# Patient Record
Sex: FEMALE | Race: BLACK OR AFRICAN AMERICAN | Employment: OTHER | URBAN - METROPOLITAN AREA
[De-identification: names, ages, dates, MRNs, and addresses within clinical notes are randomized per-mention and may not be internally consistent; named-entity substitution may affect disease eponyms.]

---

## 2017-02-26 PROBLEM — J18.9 CAP (COMMUNITY ACQUIRED PNEUMONIA): Status: ACTIVE | Noted: 2017-02-26

## 2017-02-26 PROBLEM — E87.6 HYPOKALEMIA: Status: ACTIVE | Noted: 2017-02-26

## 2017-03-24 ENCOUNTER — HOSPITAL ENCOUNTER (OUTPATIENT)
Dept: OTHER | Age: 67
Discharge: HOME | End: 2017-03-24
Attending: FAMILY MEDICINE | Admitting: FAMILY MEDICINE

## 2017-03-24 DIAGNOSIS — J18.9 PNEUMONIA DUE TO INFECTIOUS ORGANISM, UNSPECIFIED LATERALITY, UNSPECIFIED PART OF LUNG: ICD-10-CM

## 2017-03-29 ENCOUNTER — OFFICE VISIT (OUTPATIENT)
Dept: PULMONOLOGY | Age: 67
End: 2017-03-29

## 2017-03-29 VITALS
WEIGHT: 266 LBS | DIASTOLIC BLOOD PRESSURE: 82 MMHG | RESPIRATION RATE: 18 BRPM | SYSTOLIC BLOOD PRESSURE: 135 MMHG | HEIGHT: 64 IN | HEART RATE: 69 BPM | OXYGEN SATURATION: 96 % | BODY MASS INDEX: 45.41 KG/M2 | TEMPERATURE: 97 F

## 2017-03-29 DIAGNOSIS — R91.8 PULMONARY NODULES: ICD-10-CM

## 2017-03-29 DIAGNOSIS — Z09 HOSPITAL DISCHARGE FOLLOW-UP: ICD-10-CM

## 2017-03-29 DIAGNOSIS — G47.33 OSA (OBSTRUCTIVE SLEEP APNEA): ICD-10-CM

## 2017-03-29 DIAGNOSIS — R59.0 MEDIASTINAL LYMPHADENOPATHY: ICD-10-CM

## 2017-03-29 DIAGNOSIS — J18.9 CAP (COMMUNITY ACQUIRED PNEUMONIA): Primary | ICD-10-CM

## 2017-03-29 PROCEDURE — 99214 OFFICE O/P EST MOD 30 MIN: CPT | Performed by: INTERNAL MEDICINE

## 2017-03-29 RX ORDER — OXYCODONE HYDROCHLORIDE AND ACETAMINOPHEN 5; 325 MG/1; MG/1
TABLET ORAL PRN
Status: ON HOLD | COMMUNITY
Start: 2017-02-20 | End: 2019-05-20 | Stop reason: HOSPADM

## 2017-03-29 RX ORDER — LEVOFLOXACIN 500 MG/1
TABLET, FILM COATED ORAL
COMMUNITY
Start: 2017-03-03 | End: 2017-05-05

## 2017-04-17 ENCOUNTER — HOSPITAL ENCOUNTER (OUTPATIENT)
Dept: CT IMAGING | Age: 67
Discharge: HOME | End: 2017-04-17
Attending: INTERNAL MEDICINE | Admitting: INTERNAL MEDICINE

## 2017-04-17 VITALS — OXYGEN SATURATION: 97 % | HEART RATE: 63 BPM

## 2017-04-17 DIAGNOSIS — R91.1 SOLITARY PULMONARY NODULE: ICD-10-CM

## 2017-04-17 DIAGNOSIS — R91.8 PULMONARY NODULES: ICD-10-CM

## 2017-04-17 DIAGNOSIS — R59.0 MEDIASTINAL LYMPHADENOPATHY: ICD-10-CM

## 2017-05-01 ENCOUNTER — HOSPITAL ENCOUNTER (OUTPATIENT)
Dept: PHYSICAL THERAPY | Age: 67
Discharge: HOME | End: 2017-05-31
Attending: FAMILY MEDICINE | Admitting: FAMILY MEDICINE

## 2017-05-01 ASSESSMENT — PAIN DESCRIPTION - FREQUENCY: FREQUENCY: CONTINUOUS

## 2017-05-01 ASSESSMENT — PAIN SCALES - GENERAL: PAINLEVEL_OUTOF10: 6

## 2017-05-01 ASSESSMENT — PAIN DESCRIPTION - ONSET: ONSET: AWAKENED FROM SLEEP

## 2017-05-01 ASSESSMENT — PAIN DESCRIPTION - ORIENTATION: ORIENTATION: LOWER

## 2017-05-01 ASSESSMENT — PAIN DESCRIPTION - PROGRESSION: CLINICAL_PROGRESSION: GRADUALLY WORSENING

## 2017-05-01 ASSESSMENT — PAIN DESCRIPTION - PAIN TYPE: TYPE: CHRONIC PAIN

## 2017-05-01 ASSESSMENT — PAIN DESCRIPTION - LOCATION: LOCATION: BACK

## 2017-05-05 ENCOUNTER — OFFICE VISIT (OUTPATIENT)
Dept: PULMONOLOGY | Age: 67
End: 2017-05-05

## 2017-05-05 VITALS
SYSTOLIC BLOOD PRESSURE: 152 MMHG | TEMPERATURE: 98.6 F | HEART RATE: 86 BPM | OXYGEN SATURATION: 97 % | RESPIRATION RATE: 18 BRPM | DIASTOLIC BLOOD PRESSURE: 89 MMHG | BODY MASS INDEX: 45.07 KG/M2 | WEIGHT: 264 LBS | HEIGHT: 64 IN

## 2017-05-05 DIAGNOSIS — R59.0 MEDIASTINAL LYMPHADENOPATHY: ICD-10-CM

## 2017-05-05 DIAGNOSIS — R91.8 PULMONARY NODULES: Primary | ICD-10-CM

## 2017-05-05 PROCEDURE — 99214 OFFICE O/P EST MOD 30 MIN: CPT | Performed by: INTERNAL MEDICINE

## 2017-05-10 ENCOUNTER — HOSPITAL ENCOUNTER (OUTPATIENT)
Dept: OTHER | Age: 67
Discharge: HOME | End: 2017-05-10
Attending: INTERNAL MEDICINE | Admitting: INTERNAL MEDICINE

## 2017-05-10 DIAGNOSIS — R91.8 LUNG NODULES: ICD-10-CM

## 2017-05-10 RX ORDER — FLUDEOXYGLUCOSE F 18 200 MCI/ML
14.35 INJECTION, SOLUTION INTRAVENOUS
Status: COMPLETED | OUTPATIENT
Start: 2017-05-10 | End: 2017-05-10

## 2017-05-10 RX ADMIN — FLUDEOXYGLUCOSE F 18 14.35 MILLICURIE: 200 INJECTION, SOLUTION INTRAVENOUS at 13:05

## 2017-05-15 ENCOUNTER — TELEPHONE (OUTPATIENT)
Dept: PULMONOLOGY | Age: 67
End: 2017-05-15

## 2017-05-15 DIAGNOSIS — R91.8 PULMONARY NODULES: Primary | ICD-10-CM

## 2017-05-31 ENCOUNTER — OFFICE VISIT (OUTPATIENT)
Dept: SURGERY | Age: 67
End: 2017-05-31

## 2017-05-31 VITALS
WEIGHT: 265 LBS | HEIGHT: 64 IN | BODY MASS INDEX: 45.24 KG/M2 | SYSTOLIC BLOOD PRESSURE: 122 MMHG | DIASTOLIC BLOOD PRESSURE: 82 MMHG

## 2017-05-31 DIAGNOSIS — R59.9 ENLARGED LYMPH NODES: Primary | ICD-10-CM

## 2017-05-31 PROCEDURE — 99203 OFFICE O/P NEW LOW 30 MIN: CPT | Performed by: SURGERY

## 2017-05-31 ASSESSMENT — ENCOUNTER SYMPTOMS
GASTROINTESTINAL NEGATIVE: 1
ALLERGIC/IMMUNOLOGIC NEGATIVE: 1
RESPIRATORY NEGATIVE: 1
BACK PAIN: 1
EYES NEGATIVE: 1

## 2017-06-05 ENCOUNTER — OFFICE VISIT (OUTPATIENT)
Dept: PULMONOLOGY | Age: 67
End: 2017-06-05

## 2017-06-05 ENCOUNTER — HOSPITAL ENCOUNTER (OUTPATIENT)
Dept: OTHER | Age: 67
Discharge: HOME | End: 2017-06-05
Attending: INTERNAL MEDICINE | Admitting: INTERNAL MEDICINE

## 2017-06-05 VITALS
BODY MASS INDEX: 45.07 KG/M2 | HEIGHT: 64 IN | WEIGHT: 264 LBS | DIASTOLIC BLOOD PRESSURE: 84 MMHG | SYSTOLIC BLOOD PRESSURE: 136 MMHG | RESPIRATION RATE: 18 BRPM | HEART RATE: 85 BPM | OXYGEN SATURATION: 96 %

## 2017-06-05 DIAGNOSIS — R91.8 PULMONARY NODULES: Primary | ICD-10-CM

## 2017-06-05 DIAGNOSIS — R59.1 LYMPHADENOPATHY: ICD-10-CM

## 2017-06-05 LAB — SEDIMENTATION RATE, ERYTHROCYTE: 48 MM/HR (ref 0–30)

## 2017-06-05 PROCEDURE — 99214 OFFICE O/P EST MOD 30 MIN: CPT | Performed by: INTERNAL MEDICINE

## 2017-06-06 LAB
ANA INTERPRETATION: ABNORMAL
ANA TITER: ABNORMAL
ANTI-NUCLEAR ANTIBODY (ANA): POSITIVE

## 2017-06-07 LAB
ANCA IFA: NORMAL
DOUBLE STRANDED DNA AB, IGG: NORMAL
ENA TO RNP ANTIBODY: NEGATIVE EU
ENA TO SMITH (SM) ANTIBODY: NEGATIVE EU
ENA TO SSA (RO) ANTIBODY: NEGATIVE EU
ENA TO SSB (LA) ANTIBODY: NEGATIVE EU
SCLERODERMA (SCL-70) AB: 1 AU/ML (ref 0–40)

## 2017-06-15 ENCOUNTER — TELEPHONE (OUTPATIENT)
Dept: PULMONOLOGY | Age: 67
End: 2017-06-15

## 2017-08-15 ENCOUNTER — HOSPITAL ENCOUNTER (OUTPATIENT)
Dept: ENDOSCOPY | Age: 67
Discharge: HOME | End: 2017-08-15
Attending: INTERNAL MEDICINE | Admitting: INTERNAL MEDICINE

## 2017-08-15 LAB
ANION GAP SERPL CALCULATED.3IONS-SCNC: 12 MMOL/L (ref 3–16)
BASOPHILS ABSOLUTE: 0.1 K/UL (ref 0–0.2)
BASOPHILS RELATIVE PERCENT: 0.9 %
BUN BLDV-MCNC: 15 MG/DL (ref 7–20)
CALCIUM SERPL-MCNC: 9.3 MG/DL (ref 8.3–10.6)
CHLORIDE BLD-SCNC: 100 MMOL/L (ref 99–110)
CO2: 26 MMOL/L (ref 21–32)
CREAT SERPL-MCNC: 0.7 MG/DL (ref 0.6–1.2)
EOSINOPHILS ABSOLUTE: 0.2 K/UL (ref 0–0.6)
EOSINOPHILS RELATIVE PERCENT: 2.3 %
GFR AFRICAN AMERICAN: >60
GFR NON-AFRICAN AMERICAN: >60
GLUCOSE BLD-MCNC: 110 MG/DL (ref 70–99)
HCT VFR BLD CALC: 35.3 % (ref 36–48)
HEMOGLOBIN: 11.2 G/DL (ref 12–16)
LYMPHOCYTES ABSOLUTE: 3.6 K/UL (ref 1–5.1)
LYMPHOCYTES RELATIVE PERCENT: 38.7 %
MAGNESIUM: 1.9 MG/DL (ref 1.8–2.4)
MCH RBC QN AUTO: 23.2 PG (ref 26–34)
MCHC RBC AUTO-ENTMCNC: 31.6 G/DL (ref 31–36)
MCV RBC AUTO: 73.2 FL (ref 80–100)
MONOCYTES ABSOLUTE: 0.5 K/UL (ref 0–1.3)
MONOCYTES RELATIVE PERCENT: 5.7 %
NEUTROPHILS ABSOLUTE: 4.9 K/UL (ref 1.7–7.7)
NEUTROPHILS RELATIVE PERCENT: 52.4 %
PDW BLD-RTO: 16.7 % (ref 12.4–15.4)
PLATELET # BLD: 232 K/UL (ref 135–450)
PMV BLD AUTO: 8.3 FL (ref 5–10.5)
POTASSIUM SERPL-SCNC: 3.3 MMOL/L (ref 3.5–5.1)
RBC # BLD: 4.82 M/UL (ref 4–5.2)
SODIUM BLD-SCNC: 138 MMOL/L (ref 136–145)
WBC # BLD: 9.4 K/UL (ref 4–11)

## 2017-10-06 ENCOUNTER — OFFICE VISIT (OUTPATIENT)
Dept: ORTHOPEDIC SURGERY | Age: 67
End: 2017-10-06

## 2017-10-06 VITALS
SYSTOLIC BLOOD PRESSURE: 132 MMHG | DIASTOLIC BLOOD PRESSURE: 87 MMHG | BODY MASS INDEX: 45.07 KG/M2 | HEART RATE: 67 BPM | WEIGHT: 264 LBS | HEIGHT: 64 IN

## 2017-10-06 DIAGNOSIS — M79.7 FIBROMYALGIA: Primary | ICD-10-CM

## 2017-10-06 PROCEDURE — 99203 OFFICE O/P NEW LOW 30 MIN: CPT | Performed by: NURSE PRACTITIONER

## 2017-10-06 NOTE — MR AVS SNAPSHOT
After Visit Summary             Cristal Oppenheim   10/6/2017 9:00 AM   Office Visit    Description:  Female : 1950   Provider:  Tod Madsen CNP   Department:  [facility]              Your Follow-Up and Future Appointments         Below is a list of your follow-up and future appointments. This may not be a complete list as you may have made appointments directly with providers that we are not aware of or your providers may have made some for you. Please call your providers to confirm appointments. It is important to keep your appointments. Please bring your current insurance card, photo ID, co-pay, and all medication bottles to your appointment. If self-pay, payment is expected at the time of service. Your To-Do List     Future Appointments Provider Department Dept Phone    2017 9:15 AM Sisi Lopez MD Pulmonary, Critical Care & Sleep [phone]    It is important to keep your appointment. Please bring a list of your medications to your appointment. Medications will not be filled prior to seeing the physician. Please bring your current insurance card, photo ID, and co-pay. If self-pay, payment is  expected at time of service. Information from Your Visit        Department     Name Address Phone Fax    [address] [facility] Sp[facility] [facility]) [address]. [phone]      You Were Seen for:         Comments    Fibromyalgia   [[id number]]         Vital Signs     Blood Pressure Pulse Height Weight Body Mass Index Smoking Status    132/87 67 5' 4\" (1.626 m) 264 lb (119.7 kg) 45.32 kg/m2 Never Smoker      Additional Information about your Body Mass Index (BMI)           Your BMI as listed above is considered obese (30 or more). BMI is an estimate of body fat, calculated from your height and weight.   The higher your BMI, the greater your risk of heart disease, high blood pressure, type 2 diabetes, stroke, gallstones, arthritis, sleep apnea, and certain cancers. BMI is not perfect. It may overestimate body fat in athletes and people who are more muscular. Even a small weight loss (between 5 and 10 percent of your current weight) by decreasing your calorie intake and becoming more physically active will help lower your risk of developing or worsening diseases associated with obesity. Learn more at: [employer].             Medications and Orders      Your Current Medications Are              traMADol (ULTRAM) 50 MG tablet Take 50 mg by mouth every 6 hours as needed for Pain    potassium chloride (KLOR-CON M) 20 MEQ extended release tablet Take 20 mEq by mouth daily    metoprolol (LOPRESSOR) 100 MG tablet Take 100 mg by mouth daily    gabapentin (NEURONTIN) 600 MG tablet Take 600 mg by mouth 3 times daily     oxyCODONE-acetaminophen (PERCOCET) 5-325 MG per tablet . levothyroxine (SYNTHROID) 150 MCG tablet Take 150 mcg by mouth Daily. valsartan-hydrochlorothiazide (DIOVAN-HCT) 320-25 MG per tablet Take 1 tablet by mouth daily. furosemide (LASIX) 80 MG tablet Take 80 mg by mouth daily. amLODIPine (NORVASC) 10 MG tablet Take 10 mg by mouth daily. Allergies              Lisinopril Other (See Comments)    Cough      We Ordered/Performed the following           Amb External Referral To Pain Clinic     Scheduling Instructions:    Please schedule for evaluation and treatment    Comments: The patient can be scheduled with any member of the group, including the provider with the first available appointments.          Additional Information        Basic Information     Date Of Birth Sex Race Ethnicity Preferred Language    1950 Female Black Non-/Non  English      Problem List as of 10/6/2017  Date Reviewed: 6/5/2017                Enlarged lymph nodes Pulmonary nodules    Mediastinal lymphadenopathy    MARK (obstructive sleep apnea)    CAP (community acquired pneumonia)    Hypokalemia      Preventive Care        Date Due    Hepatitis C screening is recommended for all adults regardless of risk factors born between [facility] at least once (lifetime) who have never been tested. 1950    Tetanus Combination Vaccine (1 - Tdap) 5/16/1970    Cholesterol Screening 5/16/1991    Diabetes Screening 5/16/1991    Colonoscopy 5/16/2001    Zoster Vaccine 5/16/2011    Osteoporosis screening or a bone density scan (Dexa) is recommended once at age 72. Based upon the results and risk factors for bone loss, your provider will recommend whether this needs to be repeated. 5/16/2016    Pneumococcal Vaccines (two) for all adults aged 72 and over (1 of 2 - PCV13) 5/16/2016    Yearly Flu Vaccine (1) 9/1/2017    Mammograms are recommended every 2 years for low/average risk patients aged 48 - 69, and every year for high risk patients per updated national guidelines. However these guidelines can be individualized by your provider. 9/28/2017            Crescentrating Signup           C[employer]centrating allows you to send messages to your doctor, view your test results, renew your prescriptions, schedule appointments, view visit notes, and more. How Do I Sign Up? 1. In your Internet browser, go to [email or web address]  2. Click on the Sign Up Now link in the Sign In box. You will see the New Member Sign Up page. 3. Enter your Crescentrating Access Code exactly as it appears below. You will not need to use this code after youve completed the sign-up process. If you do not sign up before the expiration date, you must request a new code. [employer]rating Access Code: [email or web address]  Expires: 12/5/2017  9:38 AM    4. Enter your Social Security Number ([id number]) and Date of Birth (mm/dd/yyyy) as indicated and click Submit. You will be taken to the next sign-up page. 5. Create a Ruckus ID. This will be your Ruckus login ID and cannot be changed, so think of one that is secure and easy to remember. 6. Create a Ruckus password. You can change your password at any time. 7. Enter your Password Reset Question and Answer. This can be used at a later time if you forget your password. 8. Enter your e-mail address. You will receive e-mail notification when new information is available in [address] 9. Click Sign Up. You can now view your medical record. Additional Information  If you have questions, please contact the physician practice where you receive care. Remember, Ruckus is NOT to be used for urgent needs. For medical emergencies, dial 911. For questions regarding your Ruckus account call [phone]. If you have a clinical question, please call your doctor's office.

## 2017-10-11 ENCOUNTER — HOSPITAL ENCOUNTER (OUTPATIENT)
Dept: ENDOSCOPY | Age: 67
Discharge: HOME | End: 2017-10-11
Attending: INTERNAL MEDICINE | Admitting: INTERNAL MEDICINE

## 2017-10-11 RX ORDER — SODIUM CHLORIDE 9 MG/ML
INJECTION, SOLUTION INTRAVENOUS CONTINUOUS
Status: DISCONTINUED | OUTPATIENT
Start: 2017-10-11 | End: 2017-10-12 | Stop reason: HOSPADM

## 2017-10-11 RX ORDER — SODIUM CHLORIDE 0.9 % (FLUSH) 0.9 %
10 SYRINGE (ML) INJECTION PRN
Status: DISCONTINUED | OUTPATIENT
Start: 2017-10-11 | End: 2017-10-12 | Stop reason: HOSPADM

## 2017-10-11 RX ORDER — SODIUM CHLORIDE 0.9 % (FLUSH) 0.9 %
10 SYRINGE (ML) INJECTION EVERY 12 HOURS SCHEDULED
Status: DISCONTINUED | OUTPATIENT
Start: 2017-10-11 | End: 2017-10-12 | Stop reason: HOSPADM

## 2017-10-11 NOTE — ANESTHESIA PRE-OP
Department of Anesthesiology  Preprocedure Note       Name:  Samuel Blue   Age:  78 y.o.  :  1950                                          MRN:  [id number]         Date:  10/11/2017      Surgeon:    Procedure:    Medications prior to admission:   Prior to Admission medications    Medication Sig Start Date End Date Taking? Authorizing Provider   traMADol (ULTRAM) 50 MG tablet Take 50 mg by mouth every 6 hours as needed for Pain    Historical Provider, MD   potassium chloride (KLOR-CON M) 20 MEQ extended release tablet Take 20 mEq by mouth daily    Historical Provider, MD   metoprolol (LOPRESSOR) 100 MG tablet Take 100 mg by mouth daily    Historical Provider, MD   gabapentin (NEURONTIN) 600 MG tablet Take 600 mg by mouth 3 times daily     Historical Provider, MD   oxyCODONE-acetaminophen (PERCOCET) 5-325 MG per tablet . 17   Historical Provider, MD   levothyroxine (SYNTHROID) 150 MCG tablet Take 150 mcg by mouth Daily. Historical Provider, MD   valsartan-hydrochlorothiazide (DIOVAN-HCT) 320-25 MG per tablet Take 1 tablet by mouth daily. Historical Provider, MD   furosemide (LASIX) 80 MG tablet Take 80 mg by mouth daily. Historical Provider, MD   amLODIPine (NORVASC) 10 MG tablet Take 10 mg by mouth daily. Historical Provider, MD       Current medications:    Current Outpatient Prescriptions   Medication Sig Dispense Refill    traMADol (ULTRAM) 50 MG tablet Take 50 mg by mouth every 6 hours as needed for Pain      potassium chloride (KLOR-CON M) 20 MEQ extended release tablet Take 20 mEq by mouth daily      metoprolol (LOPRESSOR) 100 MG tablet Take 100 mg by mouth daily      gabapentin (NEURONTIN) 600 MG tablet Take 600 mg by mouth 3 times daily       oxyCODONE-acetaminophen (PERCOCET) 5-325 MG per tablet .  levothyroxine (SYNTHROID) 150 MCG tablet Take 150 mcg by mouth Daily.  valsartan-hydrochlorothiazide (DIOVAN-HCT) 320-25 MG per tablet Take 1 tablet by mouth daily.  furosemide (LASIX) 80 MG tablet Take 80 mg by mouth daily.  amLODIPine (NORVASC) 10 MG tablet Take 10 mg by mouth daily. Current Facility-Administered Medications   Medication Dose Route Frequency Provider Last Rate Last Dose    0.9 % sodium chloride infusion   Intravenous Continuous Kimberley Membreno MD        sodium chloride flush 0.9 % injection 10 mL  10 mL Intravenous 2 times per day Kimberley Membreno MD        sodium chloride flush 0.9 % injection 10 mL  10 mL Intravenous PRN Kimberley Membreno MD           Allergies: Allergies   Allergen Reactions    Lisinopril Other (See Comments)     Cough         Problem List:    Patient Active Problem List   Diagnosis Code    CAP (community acquired pneumonia) J18.9    Hypokalemia E87.6    Pulmonary nodules R91.8    Mediastinal lymphadenopathy R59.0    MARK (obstructive sleep apnea) G47.33    Enlarged lymph nodes R59.9       Past Medical History:        Diagnosis Date    Back pain     Fibromyalgia     Hiatal hernia     Hypertension     Peripheral neuropathy ([facility] [address]     Thyroid disease        Past Surgical History:        Procedure Laterality Date    CARPAL TUNNEL RELEASE      ENDOSCOPY, COLON, DIAGNOSTIC      HYSTERECTOMY      THYROID SURGERY         Social History:    Social History   Substance Use Topics    Smoking status: Never Smoker    Smokeless tobacco: Never Used    Alcohol use No                                Counseling given: Not Answered      Vital Signs (Current): There were no vitals filed for this visit. BP Readings from Last 3 Encounters:   10/06/17 132/87   07/30/17 120/83   06/05/17 136/84       NPO Status:                                                                                 BMI:   Wt Readings from Last 3 Encounters:   10/06/17 264 lb (119.7 kg)   09/20/17 264 lb (119.7 kg)   08/02/17 263 lb (119.3 kg)     There is no height or weight on file to calculate BMI.     Anesthesia Evaluation  Patient summary reviewed and Nursing notes reviewed  Airway: Mallampati: II     Neck ROM: limited  Mouth opening: > = 3 FB Dental: normal exam         Pulmonary:       (-) pneumonia and sleep apnea                           Cardiovascular:  Exercise tolerance: good (>4 METS),   (+) hypertension:,                   Neuro/Psych:      (-) neuromuscular disease           GI/Hepatic/Renal:   (+) hiatal hernia,           Endo/Other:    (+) hypothyroidism::.                 Abdominal:           Vascular:                                    Anesthesia Plan      MAC     ASA 2             Anesthetic plan and risks discussed with patient. Plan discussed with CRNA.                   Markel Guillen MD   10/11/2017

## 2017-10-11 NOTE — ANESTHESIA POST-OP
Anesthesia Post-op Note    Patient: Samuel Blue  MRN: [id number]  YOB: 1950  Date of evaluation: 10/11/2017  Time:  12:35 PM     Procedure(s) Performed:     Last Vitals: There were no vitals taken for this visit.     Nikki Phase I:      Nikki Phase II:      Anesthesia Post Evaluation    Final anesthesia type: MAC and TIVA  Patient location during evaluation: PACU  Level of consciousness: awake  Complications: no  Hydration status: euvolemic        Almaz Alegre MD  12:35 PM

## 2018-08-13 ENCOUNTER — HOSPITAL ENCOUNTER (OUTPATIENT)
Dept: MAMMOGRAPHY | Age: 68
Discharge: HOME | End: 2018-08-13
Attending: FAMILY MEDICINE | Admitting: FAMILY MEDICINE

## 2018-08-13 DIAGNOSIS — Z12.31 VISIT FOR SCREENING MAMMOGRAM: ICD-10-CM

## 2018-12-20 ENCOUNTER — HOSPITAL ENCOUNTER (OUTPATIENT)
Dept: PHYSICAL THERAPY | Age: 68
Setting detail: THERAPIES SERIES
Discharge: HOME | End: 2018-12-20
Payer: MEDICARE

## 2018-12-20 PROCEDURE — G8979 MOBILITY GOAL STATUS: HCPCS

## 2018-12-20 PROCEDURE — 97110 THERAPEUTIC EXERCISES: CPT

## 2018-12-20 PROCEDURE — 97162 PT EVAL MOD COMPLEX 30 MIN: CPT

## 2018-12-20 PROCEDURE — 97530 THERAPEUTIC ACTIVITIES: CPT

## 2018-12-20 PROCEDURE — G8978 MOBILITY CURRENT STATUS: HCPCS

## 2018-12-20 ASSESSMENT — PAIN DESCRIPTION - ORIENTATION: ORIENTATION: RIGHT;LEFT;LOWER;MID

## 2018-12-20 ASSESSMENT — PAIN DESCRIPTION - DESCRIPTORS: DESCRIPTORS: BURNING;SHARP;SHOOTING;PINS AND NEEDLES

## 2018-12-20 ASSESSMENT — PAIN DESCRIPTION - PAIN TYPE: TYPE: CHRONIC PAIN

## 2018-12-20 ASSESSMENT — PAIN SCALES - GENERAL: PAINLEVEL_OUTOF10: 10

## 2018-12-20 ASSESSMENT — ACTIVITIES OF DAILY LIVING (ADL): EFFECT OF PAIN ON DAILY ACTIVITIES: ALL ACTIVITIES

## 2018-12-20 ASSESSMENT — PAIN DESCRIPTION - ONSET: ONSET: AWAKENED FROM SLEEP

## 2018-12-20 NOTE — FLOWSHEET NOTE
Physical Therapy Daily/Aquatic Flow Sheet   Date:  2018    Patient Name:  Reji Hernández    :  1950  MRN: [id number]  Restrictions/Precautions:    Medical/Treatment Diagnosis Information:   · Diagnosis: Back Pain M54.9   · Treatment Diagnosis: Decreased general mobility, chronic widespread pain, diff with gait, impaired ADL status     Tracking Information:  Physician Information Referring Practitioner: [address] [place] [address] Sent Date:  18  Signed Received:    Visit Count / Total Visits       Insurance Approved Visits  /  Approved Dates:     Insurance Information PT Insurance Information: [employer] 40 visits/year      Progress Note/G-codes   []  Yes  [x]  No Next Due: 10th visit (land)     Pain level: 1010 ALL OVER     Subjective:  SEE EVAL     Objective:   Observation: SEE EVAL   Test measurements:    Land-based Therapy Dates of Service:    Land-based Visits Exercises/Activities:  Exercise/Equipment Resistance/Repetitions Other comments                                                                              AquaticTherapy Dates of Service:   Aquatic Visits Exercises/Activities:   Transfers:          % Immersion:            Ambulation:   UE Exercises:       Forwards   X Shoulder Shrugs  X    Lateral   X Shoulder Circles  X    Retro   X Scapular Retraction   X    Marching   Push Downs       [place]   Punching     Jog    Rowing     Multifidi walkouts with paddle   Elbow Flex/Ext X      Shldr Flex/Ext X      Shldr aBd/aDd X   LE Exercises:  Shldr Horiz aBd/aDd X   HR/TR X Shldr IR/ER    Marches X Arm Circles X   Squats  X PNF Diagonals    Hamstring Curls X Wall Push Ups    Hip Flexion (SLR) X Figure 8's    Hip aBduction (SLR) X Bilateral Pull Downs    Hip Extension (SLR) X      Hip aDduction (SLR)      Hip Circles  Functional:    Hip IR/Er  Step up forward X   TrA Set   Step up lateral  X   Pelvic Tilts   Step down     Fig 8's   Lunges Forward    LE PNF  Lunges Retro Prognosis: [x] Good [x] Fair  [] Poor    Patient Requires Follow-up: [x] Yes  [] No    Plan:   [] Continue per plan of care [] Alter current plan (see comments)  [x] Plan of care initiated [] Hold pending MD visit [] Discharge  Plan for Next Session:   Aquatic therapy.       Electronically signed by:  Linda Candelaria PT

## 2018-12-26 ENCOUNTER — HOSPITAL ENCOUNTER (EMERGENCY)
Age: 68
Discharge: HOME | End: 2018-12-26
Attending: EMERGENCY MEDICINE
Payer: MEDICARE

## 2018-12-26 ENCOUNTER — APPOINTMENT (OUTPATIENT)
Dept: GENERAL RADIOLOGY | Age: 68
End: 2018-12-26
Payer: MEDICARE

## 2018-12-26 VITALS
DIASTOLIC BLOOD PRESSURE: 99 MMHG | RESPIRATION RATE: 16 BRPM | BODY MASS INDEX: 46.1 KG/M2 | OXYGEN SATURATION: 100 % | SYSTOLIC BLOOD PRESSURE: 174 MMHG | HEIGHT: 64 IN | HEART RATE: 56 BPM | WEIGHT: 270 LBS | TEMPERATURE: 98.1 F

## 2018-12-26 DIAGNOSIS — M25.512 ACUTE PAIN OF LEFT SHOULDER: Primary | ICD-10-CM

## 2018-12-26 DIAGNOSIS — M19.012 OSTEOARTHRITIS OF LEFT SHOULDER, UNSPECIFIED OSTEOARTHRITIS TYPE: ICD-10-CM

## 2018-12-26 PROCEDURE — 93005 ELECTROCARDIOGRAM TRACING: CPT | Performed by: EMERGENCY MEDICINE

## 2018-12-26 PROCEDURE — 73030 X-RAY EXAM OF SHOULDER: CPT

## 2018-12-26 PROCEDURE — 99283 EMERGENCY DEPT VISIT LOW MDM: CPT

## 2018-12-26 RX ORDER — KETOROLAC TROMETHAMINE 30 MG/ML
30 INJECTION, SOLUTION INTRAMUSCULAR; INTRAVENOUS ONCE
Status: DISCONTINUED | OUTPATIENT
Start: 2018-12-26 | End: 2018-12-26 | Stop reason: HOSPADM

## 2018-12-26 RX ORDER — MELOXICAM 15 MG/1
15 TABLET ORAL DAILY
Qty: 30 TABLET | Refills: 0 | Status: SHIPPED | OUTPATIENT
Start: 2018-12-26 | End: 2019-05-09

## 2018-12-26 ASSESSMENT — PAIN DESCRIPTION - ORIENTATION: ORIENTATION: LEFT

## 2018-12-26 ASSESSMENT — PAIN SCALES - GENERAL: PAINLEVEL_OUTOF10: 8

## 2018-12-26 ASSESSMENT — ENCOUNTER SYMPTOMS
COUGH: 0
VOMITING: 0
COLOR CHANGE: 0
BACK PAIN: 0
RESPIRATORY NEGATIVE: 1
NAUSEA: 0
DIARRHEA: 0
CONSTIPATION: 0
SHORTNESS OF BREATH: 0
ABDOMINAL PAIN: 0

## 2018-12-26 ASSESSMENT — PAIN DESCRIPTION - LOCATION: LOCATION: ARM

## 2018-12-26 NOTE — ED PROVIDER NOTES
the HPI. REVIEW OF SYSTEMS    (2-9 systems for level 4, 10 or more for level 5)     Review of Systems   Constitutional: Negative for activity change, appetite change, chills and fever. Respiratory: Negative. Negative for cough and shortness of breath. Cardiovascular: Negative. Negative for chest pain. Gastrointestinal: Negative for abdominal pain, constipation, diarrhea, nausea and vomiting. Genitourinary: Negative for difficulty urinating and dysuria. Musculoskeletal: Positive for arthralgias and myalgias. Negative for back pain, gait problem, joint swelling, neck pain and neck stiffness. Skin: Negative for color change, pallor, rash and wound. Neurological: Negative for dizziness, weakness, light-headedness, numbness and headaches. Positives and Pertinent negatives as per HPI. Except as noted abovein the ROS, all other systems were reviewed and negative. PAST MEDICAL HISTORY     Past Medical History:   Diagnosis Date    Back pain     Fibromyalgia     Hiatal hernia     Hypertension     Peripheral neuropathy     Thyroid disease          SURGICAL HISTORY     Past Surgical History:   Procedure Laterality Date    CARPAL TUNNEL RELEASE      ENDOSCOPY, COLON, DIAGNOSTIC      HYSTERECTOMY      THYROID SURGERY           CURRENTMEDICATIONS       Previous Medications    AMLODIPINE (NORVASC) 10 MG TABLET    Take 10 mg by mouth daily. FUROSEMIDE (LASIX) 80 MG TABLET    Take 80 mg by mouth daily. GABAPENTIN (NEURONTIN) 600 MG TABLET    Take 600 mg by mouth 3 times daily     LEVOTHYROXINE (SYNTHROID) 150 MCG TABLET    Take 150 mcg by mouth Daily. METOPROLOL (LOPRESSOR) 100 MG TABLET    Take 100 mg by mouth daily    OXYCODONE-ACETAMINOPHEN (PERCOCET) 5-325 MG PER TABLET    .     POTASSIUM CHLORIDE (KLOR-CON M) 20 MEQ EXTENDED RELEASE TABLET    Take 20 mEq by mouth daily    TRAMADOL (ULTRAM) 50 MG TABLET    Take 50 mg by mouth every 6 hours as needed for Pain         ALLERGIES Negative speeds. Negative [place]. Does have positive Neer. Negative empty can. Neurological: She is alert and oriented to person, place, and time. Skin: Skin is warm and dry. She is not diaphoretic. No erythema. No pallor. Psychiatric: She has a normal mood and affect. Her behavior is normal.       DIAGNOSTIC RESULTS   LABS:    Labs Reviewed - No data to display    All other labs were within normal range or not returned as of this dictation. EKG: All EKG's are interpreted by the Emergency Department Physician who either signs orCo-signs this chart in the absence of a cardiologist.  Please see their note for interpretation of EKG. RADIOLOGY:   Non-plain film images such as CT, Ultrasound and MRI are read by the radiologist. Plain radiographic images are visualized andpreliminarily interpreted by the  ED Provider with the below findings:        Interpretation [facility] Radiologist below, if available at the time of this note:    XR SHOULDER LEFT (MIN 2 VIEWS)   Preliminary Result   1. No acute abnormality of the left shoulder. 2. Mild osteoarthritis at the [facility] and glenohumeral joints. No results found. PROCEDURES   Unless otherwise noted below, none     Procedures    CRITICAL CARE TIME   N/A    CONSULTS:  None      EMERGENCY DEPARTMENT COURSE and DIFFERENTIALDIAGNOSIS/MDM:   Vitals:    Vitals:    12/26/18 0802   BP: (!) 174/99   Pulse: 56   Resp: 16   Temp: 98.1 °F (36.7 °C)   TempSrc: Infrared   SpO2: 100%   Weight: 270 lb (122.5 kg)   Height: 5' 4\" (1.626 m)       Patient was given thefollowing medications:  Medications   ketorolac (TORADOL) injection 30 mg (not administered)       Patient is a 42-year-old female who presents ED into the left shoulder pain. Upon examination patient afebrile with stable vital signs. Pain with palpation and movement of left shoulder. Pain specifically with internal or external rotation. Has a positive Neer's.   Given history and physical examination

## 2018-12-26 NOTE — ED NOTES
Bed: 09  Expected date:   Expected time:   Means of arrival:   Comments:  Oscar Hoff RN  12/26/18 5917

## 2018-12-27 LAB
EKG ATRIAL RATE: 83 BPM
EKG DIAGNOSIS: NORMAL
EKG P AXIS: 100 DEGREES
EKG P-R INTERVAL: 142 MS
EKG Q-T INTERVAL: 396 MS
EKG QRS DURATION: 82 MS
EKG QTC CALCULATION (BAZETT): 465 MS
EKG R AXIS: -13 DEGREES
EKG T AXIS: 28 DEGREES
EKG VENTRICULAR RATE: 83 BPM

## 2018-12-27 PROCEDURE — 93010 ELECTROCARDIOGRAM REPORT: CPT | Performed by: INTERNAL MEDICINE

## 2018-12-27 NOTE — PROGRESS NOTES
Physical Therapy  Cancellation/No-show Note  Patient Name:  Josselin Varma  :  1950   Date:  2018  Cancelled visits to date: 0  No-shows to date: 0    Patient status for today's appointment patient:  []  Cancelled  []  Rescheduled appointment  [x]  No-show  (pool)   Reason given by patient:  []  Patient ill  []  Conflicting appointment  []  No transportation    []  Conflict with work  []  No reason given  []  Other:     Comments:   Noted that pt was seen in ED yesterday for L shaun pain    Phone call information:   []  Phone call made today to patient at _ time at number provided:      []  Patient answered, conversation as follows:    []  Patient did not answer, message left as follows:  [x]  Phone call not made today    Electronically signed by:  Malgorzata Henry PT

## 2018-12-31 ENCOUNTER — HOSPITAL ENCOUNTER (OUTPATIENT)
Dept: PHYSICAL THERAPY | Age: 68
Setting detail: THERAPIES SERIES
Discharge: HOME | End: 2018-12-31
Payer: MEDICARE

## 2018-12-31 PROCEDURE — 97113 AQUATIC THERAPY/EXERCISES: CPT

## 2018-12-31 PROCEDURE — 97150 GROUP THERAPEUTIC PROCEDURES: CPT

## 2019-01-04 ENCOUNTER — HOSPITAL ENCOUNTER (OUTPATIENT)
Dept: PHYSICAL THERAPY | Age: 69
Setting detail: THERAPIES SERIES
Discharge: HOME | End: 2019-01-04
Payer: MEDICARE

## 2019-01-04 PROCEDURE — 97113 AQUATIC THERAPY/EXERCISES: CPT

## 2019-01-08 ENCOUNTER — HOSPITAL ENCOUNTER (OUTPATIENT)
Dept: PHYSICAL THERAPY | Age: 69
Setting detail: THERAPIES SERIES
Discharge: HOME | End: 2019-01-08
Payer: MEDICARE

## 2019-01-08 PROCEDURE — 97150 GROUP THERAPEUTIC PROCEDURES: CPT

## 2019-01-08 PROCEDURE — 97113 AQUATIC THERAPY/EXERCISES: CPT

## 2019-01-10 ENCOUNTER — HOSPITAL ENCOUNTER (OUTPATIENT)
Dept: PHYSICAL THERAPY | Age: 69
Setting detail: THERAPIES SERIES
Discharge: HOME | End: 2019-01-10
Payer: MEDICARE

## 2019-01-10 PROCEDURE — 97150 GROUP THERAPEUTIC PROCEDURES: CPT

## 2019-01-10 PROCEDURE — 97113 AQUATIC THERAPY/EXERCISES: CPT

## 2019-01-15 ENCOUNTER — HOSPITAL ENCOUNTER (OUTPATIENT)
Dept: PHYSICAL THERAPY | Age: 69
Setting detail: THERAPIES SERIES
Discharge: HOME | End: 2019-01-15
Payer: MEDICARE

## 2019-01-15 PROCEDURE — 97150 GROUP THERAPEUTIC PROCEDURES: CPT

## 2019-01-15 PROCEDURE — 97113 AQUATIC THERAPY/EXERCISES: CPT

## 2019-01-17 ENCOUNTER — HOSPITAL ENCOUNTER (OUTPATIENT)
Dept: PHYSICAL THERAPY | Age: 69
Setting detail: THERAPIES SERIES
Discharge: HOME | End: 2019-01-17
Payer: MEDICARE

## 2019-01-17 PROCEDURE — 97150 GROUP THERAPEUTIC PROCEDURES: CPT

## 2019-01-17 PROCEDURE — 97113 AQUATIC THERAPY/EXERCISES: CPT

## 2019-01-18 ENCOUNTER — APPOINTMENT (OUTPATIENT)
Dept: PHYSICAL THERAPY | Age: 69
End: 2019-01-18
Payer: MEDICARE

## 2019-01-22 ENCOUNTER — HOSPITAL ENCOUNTER (OUTPATIENT)
Dept: PHYSICAL THERAPY | Age: 69
Setting detail: THERAPIES SERIES
Discharge: HOME | End: 2019-01-22
Payer: MEDICARE

## 2019-01-22 PROCEDURE — 97150 GROUP THERAPEUTIC PROCEDURES: CPT

## 2019-01-22 PROCEDURE — 97113 AQUATIC THERAPY/EXERCISES: CPT

## 2019-01-24 ENCOUNTER — HOSPITAL ENCOUNTER (OUTPATIENT)
Dept: PHYSICAL THERAPY | Age: 69
Setting detail: THERAPIES SERIES
Discharge: HOME | End: 2019-01-24
Payer: MEDICARE

## 2019-01-24 PROCEDURE — 97113 AQUATIC THERAPY/EXERCISES: CPT

## 2019-01-24 PROCEDURE — 97150 GROUP THERAPEUTIC PROCEDURES: CPT

## 2019-01-25 ENCOUNTER — APPOINTMENT (OUTPATIENT)
Dept: PHYSICAL THERAPY | Age: 69
End: 2019-01-25
Payer: MEDICARE

## 2019-01-28 ENCOUNTER — HOSPITAL ENCOUNTER (OUTPATIENT)
Dept: PHYSICAL THERAPY | Age: 69
Setting detail: THERAPIES SERIES
Discharge: HOME | End: 2019-01-28
Payer: MEDICARE

## 2019-01-28 PROCEDURE — 97110 THERAPEUTIC EXERCISES: CPT

## 2019-01-28 PROCEDURE — 97530 THERAPEUTIC ACTIVITIES: CPT

## 2019-02-01 ENCOUNTER — HOSPITAL ENCOUNTER (OUTPATIENT)
Dept: PHYSICAL THERAPY | Age: 69
Setting detail: THERAPIES SERIES
Discharge: HOME | End: 2019-02-01
Payer: MEDICARE

## 2019-02-01 PROCEDURE — 97150 GROUP THERAPEUTIC PROCEDURES: CPT

## 2019-02-01 PROCEDURE — 97113 AQUATIC THERAPY/EXERCISES: CPT

## 2019-02-07 ENCOUNTER — HOSPITAL ENCOUNTER (OUTPATIENT)
Dept: PHYSICAL THERAPY | Age: 69
Setting detail: THERAPIES SERIES
Discharge: HOME | End: 2019-02-07
Payer: MEDICARE

## 2019-02-07 PROCEDURE — 97113 AQUATIC THERAPY/EXERCISES: CPT

## 2019-02-07 PROCEDURE — 97150 GROUP THERAPEUTIC PROCEDURES: CPT

## 2019-02-12 ENCOUNTER — HOSPITAL ENCOUNTER (OUTPATIENT)
Dept: PHYSICAL THERAPY | Age: 69
Setting detail: THERAPIES SERIES
Discharge: HOME | End: 2019-02-12
Payer: MEDICARE

## 2019-02-12 PROCEDURE — 97150 GROUP THERAPEUTIC PROCEDURES: CPT

## 2019-02-12 PROCEDURE — 97113 AQUATIC THERAPY/EXERCISES: CPT

## 2019-02-15 ENCOUNTER — HOSPITAL ENCOUNTER (OUTPATIENT)
Dept: PHYSICAL THERAPY | Age: 69
Setting detail: THERAPIES SERIES
Discharge: HOME | End: 2019-02-15
Payer: MEDICARE

## 2019-02-15 PROCEDURE — 97150 GROUP THERAPEUTIC PROCEDURES: CPT

## 2019-02-15 PROCEDURE — 97113 AQUATIC THERAPY/EXERCISES: CPT

## 2019-02-18 ENCOUNTER — HOSPITAL ENCOUNTER (OUTPATIENT)
Dept: PHYSICAL THERAPY | Age: 69
Setting detail: THERAPIES SERIES
Discharge: HOME | End: 2019-02-18
Payer: MEDICARE

## 2019-02-18 PROCEDURE — 97150 GROUP THERAPEUTIC PROCEDURES: CPT

## 2019-02-18 PROCEDURE — 97113 AQUATIC THERAPY/EXERCISES: CPT

## 2019-02-22 ENCOUNTER — HOSPITAL ENCOUNTER (OUTPATIENT)
Dept: PHYSICAL THERAPY | Age: 69
Setting detail: THERAPIES SERIES
Discharge: HOME | End: 2019-02-22
Payer: MEDICARE

## 2019-02-22 PROCEDURE — 97113 AQUATIC THERAPY/EXERCISES: CPT

## 2019-02-22 PROCEDURE — 97150 GROUP THERAPEUTIC PROCEDURES: CPT

## 2019-02-25 ENCOUNTER — HOSPITAL ENCOUNTER (OUTPATIENT)
Dept: PHYSICAL THERAPY | Age: 69
Setting detail: THERAPIES SERIES
Discharge: HOME | End: 2019-02-25
Payer: MEDICARE

## 2019-02-25 PROCEDURE — 97150 GROUP THERAPEUTIC PROCEDURES: CPT

## 2019-02-25 PROCEDURE — 97113 AQUATIC THERAPY/EXERCISES: CPT

## 2019-03-01 ENCOUNTER — HOSPITAL ENCOUNTER (OUTPATIENT)
Dept: PHYSICAL THERAPY | Age: 69
Setting detail: THERAPIES SERIES
Discharge: HOME | End: 2019-03-01
Payer: MEDICARE

## 2019-03-04 ENCOUNTER — HOSPITAL ENCOUNTER (OUTPATIENT)
Dept: PHYSICAL THERAPY | Age: 69
Setting detail: THERAPIES SERIES
Discharge: HOME | End: 2019-03-04
Payer: MEDICARE

## 2019-03-04 ENCOUNTER — HOSPITAL ENCOUNTER (OUTPATIENT)
Age: 69
Discharge: HOME | End: 2019-03-04
Payer: MEDICARE

## 2019-03-04 LAB
BASOPHILS ABSOLUTE: 0.1 K/UL (ref 0–0.2)
BASOPHILS RELATIVE PERCENT: 0.4 %
EOSINOPHILS ABSOLUTE: 0.1 K/UL (ref 0–0.6)
EOSINOPHILS RELATIVE PERCENT: 0.5 %
HCT VFR BLD CALC: 34.2 % (ref 36–48)
HEMOGLOBIN: 10.9 G/DL (ref 12–16)
LYMPHOCYTES ABSOLUTE: 3.4 K/UL (ref 1–5.1)
LYMPHOCYTES RELATIVE PERCENT: 24.2 %
MCH RBC QN AUTO: 22.9 PG (ref 26–34)
MCHC RBC AUTO-ENTMCNC: 31.8 G/DL (ref 31–36)
MCV RBC AUTO: 71.9 FL (ref 80–100)
MONOCYTES ABSOLUTE: 1 K/UL (ref 0–1.3)
MONOCYTES RELATIVE PERCENT: 7.3 %
NEUTROPHILS ABSOLUTE: 9.5 K/UL (ref 1.7–7.7)
NEUTROPHILS RELATIVE PERCENT: 67.6 %
PDW BLD-RTO: 16.1 % (ref 12.4–15.4)
PLATELET # BLD: 315 K/UL (ref 135–450)
PMV BLD AUTO: 8.3 FL (ref 5–10.5)
RBC # BLD: 4.75 M/UL (ref 4–5.2)
SEDIMENTATION RATE, ERYTHROCYTE: 64 MM/HR (ref 0–30)
URIC ACID, SERUM: 6.3 MG/DL (ref 2.6–6)
WBC # BLD: 14 K/UL (ref 4–11)

## 2019-03-04 PROCEDURE — 84550 ASSAY OF BLOOD/URIC ACID: CPT

## 2019-03-04 PROCEDURE — 85025 COMPLETE CBC W/AUTO DIFF WBC: CPT

## 2019-03-04 PROCEDURE — 36415 COLL VENOUS BLD VENIPUNCTURE: CPT

## 2019-03-04 PROCEDURE — 85652 RBC SED RATE AUTOMATED: CPT

## 2019-04-25 ENCOUNTER — APPOINTMENT (OUTPATIENT)
Dept: GENERAL RADIOLOGY | Age: 69
End: 2019-04-25
Payer: MEDICARE

## 2019-04-25 ENCOUNTER — HOSPITAL ENCOUNTER (EMERGENCY)
Age: 69
Discharge: HOME | End: 2019-04-25
Payer: MEDICARE

## 2019-04-25 VITALS
DIASTOLIC BLOOD PRESSURE: 81 MMHG | RESPIRATION RATE: 18 BRPM | SYSTOLIC BLOOD PRESSURE: 156 MMHG | HEIGHT: 64 IN | HEART RATE: 89 BPM | OXYGEN SATURATION: 96 % | WEIGHT: 272 LBS | TEMPERATURE: 98.6 F | BODY MASS INDEX: 46.44 KG/M2

## 2019-04-25 DIAGNOSIS — S93.402A SPRAIN OF LEFT ANKLE, UNSPECIFIED LIGAMENT, INITIAL ENCOUNTER: Primary | ICD-10-CM

## 2019-04-25 PROCEDURE — 73630 X-RAY EXAM OF FOOT: CPT

## 2019-04-25 PROCEDURE — 73600 X-RAY EXAM OF ANKLE: CPT

## 2019-04-25 PROCEDURE — 99283 EMERGENCY DEPT VISIT LOW MDM: CPT

## 2019-04-25 ASSESSMENT — PAIN DESCRIPTION - ORIENTATION: ORIENTATION: LEFT

## 2019-04-25 ASSESSMENT — ENCOUNTER SYMPTOMS
COLOR CHANGE: 0
NAUSEA: 0
BACK PAIN: 0
VOMITING: 0
DIARRHEA: 0
WHEEZING: 0
ABDOMINAL PAIN: 0
SHORTNESS OF BREATH: 0

## 2019-04-25 ASSESSMENT — PAIN DESCRIPTION - PAIN TYPE: TYPE: ACUTE PAIN

## 2019-04-25 ASSESSMENT — PAIN SCALES - GENERAL: PAINLEVEL_OUTOF10: 7

## 2019-04-25 ASSESSMENT — PAIN DESCRIPTION - LOCATION: LOCATION: ANKLE

## 2019-04-25 NOTE — ED PROVIDER NOTES
[address] [facility]  eMERGENCY dEPARTMENT eNCOUnter        Pt Name: Jaci Cruz  MRN: [id number]  [place] 1950  Date of evaluation: 4/25/2019  Provider: Carli Sloan PA-C  PCP: Augustina Castillo MD    This patient was not seen and evaluated by the attending physician No att. providers found. CHIEF COMPLAINT       Chief Complaint   Patient presents with    Ankle Pain     Pt. comes in today with left ankle pain. Pt. states she got out of her truck on Tuesday and slide onto some uneven payment and hurt her left ankle. HISTORY OF PRESENT ILLNESS   (Location/Symptom, Timing/Onset, Context/Setting, Quality, Duration, Modifying Factors, Severity)  Note limiting factors. Jaci Cruz is a 80 y.o. female patient presents emergency department for evaluation of left ankle pain. On Tuesday patient was getting out of her truck when she slid on some uneven pavement and rolled her left ankle. Patient was able to ambulate slightly yesterday but is unable to ambulate due to pain today. SHe states her left ankle feels swollen and warm. Patient states she just had gout in her right foot. Patient has some chronic knee issues as well. Patient currently rates her ankle pain as a 7/10. She denies any numbness or tingling in her leg. Patient denies any back pain, loss of bowel or bladder or saddle anesthesia. She did not fall onto the ground or hit her head. Nursing Notes were all reviewed and agreed with or any disagreements were addressed  in the HPI. REVIEW OF SYSTEMS    (2-9 systems for level 4, 10 or more for level 5)     Review of Systems   Constitutional: Negative for fatigue and fever. HENT: Negative. Eyes: Negative for visual disturbance. Respiratory: Negative for shortness of breath and wheezing. Cardiovascular: Negative for chest pain and palpitations. Gastrointestinal: Negative for abdominal pain, diarrhea, nausea and vomiting. Genitourinary: Negative for dysuria. Musculoskeletal: Positive for arthralgias (left ankle). Negative for back pain, joint swelling, myalgias, neck pain and neck stiffness. Skin: Negative for color change, pallor, rash and wound. Neurological: Negative for dizziness, syncope, light-headedness and headaches. Positives and Pertinent negatives as per HPI. Except as noted abovein the ROS, all other systems were reviewed and negative. PAST MEDICAL HISTORY     Past Medical History:   Diagnosis Date    Arthritis     Back pain     Fibromyalgia     Hiatal hernia     Hypertension     Peripheral neuropathy     Spondylolisthesis, lumbar region     Thyroid disease          SURGICAL HISTORY     Past Surgical History:   Procedure Laterality Date    CARPAL TUNNEL RELEASE      ENDOSCOPY, COLON, DIAGNOSTIC      HYSTERECTOMY      THYROID SURGERY           CURRENTMEDICATIONS       Previous Medications    AMLODIPINE (NORVASC) 10 MG TABLET    Take 10 mg by mouth daily. FUROSEMIDE (LASIX) 80 MG TABLET    Take 80 mg by mouth daily. GABAPENTIN (NEURONTIN) 600 MG TABLET    Take 600 mg by mouth 3 times daily as needed. LEVOTHYROXINE (SYNTHROID) 150 MCG TABLET    Take 150 mcg by mouth Daily. MELOXICAM (MOBIC) 15 MG TABLET    Take 1 tablet by mouth daily    METOPROLOL (LOPRESSOR) 100 MG TABLET    Take 100 mg by mouth daily    OXYCODONE-ACETAMINOPHEN (PERCOCET) 5-325 MG PER TABLET    as needed.      POTASSIUM CHLORIDE (KLOR-CON M) 20 MEQ EXTENDED RELEASE TABLET    Take 20 mEq by mouth daily    TRAMADOL (ULTRAM) 50 MG TABLET    Take 50 mg by mouth every 6 hours as needed for Pain         ALLERGIES     Lisinopril    FAMILYHISTORY       Family History   Problem Relation Age of Onset    Diabetes Mother           SOCIAL HISTORY       Social History     Socioeconomic History    Marital status:      Spouse name: None    Number of children: None    Years of education: None    Highest education level: None   Occupational History    None   Social Needs    Financial resource strain: None    Food insecurity:     Worry: None     Inability: None    Transportation needs:     Medical: None     Non-medical: None   Tobacco Use    Smoking status: Never Smoker    Smokeless tobacco: Never Used   Substance and Sexual Activity    Alcohol use: No    Drug use: No    Sexual activity: None   Lifestyle    Physical activity:     Days per week: None     Minutes per session: None    Stress: None   Relationships    Social connections:     Talks on phone: None     Gets together: None     Attends Latter-day service: None     Active member of club or organization: None     Attends meetings of clubs or organizations: None     Relationship status: None    Intimate partner violence:     Fear of current or ex partner: None     Emotionally abused: None     Physically abused: None     Forced sexual activity: None   Other Topics Concern    None   Social History Narrative    None       SCREENINGS             PHYSICAL EXAM    (up to 7 for level 4, 8 or more for level 5)     ED Triage Vitals [04/25/19 1807]   BP Temp Temp Source Pulse Resp SpO2 Height Weight   (!) 162/93 98.6 °F (37 °C) Infrared 89 18 96 % 5' 4\" (1.626 m) 272 lb (123.4 kg)       Physical Exam   Constitutional: She is oriented to person, place, and time. She appears well-developed and well-nourished. No distress. HENT:   Head: Normocephalic and atraumatic. Nose: Nose normal.   Mouth/Throat: Oropharynx is clear and moist.   Eyes: Conjunctivae and EOM are normal. Right eye exhibits no discharge. Left eye exhibits no discharge. Neck: Normal range of motion. Neck supple. Cardiovascular: Intact distal pulses. Pulmonary/Chest: Effort normal. No respiratory distress. Musculoskeletal:        Right ankle: Normal.        Left ankle: She exhibits decreased range of motion and swelling. She exhibits no deformity, no laceration and normal pulse. No tenderness. Patient has some nonpitting edema and warmth to the bilateral ankle and foot of the left side. Neurological: She is alert and oriented to person, place, and time. Skin: Skin is warm and dry. Capillary refill takes less than 2 seconds. No rash noted. She is not diaphoretic. There is erythema. No pallor. Psychiatric: She has a normal mood and affect. Her behavior is normal.   Nursing note and vitals reviewed. DIAGNOSTIC RESULTS   LABS:    Labs Reviewed - No data to display    All other labs were within normal range or not returned as of this dictation. EKG: All EKG's are interpreted by the Emergency Department Physician who either signs orCo-signs this chart in the absence of a cardiologist.  Please see their note for interpretation of EKG. RADIOLOGY:   Non-plain film images such as CT, Ultrasound and MRI are read by the radiologist. Plain radiographic images are visualized andpreliminarily interpreted by the  ED Provider with the below findings:        Interpretation perthe Radiologist below, if available at the time of this note:    XR FOOT LEFT (MIN 3 VIEWS)   Final Result   Circumferential soft tissue swelling of the forefoot, greater dorsally. No   soft tissue gas or radiopaque foreign body. No radiographic evidence of osteomyelitis is detected at this time. XR ANKLE LEFT (2 VIEWS)   Final Result   No evidence of acute fracture. No results found.       PROCEDURES   Unless otherwise noted below, none     Procedures    CRITICAL CARE TIME   N/A    CONSULTS:  None      EMERGENCY DEPARTMENT COURSE and DIFFERENTIALDIAGNOSIS/MDM:   Vitals:    Vitals:    04/25/19 1807   BP: (!) 162/93   Pulse: 89   Resp: 18   Temp: 98.6 °F (37 °C)   TempSrc: Infrared   SpO2: 96%   Weight: 272 lb (123.4 kg)   Height: 5' 4\" (1.626 m)       Patient was given thefollowing medications:  Medications - No data to display    Patient presents emergency department for evaluation of left ankle sprain after rolling it while getting out of the car. On evaluation patient is alert and oriented in no acute distress. Patient has circumferential swelling to the left foot and ankle. Both lateral and medial malleolus are tender to palpation. Patient has 2+ dorsalis pedis pulse and capillary refill less than 2 seconds. X-ray shows no acute bony fracture. Patient will be given Ace wrap for comfort and crutches. Patient can follow up with primary care or orthopedic as they feel necessary. Patient likely has an ankle sprain due to the eversion injury. At this time I feel the patient is at low risk for neurovascular injury, acute bony fracture, ligamentous tear, other acute injury that would require further management. She'll take Tylenol at home for pain management as she cannot take anti-inflammatory medications. Patient RT has an appointment with her orthopedic surgeon on Monday and will follow up regarding this ankle then. FINAL IMPRESSION      1.  Sprain of left ankle, unspecified ligament, initial encounter          DISPOSITION/PLAN   DISPOSITION Decision To Discharge 04/25/2019 06:57:48 PM      PATIENT REFERREDTO:  Robert Umana MD  [facility] [address]  [address] [facility] [address] [place] [phone]    Schedule an appointment as soon as possible for a visit in 3 days  for re-evaluation    [facility]  [address] [place] [address]  [phone]    If symptoms worsen      DISCHARGE MEDICATIONS:  New Prescriptions    No medications on file       DISCONTINUED MEDICATIONS:  Discontinued Medications    No medications on file              (Please note that portions ofthis note were completed with a voice recognition program.  Efforts were made to edit the dictations but occasionally words are mis-transcribed.)    Nory Carlos PA-C (electronically signed)            Nory Carlos CARLOTA  04/25/19 1904

## 2019-04-25 NOTE — ED NOTES
Discharge instructions discussed with no questions or concerns. Pt. Lillian understanding to follow up with PCP and orthopedics. Pt. Wheel out via wheel chair and assisted into car with no signs of distress noted.        Adina Barrientos RN  04/25/19 Merari Erazo

## 2019-05-07 NOTE — PROGRESS NOTES
[facility] / [facility] ([facility]) [address] [facility], [address]    Acknowledgment of Informed Consent for Surgical or Medical Procedure and Sedation  I agree to allow doctor(s) CAROL KEENE and his/her associates or assistants, including residents and/or other qualified medical practitioner to perform the following medical treatment or procedure and to administer or direct the administration of sedation as necessary:  Procedure(s): L2-3, L3-4 LATERAL LUMBAR INTERBODY FUSION, L4-5 TRANSFORAMINAL LUMBAR INTERBODY FUSION WITH PEDICLE SCREW  My doctor has explained the following regarding the proposed procedure:   the explanation of the procedure   the benefits of the procedure   the potential problems that might occur during recuperation   the risks and side effects of the procedure which could include but are not limited to severe blood loss, infection, stroke or death   the benefits, risks and side effect of alternative procedures including the consequences of declining this procedure or any alternative procedures   the likelihood of achieving satisfactory results. I acknowledge no guarantee or assurance has been made to me regarding the results. I understand that during the course of this treatment/procedure, unforeseen conditions can occur which require an additional or different procedure. I agree to allow my physician or assistants to perform such extension of the original procedure as they may find necessary. I understand that sedation will often result in temporary impairment of memory and fine motor skills and that sedation can occasionally progress to a state of deep sedation or general anesthesia. I understand the risks of anesthesia for surgery include, but are not limited to, sore throat, hoarseness, injury to face, mouth, or teeth; nausea; headache; injury to blood vessels or nerves; death, brain damage, or paralysis.     I understand that if I have a Limitation of Treatment order in effect during my hospitalization, the order may or may not be in effect during this procedure. I give my doctor permission to give me blood or blood products. I understand that there are risks with receiving blood such as hepatitis, AIDS, fever, or allergic reaction. I acknowledge that the risks, benefits, and alternatives of this treatment have been explained to me and that no express or implied warranty has been given by the hospital, any blood bank, or any person or entity as to the blood or blood components transfused. At the discretion of my doctor, I agree to allow observers, equipment/product representatives and allow photographing, and/or televising of the procedure, provided my name or identity is maintained confidentially. I agree the hospital may dispose of or use for scientific or educational purposes any tissue, fluid, or body parts which may be removed.     ________________________________Date________Time______ am/pm  ([place] [facility])  Patient or Signature of Closest Relative or Legal Guardian    ________________________________Date________Time______am/pm      Page 1 of  1  Witness

## 2019-05-09 RX ORDER — COLCHICINE 0.6 MG/1
TABLET, FILM COATED ORAL
COMMUNITY
Start: 2019-04-17 | End: 2019-11-29

## 2019-05-09 RX ORDER — ALLOPURINOL 100 MG/1
TABLET ORAL
Refills: 3 | Status: ON HOLD | COMMUNITY
Start: 2019-04-17 | End: 2019-05-20 | Stop reason: HOSPADM

## 2019-05-09 RX ORDER — ATORVASTATIN CALCIUM 10 MG/1
10 TABLET, FILM COATED ORAL DAILY
COMMUNITY

## 2019-05-09 NOTE — PROGRESS NOTES
#2 if you have not been preregistered yet. On the day of your procedure bring your insurance card and photo ID. You will be registered at your bedside once brought back to your room. 5. DO NOT EAT ANYTHING eight hours prior to surgery. May have 8 ounces of water 4 hours prior to surgery. 6. MEDICATIONS    Take the following medications with a SMALL sip of water:  Metoprolol and norvasc   Use your usual dose of inhalers the morning of surgery. BRING your rescue inhaler with you to hospital.    Anesthesia does NOT want you to take insulin the morning of surgery. They will control your blood sugar while you are at the hospital. Please contact your ordering physician for instructions regarding your insulin the night before your procedure. If you have an insulin pump, please keep it set on basal rate. 7. Do not swallow water when brushing teeth. No gum, candy, mints or ice chips. Refrain from smoking or at least decrease the amount. 8. Dress in loose, comfortable clothing appropriate for redressing after your procedure. Do not wear jewelry (including body piercings), make-up (especially NO eye make-up), fingernail polish (NO toenail polish if foot/leg surgery), lotion, powders or metal hairclips. 9. Dentures, glasses, or contacts will need to be removed before your procedure. Bring cases for your glasses, contacts, dentures, or hearing aids to protect them while you are in surgery. 10. If you use a CPAP, please bring it with you on the day of your procedure. 11. We recommend that valuable personal  belongings such as cash, cell phones, e-tablets or jewelry, be left at home during your stay. The hospital will not be responsible for valuables that are not secured in the hospital safe. However, if your insurance requires a co-pay, you may want to bring a method of payment, i.e. Check or credit card, if you wish to pay your co-pay the day of surgery.       12. If you are to stay overnight, you may bring a bag with personal items. Please have any large items you may need brought in by your family after your arrival to your hospital room. 15. If you have a Living Will or Durable Power of , please bring a copy on the day of your procedure. 15. With your permission, one family member may accompany you while you are being prepared for surgery. Once you are ready, additional family members may join you. HOW WE KEEP YOU SAFE and WORK TO PREVENT SURGICAL SITE INFECTIONS:  1. Health care workers should always check your ID bracelet to verify your name and birth date. You will be asked many times to state your name, date of birth, and allergies. 2. Health care workers should always clean their hands with soap or alcohol gel before providing care to you. It is okay to ask anyone if they cleaned their hands before they touch you. 3. You will be actively involved in verifying the type of procedure you are having and ensuring the correct surgical site. This will be confirmed multiple times prior to your procedure. Do NOT kavita your surgery site UNLESS instructed to by your surgeon. 4. Do not shave or wax for 72 hours prior to procedure near your operative site. Shaving with a razor can irritate your skin and make it easier to develop an infection. On the day of your procedure, any hair that needs to be removed near the surgical site will be clipped by a healthcare worker using a special clippers designed to avoid skin irritation. 5. When you are in the operating room, your surgical site will be cleansed with a special soap, and in most cases, you will be given an antibiotic before the surgery begins. What to expect AFTER YOUR PROCEDURE:  1. Immediately following your procedure, your will be taken to the PACU for the first phase of your recovery.   Your nurse will help you recover from any potential side effects of anesthesia, such as extreme drowsiness, changes in your vital signs or

## 2019-05-09 NOTE — PROGRESS NOTES
PRE-OP INSTRUCTIONS FOR THE SURGICAL PATIENT YOU ARE UNABLE TO MAKE CONTACT FOR AN INTERVIEW:      1. Follow instructions for your ARRIVAL TIME as DIRECTED BY YOUR SURGEON. 2. Enter the MAIN entrance located on [address] and report to the desk. 3. Bring your insurance & prescription card and photo ID with you. You may also be asked to pay a co-pay, as you may want to bring a check or credit card with you. 4. Leave all other valuables at home. 5. Arrange for someone to drive you home and be with you for the first 24 hours after discharge. 6. You must contact your surgeon for ALL medication instructions, especially if taking blood thinners, aspirin, or diabetic medication. 7. A Pre-op History and Physical for surgery MUST be completed by your Physician or an Urgent Care within 30 days of your procedure date. Please bring a copy with you on the day of your procedure and along with any other testing performed. 8. DO NOT EAT ANYTHING eight hours prior to surgery. May have up to 8 ounces of water 4 hours prior to surgery. 9. No gum, candy, mints, or ice chips day of procedure. 10. Please refrain from drinking alcohol the day before or day of your procedure. 11. Please do not smoke the day of your procedure. 12. Dress in loose, comfortable clothing appropriate for redressing after your procedure. Do not wear jewelry (including body piercings), make-up, fingernail polish, lotion, powders or metal hairclips. 15. Contacts will need to be removed prior to surgery. You may want to bring your            Eye glasses to wear immediately before and after surgery. 14. Dentures will need to be removed before your procedure. 13. Bring cases for your glasses, contacts, dentures, or hearing aids to protect them while you are in surgery. 16. If you use a CPAP, please bring it with you on the day of your procedure.   17. Do not shave or wax for 72 hours prior to procedure near your operative site  18. FOR WOMAN OF CHILDBEARING AGE ONLY- please bring a urine sample with you on day of surgery or make sure we can collect on arrival.    If you have further questions, you may contact us at [phone]    Left instructions on patient's voicemail. Jaquelin Davidson. 5/9/2019 .12:34 PM

## 2019-05-10 ENCOUNTER — ANESTHESIA EVENT (OUTPATIENT)
Dept: OPERATING ROOM | Age: 69
DRG: 454 | End: 2019-05-10
Payer: MEDICARE

## 2019-05-13 ENCOUNTER — ANESTHESIA (OUTPATIENT)
Dept: OPERATING ROOM | Age: 69
DRG: 454 | End: 2019-05-13
Payer: MEDICARE

## 2019-05-13 ENCOUNTER — APPOINTMENT (OUTPATIENT)
Dept: GENERAL RADIOLOGY | Age: 69
DRG: 454 | End: 2019-05-13
Attending: NEUROLOGICAL SURGERY
Payer: MEDICARE

## 2019-05-13 ENCOUNTER — APPOINTMENT (OUTPATIENT)
Dept: CT IMAGING | Age: 69
DRG: 454 | End: 2019-05-13
Attending: NEUROLOGICAL SURGERY
Payer: MEDICARE

## 2019-05-13 ENCOUNTER — HOSPITAL ENCOUNTER (INPATIENT)
Age: 69
LOS: 7 days | Discharge: NURSING, REHAB OR LONG-TERM CARE | DRG: 454 | End: 2019-05-20
Attending: NEUROLOGICAL SURGERY | Admitting: NEUROLOGICAL SURGERY
Payer: MEDICARE

## 2019-05-13 VITALS
OXYGEN SATURATION: 100 % | TEMPERATURE: 97.5 F | RESPIRATION RATE: 8 BRPM | SYSTOLIC BLOOD PRESSURE: 125 MMHG | DIASTOLIC BLOOD PRESSURE: 93 MMHG

## 2019-05-13 DIAGNOSIS — Z98.1 S/P LUMBAR FUSION: Primary | ICD-10-CM

## 2019-05-13 PROBLEM — M43.16 SPONDYLOLISTHESIS OF LUMBAR REGION: Status: ACTIVE | Noted: 2019-05-13

## 2019-05-13 LAB
ABO/RH: NORMAL
ANTIBODY SCREEN: NORMAL

## 2019-05-13 PROCEDURE — 3600000014 HC SURGERY LEVEL 4 ADDTL 15MIN: Performed by: NEUROLOGICAL SURGERY

## 2019-05-13 PROCEDURE — 6360000002 HC RX W HCPCS: Performed by: NURSE ANESTHETIST, CERTIFIED REGISTERED

## 2019-05-13 PROCEDURE — 0SG1071 FUSION OF 2 OR MORE LUMBAR VERTEBRAL JOINTS WITH AUTOLOGOUS TISSUE SUBSTITUTE, POSTERIOR APPROACH, POSTERIOR COLUMN, OPEN APPROACH: ICD-10-PCS | Performed by: NEUROLOGICAL SURGERY

## 2019-05-13 PROCEDURE — 0SG10AJ FUSION OF 2 OR MORE LUMBAR VERTEBRAL JOINTS WITH INTERBODY FUSION DEVICE, POSTERIOR APPROACH, ANTERIOR COLUMN, OPEN APPROACH: ICD-10-PCS | Performed by: NEUROLOGICAL SURGERY

## 2019-05-13 PROCEDURE — 3600000004 HC SURGERY LEVEL 4 BASE: Performed by: NEUROLOGICAL SURGERY

## 2019-05-13 PROCEDURE — 72100 X-RAY EXAM L-S SPINE 2/3 VWS: CPT

## 2019-05-13 PROCEDURE — 2709999900 HC NON-CHARGEABLE SUPPLY: Performed by: NEUROLOGICAL SURGERY

## 2019-05-13 PROCEDURE — 3700000000 HC ANESTHESIA ATTENDED CARE: Performed by: NEUROLOGICAL SURGERY

## 2019-05-13 PROCEDURE — C9290 INJ, BUPIVACAINE LIPOSOME: HCPCS | Performed by: NEUROLOGICAL SURGERY

## 2019-05-13 PROCEDURE — C9359 IMPLNT,BON VOID FILLER-PUTTY: HCPCS | Performed by: NEUROLOGICAL SURGERY

## 2019-05-13 PROCEDURE — 2580000003 HC RX 258: Performed by: ANESTHESIOLOGY

## 2019-05-13 PROCEDURE — 2720000010 HC SURG SUPPLY STERILE: Performed by: NEUROLOGICAL SURGERY

## 2019-05-13 PROCEDURE — 7100000000 HC PACU RECOVERY - FIRST 15 MIN: Performed by: NEUROLOGICAL SURGERY

## 2019-05-13 PROCEDURE — 2580000003 HC RX 258: Performed by: NEUROLOGICAL SURGERY

## 2019-05-13 PROCEDURE — 2500000003 HC RX 250 WO HCPCS: Performed by: NEUROLOGICAL SURGERY

## 2019-05-13 PROCEDURE — 6360000002 HC RX W HCPCS: Performed by: PHYSICIAN ASSISTANT

## 2019-05-13 PROCEDURE — 77011 CT SCAN FOR LOCALIZATION: CPT

## 2019-05-13 PROCEDURE — 86900 BLOOD TYPING SEROLOGIC ABO: CPT

## 2019-05-13 PROCEDURE — 2580000003 HC RX 258: Performed by: NURSE ANESTHETIST, CERTIFIED REGISTERED

## 2019-05-13 PROCEDURE — 1200000000 HC SEMI PRIVATE

## 2019-05-13 PROCEDURE — 3E0102A INTRODUCTION OF ANTI-INFECTIVE ENVELOPE INTO SUBCUTANEOUS TISSUE, OPEN APPROACH: ICD-10-PCS | Performed by: NEUROLOGICAL SURGERY

## 2019-05-13 PROCEDURE — 86901 BLOOD TYPING SEROLOGIC RH(D): CPT

## 2019-05-13 PROCEDURE — C1713 ANCHOR/SCREW BN/BN,TIS/BN: HCPCS | Performed by: NEUROLOGICAL SURGERY

## 2019-05-13 PROCEDURE — 7100000001 HC PACU RECOVERY - ADDTL 15 MIN: Performed by: NEUROLOGICAL SURGERY

## 2019-05-13 PROCEDURE — 2780000010 HC IMPLANT OTHER: Performed by: NEUROLOGICAL SURGERY

## 2019-05-13 PROCEDURE — 2580000003 HC RX 258: Performed by: PHYSICIAN ASSISTANT

## 2019-05-13 PROCEDURE — 3700000001 HC ADD 15 MINUTES (ANESTHESIA): Performed by: NEUROLOGICAL SURGERY

## 2019-05-13 PROCEDURE — 6360000002 HC RX W HCPCS: Performed by: ANESTHESIOLOGY

## 2019-05-13 PROCEDURE — 2500000003 HC RX 250 WO HCPCS: Performed by: NURSE ANESTHETIST, CERTIFIED REGISTERED

## 2019-05-13 PROCEDURE — 6360000002 HC RX W HCPCS: Performed by: NEUROLOGICAL SURGERY

## 2019-05-13 PROCEDURE — 3209999900 FLUORO FOR SURGICAL PROCEDURES

## 2019-05-13 PROCEDURE — 01NB0ZZ RELEASE LUMBAR NERVE, OPEN APPROACH: ICD-10-PCS | Performed by: NEUROLOGICAL SURGERY

## 2019-05-13 PROCEDURE — 0SB20ZZ EXCISION OF LUMBAR VERTEBRAL DISC, OPEN APPROACH: ICD-10-PCS | Performed by: NEUROLOGICAL SURGERY

## 2019-05-13 PROCEDURE — P9045 ALBUMIN (HUMAN), 5%, 250 ML: HCPCS | Performed by: NURSE ANESTHETIST, CERTIFIED REGISTERED

## 2019-05-13 PROCEDURE — 6370000000 HC RX 637 (ALT 250 FOR IP): Performed by: PHYSICIAN ASSISTANT

## 2019-05-13 PROCEDURE — 86850 RBC ANTIBODY SCREEN: CPT

## 2019-05-13 DEVICE — SCREW SPNL DIA5.5MM OPN TULIP LOK RELINE: Type: IMPLANTABLE DEVICE | Site: SPINE LUMBAR | Status: FUNCTIONAL

## 2019-05-13 DEVICE — IMPLANTABLE DEVICE: Type: IMPLANTABLE DEVICE | Site: SPINE LUMBAR | Status: FUNCTIONAL

## 2019-05-13 DEVICE — SCREW SPNL L50MM DIA6.5MM POST THORACOLUMBOSACRAL POLYAX 2S: Type: IMPLANTABLE DEVICE | Site: SPINE LUMBAR | Status: FUNCTIONAL

## 2019-05-13 DEVICE — GRAFT BONE CHIP FRZ DRY CANC 60CC: Type: IMPLANTABLE DEVICE | Site: SPINE LUMBAR | Status: FUNCTIONAL

## 2019-05-13 DEVICE — ROD SPNL LORDTC 5.5X90 MM TI RELINE-O: Type: IMPLANTABLE DEVICE | Site: SPINE LUMBAR | Status: FUNCTIONAL

## 2019-05-13 DEVICE — SCREW BONE L130MM DIA4MM SCHNZ DISP: Type: IMPLANTABLE DEVICE | Site: SPINE LUMBAR | Status: FUNCTIONAL

## 2019-05-13 DEVICE — SCREW SPNL L45MM DIA5.5MM POST THORACOLUMBOSACRAL POLYAX 2S: Type: IMPLANTABLE DEVICE | Site: SPINE LUMBAR | Status: FUNCTIONAL

## 2019-05-13 DEVICE — SUBSTITUTE BONE GRFT M PROPEL DBM PUTTY: Type: IMPLANTABLE DEVICE | Site: SPINE LUMBAR | Status: FUNCTIONAL

## 2019-05-13 DEVICE — SCREW SPNL L45MM DIA7.5MM POST THORACOLUMBOSACRAL POLYAX 2S: Type: IMPLANTABLE DEVICE | Site: SPINE LUMBAR | Status: FUNCTIONAL

## 2019-05-13 DEVICE — SCREW SPNL L45MM DIA6.5MM POST THORACOLUMBOSACRAL POLYAX 2S: Type: IMPLANTABLE DEVICE | Site: SPINE LUMBAR | Status: FUNCTIONAL

## 2019-05-13 DEVICE — GRAFT CHIP CANC OSTEOCEL 10CC: Type: IMPLANTABLE DEVICE | Site: SPINE LUMBAR | Status: FUNCTIONAL

## 2019-05-13 RX ORDER — SODIUM CHLORIDE 0.9 % (FLUSH) 0.9 %
10 SYRINGE (ML) INJECTION PRN
Status: DISCONTINUED | OUTPATIENT
Start: 2019-05-13 | End: 2019-05-20 | Stop reason: HOSPADM

## 2019-05-13 RX ORDER — HYDROMORPHONE HCL 110MG/55ML
PATIENT CONTROLLED ANALGESIA SYRINGE INTRAVENOUS PRN
Status: DISCONTINUED | OUTPATIENT
Start: 2019-05-13 | End: 2019-05-13 | Stop reason: SDUPTHER

## 2019-05-13 RX ORDER — GABAPENTIN 600 MG/1
600 TABLET ORAL 3 TIMES DAILY PRN
Status: DISCONTINUED | OUTPATIENT
Start: 2019-05-13 | End: 2019-05-20 | Stop reason: HOSPADM

## 2019-05-13 RX ORDER — CEFAZOLIN SODIUM 1 G/3ML
INJECTION, POWDER, FOR SOLUTION INTRAMUSCULAR; INTRAVENOUS PRN
Status: DISCONTINUED | OUTPATIENT
Start: 2019-05-13 | End: 2019-05-13 | Stop reason: SDUPTHER

## 2019-05-13 RX ORDER — LIDOCAINE HYDROCHLORIDE 20 MG/ML
INJECTION, SOLUTION INTRAVENOUS PRN
Status: DISCONTINUED | OUTPATIENT
Start: 2019-05-13 | End: 2019-05-13 | Stop reason: SDUPTHER

## 2019-05-13 RX ORDER — DOCUSATE SODIUM 100 MG/1
100 CAPSULE, LIQUID FILLED ORAL 2 TIMES DAILY
Status: DISCONTINUED | OUTPATIENT
Start: 2019-05-13 | End: 2019-05-14

## 2019-05-13 RX ORDER — OXYCODONE HYDROCHLORIDE 5 MG/1
10 TABLET ORAL EVERY 4 HOURS PRN
Status: DISCONTINUED | OUTPATIENT
Start: 2019-05-13 | End: 2019-05-20 | Stop reason: HOSPADM

## 2019-05-13 RX ORDER — MEPERIDINE HYDROCHLORIDE 25 MG/ML
12.5 INJECTION INTRAMUSCULAR; INTRAVENOUS; SUBCUTANEOUS EVERY 5 MIN PRN
Status: DISCONTINUED | OUTPATIENT
Start: 2019-05-13 | End: 2019-05-13 | Stop reason: HOSPADM

## 2019-05-13 RX ORDER — SODIUM CHLORIDE 9 MG/ML
INJECTION, SOLUTION INTRAVENOUS CONTINUOUS
Status: DISCONTINUED | OUTPATIENT
Start: 2019-05-13 | End: 2019-05-14

## 2019-05-13 RX ORDER — ONDANSETRON 2 MG/ML
4 INJECTION INTRAMUSCULAR; INTRAVENOUS EVERY 6 HOURS PRN
Status: DISCONTINUED | OUTPATIENT
Start: 2019-05-13 | End: 2019-05-20 | Stop reason: HOSPADM

## 2019-05-13 RX ORDER — ONDANSETRON 2 MG/ML
4 INJECTION INTRAMUSCULAR; INTRAVENOUS
Status: DISCONTINUED | OUTPATIENT
Start: 2019-05-13 | End: 2019-05-13 | Stop reason: HOSPADM

## 2019-05-13 RX ORDER — GLYCOPYRROLATE 1 MG/5 ML
SYRINGE (ML) INTRAVENOUS PRN
Status: DISCONTINUED | OUTPATIENT
Start: 2019-05-13 | End: 2019-05-13 | Stop reason: SDUPTHER

## 2019-05-13 RX ORDER — VANCOMYCIN HYDROCHLORIDE 1 G/20ML
INJECTION, POWDER, LYOPHILIZED, FOR SOLUTION INTRAVENOUS PRN
Status: DISCONTINUED | OUTPATIENT
Start: 2019-05-13 | End: 2019-05-13 | Stop reason: ALTCHOICE

## 2019-05-13 RX ORDER — FENTANYL CITRATE 50 UG/ML
25 INJECTION, SOLUTION INTRAMUSCULAR; INTRAVENOUS EVERY 5 MIN PRN
Status: DISCONTINUED | OUTPATIENT
Start: 2019-05-13 | End: 2019-05-13 | Stop reason: HOSPADM

## 2019-05-13 RX ORDER — AMLODIPINE BESYLATE 10 MG/1
10 TABLET ORAL DAILY
Status: DISCONTINUED | OUTPATIENT
Start: 2019-05-14 | End: 2019-05-20 | Stop reason: HOSPADM

## 2019-05-13 RX ORDER — PROPOFOL 10 MG/ML
INJECTION, EMULSION INTRAVENOUS PRN
Status: DISCONTINUED | OUTPATIENT
Start: 2019-05-13 | End: 2019-05-13 | Stop reason: SDUPTHER

## 2019-05-13 RX ORDER — DIAZEPAM 5 MG/1
10 TABLET ORAL EVERY 6 HOURS PRN
Status: DISCONTINUED | OUTPATIENT
Start: 2019-05-13 | End: 2019-05-20 | Stop reason: HOSPADM

## 2019-05-13 RX ORDER — SUCCINYLCHOLINE/SOD CL,ISO/PF 200MG/10ML
SYRINGE (ML) INTRAVENOUS PRN
Status: DISCONTINUED | OUTPATIENT
Start: 2019-05-13 | End: 2019-05-13 | Stop reason: SDUPTHER

## 2019-05-13 RX ORDER — OXYCODONE HYDROCHLORIDE 5 MG/1
5 TABLET ORAL EVERY 4 HOURS PRN
Status: DISCONTINUED | OUTPATIENT
Start: 2019-05-13 | End: 2019-05-20 | Stop reason: HOSPADM

## 2019-05-13 RX ORDER — MIDAZOLAM HYDROCHLORIDE 1 MG/ML
INJECTION INTRAMUSCULAR; INTRAVENOUS PRN
Status: DISCONTINUED | OUTPATIENT
Start: 2019-05-13 | End: 2019-05-13 | Stop reason: SDUPTHER

## 2019-05-13 RX ORDER — FUROSEMIDE 80 MG
80 TABLET ORAL DAILY
Status: DISCONTINUED | OUTPATIENT
Start: 2019-05-13 | End: 2019-05-20 | Stop reason: HOSPADM

## 2019-05-13 RX ORDER — DEXAMETHASONE SODIUM PHOSPHATE 4 MG/ML
INJECTION, SOLUTION INTRA-ARTICULAR; INTRALESIONAL; INTRAMUSCULAR; INTRAVENOUS; SOFT TISSUE PRN
Status: DISCONTINUED | OUTPATIENT
Start: 2019-05-13 | End: 2019-05-13 | Stop reason: SDUPTHER

## 2019-05-13 RX ORDER — SODIUM CHLORIDE 0.9 % (FLUSH) 0.9 %
10 SYRINGE (ML) INJECTION EVERY 12 HOURS SCHEDULED
Status: DISCONTINUED | OUTPATIENT
Start: 2019-05-13 | End: 2019-05-20 | Stop reason: HOSPADM

## 2019-05-13 RX ORDER — HYDRALAZINE HYDROCHLORIDE 20 MG/ML
5 INJECTION INTRAMUSCULAR; INTRAVENOUS EVERY 10 MIN PRN
Status: DISCONTINUED | OUTPATIENT
Start: 2019-05-13 | End: 2019-05-13 | Stop reason: HOSPADM

## 2019-05-13 RX ORDER — COLCHICINE 0.6 MG/1
0.6 TABLET ORAL DAILY
Status: DISCONTINUED | OUTPATIENT
Start: 2019-05-13 | End: 2019-05-20 | Stop reason: HOSPADM

## 2019-05-13 RX ORDER — LABETALOL 20 MG/4 ML (5 MG/ML) INTRAVENOUS SYRINGE
5 EVERY 10 MIN PRN
Status: DISCONTINUED | OUTPATIENT
Start: 2019-05-13 | End: 2019-05-13 | Stop reason: HOSPADM

## 2019-05-13 RX ORDER — METOPROLOL TARTRATE 100 MG/1
100 TABLET ORAL DAILY
Status: DISCONTINUED | OUTPATIENT
Start: 2019-05-14 | End: 2019-05-20 | Stop reason: HOSPADM

## 2019-05-13 RX ORDER — ONDANSETRON 2 MG/ML
INJECTION INTRAMUSCULAR; INTRAVENOUS PRN
Status: DISCONTINUED | OUTPATIENT
Start: 2019-05-13 | End: 2019-05-13 | Stop reason: SDUPTHER

## 2019-05-13 RX ORDER — SODIUM CHLORIDE, SODIUM LACTATE, POTASSIUM CHLORIDE, CALCIUM CHLORIDE 600; 310; 30; 20 MG/100ML; MG/100ML; MG/100ML; MG/100ML
INJECTION, SOLUTION INTRAVENOUS CONTINUOUS PRN
Status: DISCONTINUED | OUTPATIENT
Start: 2019-05-13 | End: 2019-05-13 | Stop reason: SDUPTHER

## 2019-05-13 RX ORDER — LIDOCAINE HYDROCHLORIDE 10 MG/ML
1 INJECTION, SOLUTION EPIDURAL; INFILTRATION; INTRACAUDAL; PERINEURAL
Status: DISCONTINUED | OUTPATIENT
Start: 2019-05-13 | End: 2019-05-13 | Stop reason: HOSPADM

## 2019-05-13 RX ORDER — EPHEDRINE SULFATE 50 MG/ML
INJECTION, SOLUTION INTRAVENOUS PRN
Status: DISCONTINUED | OUTPATIENT
Start: 2019-05-13 | End: 2019-05-13 | Stop reason: SDUPTHER

## 2019-05-13 RX ORDER — SODIUM CHLORIDE 0.9 % (FLUSH) 0.9 %
10 SYRINGE (ML) INJECTION EVERY 12 HOURS SCHEDULED
Status: DISCONTINUED | OUTPATIENT
Start: 2019-05-13 | End: 2019-05-13 | Stop reason: HOSPADM

## 2019-05-13 RX ORDER — SODIUM CHLORIDE, SODIUM LACTATE, POTASSIUM CHLORIDE, CALCIUM CHLORIDE 600; 310; 30; 20 MG/100ML; MG/100ML; MG/100ML; MG/100ML
INJECTION, SOLUTION INTRAVENOUS CONTINUOUS
Status: DISCONTINUED | OUTPATIENT
Start: 2019-05-13 | End: 2019-05-13

## 2019-05-13 RX ORDER — SODIUM CHLORIDE 0.9 % (FLUSH) 0.9 %
10 SYRINGE (ML) INJECTION PRN
Status: DISCONTINUED | OUTPATIENT
Start: 2019-05-13 | End: 2019-05-13 | Stop reason: HOSPADM

## 2019-05-13 RX ORDER — LEVOTHYROXINE SODIUM 0.15 MG/1
150 TABLET ORAL DAILY
Status: DISCONTINUED | OUTPATIENT
Start: 2019-05-14 | End: 2019-05-20 | Stop reason: HOSPADM

## 2019-05-13 RX ORDER — FENTANYL CITRATE 50 UG/ML
50 INJECTION, SOLUTION INTRAMUSCULAR; INTRAVENOUS EVERY 5 MIN PRN
Status: DISCONTINUED | OUTPATIENT
Start: 2019-05-13 | End: 2019-05-13 | Stop reason: HOSPADM

## 2019-05-13 RX ORDER — ALBUMIN, HUMAN INJ 5% 5 %
SOLUTION INTRAVENOUS PRN
Status: DISCONTINUED | OUTPATIENT
Start: 2019-05-13 | End: 2019-05-13 | Stop reason: SDUPTHER

## 2019-05-13 RX ORDER — DEXAMETHASONE SODIUM PHOSPHATE 4 MG/ML
4 INJECTION, SOLUTION INTRA-ARTICULAR; INTRALESIONAL; INTRAMUSCULAR; INTRAVENOUS; SOFT TISSUE
Status: DISCONTINUED | OUTPATIENT
Start: 2019-05-13 | End: 2019-05-13 | Stop reason: HOSPADM

## 2019-05-13 RX ORDER — FENTANYL CITRATE 50 UG/ML
INJECTION, SOLUTION INTRAMUSCULAR; INTRAVENOUS PRN
Status: DISCONTINUED | OUTPATIENT
Start: 2019-05-13 | End: 2019-05-13 | Stop reason: SDUPTHER

## 2019-05-13 RX ORDER — ATORVASTATIN CALCIUM 10 MG/1
10 TABLET, FILM COATED ORAL DAILY
Status: DISCONTINUED | OUTPATIENT
Start: 2019-05-14 | End: 2019-05-20 | Stop reason: HOSPADM

## 2019-05-13 RX ORDER — POTASSIUM CHLORIDE 20 MEQ/1
20 TABLET, EXTENDED RELEASE ORAL DAILY
Status: DISCONTINUED | OUTPATIENT
Start: 2019-05-14 | End: 2019-05-20 | Stop reason: HOSPADM

## 2019-05-13 RX ORDER — ROCURONIUM BROMIDE 10 MG/ML
INJECTION, SOLUTION INTRAVENOUS PRN
Status: DISCONTINUED | OUTPATIENT
Start: 2019-05-13 | End: 2019-05-13 | Stop reason: SDUPTHER

## 2019-05-13 RX ORDER — NEOSTIGMINE METHYLSULFATE 5 MG/5 ML
SYRINGE (ML) INTRAVENOUS PRN
Status: DISCONTINUED | OUTPATIENT
Start: 2019-05-13 | End: 2019-05-13 | Stop reason: SDUPTHER

## 2019-05-13 RX ADMIN — DOCUSATE SODIUM 100 MG: 100 CAPSULE, LIQUID FILLED ORAL at 21:46

## 2019-05-13 RX ADMIN — EPHEDRINE SULFATE 10 MG: 50 INJECTION, SOLUTION INTRAMUSCULAR; INTRAVENOUS; SUBCUTANEOUS at 09:02

## 2019-05-13 RX ADMIN — EPHEDRINE SULFATE 10 MG: 50 INJECTION, SOLUTION INTRAMUSCULAR; INTRAVENOUS; SUBCUTANEOUS at 14:42

## 2019-05-13 RX ADMIN — EPHEDRINE SULFATE 10 MG: 50 INJECTION, SOLUTION INTRAMUSCULAR; INTRAVENOUS; SUBCUTANEOUS at 11:43

## 2019-05-13 RX ADMIN — Medication 0.3 MG: at 09:01

## 2019-05-13 RX ADMIN — HYDROMORPHONE HYDROCHLORIDE 0.25 MG: 1 INJECTION, SOLUTION INTRAMUSCULAR; INTRAVENOUS; SUBCUTANEOUS at 19:37

## 2019-05-13 RX ADMIN — SODIUM CHLORIDE, SODIUM LACTATE, POTASSIUM CHLORIDE, AND CALCIUM CHLORIDE: 600; 310; 30; 20 INJECTION, SOLUTION INTRAVENOUS at 11:41

## 2019-05-13 RX ADMIN — DEXTROSE MONOHYDRATE 3 G: 50 INJECTION, SOLUTION INTRAVENOUS at 07:58

## 2019-05-13 RX ADMIN — HYDROMORPHONE HYDROCHLORIDE 0.5 MG: 2 INJECTION, SOLUTION INTRAMUSCULAR; INTRAVENOUS; SUBCUTANEOUS at 14:55

## 2019-05-13 RX ADMIN — EPHEDRINE SULFATE 10 MG: 50 INJECTION, SOLUTION INTRAMUSCULAR; INTRAVENOUS; SUBCUTANEOUS at 08:22

## 2019-05-13 RX ADMIN — Medication 0.4 MG: at 08:44

## 2019-05-13 RX ADMIN — CEFAZOLIN SODIUM 3 MG: 1 POWDER, FOR SOLUTION INTRAMUSCULAR; INTRAVENOUS at 12:07

## 2019-05-13 RX ADMIN — ONDANSETRON 4 MG: 2 INJECTION INTRAMUSCULAR; INTRAVENOUS at 14:59

## 2019-05-13 RX ADMIN — Medication 2.5 MG: at 08:44

## 2019-05-13 RX ADMIN — HYDROMORPHONE HYDROCHLORIDE 0.25 MG: 1 INJECTION, SOLUTION INTRAMUSCULAR; INTRAVENOUS; SUBCUTANEOUS at 17:37

## 2019-05-13 RX ADMIN — PHENYLEPHRINE HYDROCHLORIDE 25 MCG/MIN: 10 INJECTION, SOLUTION INTRAMUSCULAR; INTRAVENOUS; SUBCUTANEOUS at 08:25

## 2019-05-13 RX ADMIN — SODIUM CHLORIDE: 9 INJECTION, SOLUTION INTRAVENOUS at 16:08

## 2019-05-13 RX ADMIN — Medication 0.3 MG: at 08:22

## 2019-05-13 RX ADMIN — SODIUM CHLORIDE, POTASSIUM CHLORIDE, SODIUM LACTATE AND CALCIUM CHLORIDE: 600; 310; 30; 20 INJECTION, SOLUTION INTRAVENOUS at 07:05

## 2019-05-13 RX ADMIN — FENTANYL CITRATE 50 MCG: 50 INJECTION INTRAMUSCULAR; INTRAVENOUS at 08:52

## 2019-05-13 RX ADMIN — PROPOFOL 200 MG: 10 INJECTION, EMULSION INTRAVENOUS at 08:13

## 2019-05-13 RX ADMIN — EPHEDRINE SULFATE 10 MG: 50 INJECTION, SOLUTION INTRAMUSCULAR; INTRAVENOUS; SUBCUTANEOUS at 10:38

## 2019-05-13 RX ADMIN — MIDAZOLAM HYDROCHLORIDE 2 MG: 2 INJECTION, SOLUTION INTRAMUSCULAR; INTRAVENOUS at 08:00

## 2019-05-13 RX ADMIN — HYDROMORPHONE HYDROCHLORIDE 0.5 MG: 1 INJECTION, SOLUTION INTRAMUSCULAR; INTRAVENOUS; SUBCUTANEOUS at 16:23

## 2019-05-13 RX ADMIN — ROCURONIUM BROMIDE 20 MG: 10 INJECTION, SOLUTION INTRAVENOUS at 08:20

## 2019-05-13 RX ADMIN — HYDROMORPHONE HYDROCHLORIDE 2 MG: 2 INJECTION, SOLUTION INTRAMUSCULAR; INTRAVENOUS; SUBCUTANEOUS at 09:29

## 2019-05-13 RX ADMIN — COLCHICINE 0.6 MG: 0.6 TABLET, FILM COATED ORAL at 21:45

## 2019-05-13 RX ADMIN — ALBUMIN (HUMAN) 250 ML: 12.5 SOLUTION INTRAVENOUS at 08:59

## 2019-05-13 RX ADMIN — FENTANYL CITRATE 50 MCG: 50 INJECTION INTRAMUSCULAR; INTRAVENOUS at 08:04

## 2019-05-13 RX ADMIN — SODIUM CHLORIDE, SODIUM LACTATE, POTASSIUM CHLORIDE, AND CALCIUM CHLORIDE: 600; 310; 30; 20 INJECTION, SOLUTION INTRAVENOUS at 08:04

## 2019-05-13 RX ADMIN — FUROSEMIDE 80 MG: 80 TABLET ORAL at 21:46

## 2019-05-13 RX ADMIN — DEXAMETHASONE SODIUM PHOSPHATE 4 MG: 4 INJECTION, SOLUTION INTRAMUSCULAR; INTRAVENOUS at 08:18

## 2019-05-13 RX ADMIN — CEFAZOLIN SODIUM 3 G: 1 INJECTION, POWDER, FOR SOLUTION INTRAMUSCULAR; INTRAVENOUS at 20:02

## 2019-05-13 RX ADMIN — HYDROMORPHONE HYDROCHLORIDE 0.5 MG: 2 INJECTION, SOLUTION INTRAMUSCULAR; INTRAVENOUS; SUBCUTANEOUS at 14:47

## 2019-05-13 RX ADMIN — SODIUM CHLORIDE, SODIUM LACTATE, POTASSIUM CHLORIDE, AND CALCIUM CHLORIDE: 600; 310; 30; 20 INJECTION, SOLUTION INTRAVENOUS at 08:57

## 2019-05-13 RX ADMIN — Medication 80 MG: at 08:14

## 2019-05-13 RX ADMIN — OXYCODONE HYDROCHLORIDE 10 MG: 5 TABLET ORAL at 21:45

## 2019-05-13 RX ADMIN — METHOCARBAMOL 1000 MG: 100 INJECTION, SOLUTION INTRAMUSCULAR; INTRAVENOUS at 16:10

## 2019-05-13 RX ADMIN — LIDOCAINE HYDROCHLORIDE 100 MG: 20 INJECTION, SOLUTION INTRAVENOUS at 08:13

## 2019-05-13 ASSESSMENT — PULMONARY FUNCTION TESTS
PIF_VALUE: 24
PIF_VALUE: 23
PIF_VALUE: 21
PIF_VALUE: 17
PIF_VALUE: 23
PIF_VALUE: 35
PIF_VALUE: 17
PIF_VALUE: 6
PIF_VALUE: 23
PIF_VALUE: 35
PIF_VALUE: 24
PIF_VALUE: 24
PIF_VALUE: 23
PIF_VALUE: 25
PIF_VALUE: 18
PIF_VALUE: 25
PIF_VALUE: 24
PIF_VALUE: 31
PIF_VALUE: 24
PIF_VALUE: 20
PIF_VALUE: 24
PIF_VALUE: 23
PIF_VALUE: 26
PIF_VALUE: 28
PIF_VALUE: 24
PIF_VALUE: 18
PIF_VALUE: 24
PIF_VALUE: 32
PIF_VALUE: 25
PIF_VALUE: 20
PIF_VALUE: 26
PIF_VALUE: 24
PIF_VALUE: 19
PIF_VALUE: 26
PIF_VALUE: 24
PIF_VALUE: 16
PIF_VALUE: 2
PIF_VALUE: 24
PIF_VALUE: 24
PIF_VALUE: 25
PIF_VALUE: 19
PIF_VALUE: 24
PIF_VALUE: 28
PIF_VALUE: 24
PIF_VALUE: 24
PIF_VALUE: 22
PIF_VALUE: 1
PIF_VALUE: 26
PIF_VALUE: 25
PIF_VALUE: 19
PIF_VALUE: 24
PIF_VALUE: 29
PIF_VALUE: 20
PIF_VALUE: 24
PIF_VALUE: 25
PIF_VALUE: 26
PIF_VALUE: 22
PIF_VALUE: 26
PIF_VALUE: 24
PIF_VALUE: 25
PIF_VALUE: 18
PIF_VALUE: 23
PIF_VALUE: 20
PIF_VALUE: 26
PIF_VALUE: 26
PIF_VALUE: 24
PIF_VALUE: 24
PIF_VALUE: 15
PIF_VALUE: 18
PIF_VALUE: 24
PIF_VALUE: 24
PIF_VALUE: 26
PIF_VALUE: 23
PIF_VALUE: 0
PIF_VALUE: 19
PIF_VALUE: 23
PIF_VALUE: 22
PIF_VALUE: 26
PIF_VALUE: 25
PIF_VALUE: 28
PIF_VALUE: 24
PIF_VALUE: 19
PIF_VALUE: 26
PIF_VALUE: 24
PIF_VALUE: 17
PIF_VALUE: 24
PIF_VALUE: 23
PIF_VALUE: 24
PIF_VALUE: 26
PIF_VALUE: 26
PIF_VALUE: 24
PIF_VALUE: 17
PIF_VALUE: 23
PIF_VALUE: 26
PIF_VALUE: 27
PIF_VALUE: 18
PIF_VALUE: 20
PIF_VALUE: 24
PIF_VALUE: 24
PIF_VALUE: 20
PIF_VALUE: 26
PIF_VALUE: 25
PIF_VALUE: 17
PIF_VALUE: 17
PIF_VALUE: 27
PIF_VALUE: 25
PIF_VALUE: 20
PIF_VALUE: 24
PIF_VALUE: 23
PIF_VALUE: 18
PIF_VALUE: 25
PIF_VALUE: 21
PIF_VALUE: 27
PIF_VALUE: 25
PIF_VALUE: 23
PIF_VALUE: 24
PIF_VALUE: 28
PIF_VALUE: 18
PIF_VALUE: 30
PIF_VALUE: 23
PIF_VALUE: 19
PIF_VALUE: 24
PIF_VALUE: 26
PIF_VALUE: 0
PIF_VALUE: 24
PIF_VALUE: 25
PIF_VALUE: 21
PIF_VALUE: 25
PIF_VALUE: 17
PIF_VALUE: 24
PIF_VALUE: 19
PIF_VALUE: 24
PIF_VALUE: 17
PIF_VALUE: 20
PIF_VALUE: 22
PIF_VALUE: 26
PIF_VALUE: 24
PIF_VALUE: 25
PIF_VALUE: 20
PIF_VALUE: 30
PIF_VALUE: 24
PIF_VALUE: 18
PIF_VALUE: 35
PIF_VALUE: 19
PIF_VALUE: 24
PIF_VALUE: 24
PIF_VALUE: 23
PIF_VALUE: 26
PIF_VALUE: 24
PIF_VALUE: 22
PIF_VALUE: 22
PIF_VALUE: 24
PIF_VALUE: 24
PIF_VALUE: 1
PIF_VALUE: 20
PIF_VALUE: 25
PIF_VALUE: 22
PIF_VALUE: 18
PIF_VALUE: 24
PIF_VALUE: 17
PIF_VALUE: 17
PIF_VALUE: 23
PIF_VALUE: 24
PIF_VALUE: 24
PIF_VALUE: 19
PIF_VALUE: 24
PIF_VALUE: 35
PIF_VALUE: 17
PIF_VALUE: 18
PIF_VALUE: 24
PIF_VALUE: 28
PIF_VALUE: 24
PIF_VALUE: 20
PIF_VALUE: 26
PIF_VALUE: 18
PIF_VALUE: 24
PIF_VALUE: 22
PIF_VALUE: 17
PIF_VALUE: 20
PIF_VALUE: 18
PIF_VALUE: 20
PIF_VALUE: 18
PIF_VALUE: 18
PIF_VALUE: 24
PIF_VALUE: 23
PIF_VALUE: 24
PIF_VALUE: 25
PIF_VALUE: 23
PIF_VALUE: 26
PIF_VALUE: 37
PIF_VALUE: 24
PIF_VALUE: 31
PIF_VALUE: 28
PIF_VALUE: 0
PIF_VALUE: 25
PIF_VALUE: 29
PIF_VALUE: 24
PIF_VALUE: 17
PIF_VALUE: 26
PIF_VALUE: 27
PIF_VALUE: 18
PIF_VALUE: 24
PIF_VALUE: 26
PIF_VALUE: 25
PIF_VALUE: 23
PIF_VALUE: 24
PIF_VALUE: 24
PIF_VALUE: 19
PIF_VALUE: 23
PIF_VALUE: 24
PIF_VALUE: 18
PIF_VALUE: 23
PIF_VALUE: 24
PIF_VALUE: 25
PIF_VALUE: 22
PIF_VALUE: 24
PIF_VALUE: 22
PIF_VALUE: 26
PIF_VALUE: 27
PIF_VALUE: 23
PIF_VALUE: 22
PIF_VALUE: 25
PIF_VALUE: 25
PIF_VALUE: 23
PIF_VALUE: 20
PIF_VALUE: 24
PIF_VALUE: 18
PIF_VALUE: 16
PIF_VALUE: 19
PIF_VALUE: 18
PIF_VALUE: 19
PIF_VALUE: 26
PIF_VALUE: 24
PIF_VALUE: 24
PIF_VALUE: 25
PIF_VALUE: 35
PIF_VALUE: 19
PIF_VALUE: 20
PIF_VALUE: 25
PIF_VALUE: 25
PIF_VALUE: 24
PIF_VALUE: 24
PIF_VALUE: 31
PIF_VALUE: 26
PIF_VALUE: 23
PIF_VALUE: 20
PIF_VALUE: 18
PIF_VALUE: 18
PIF_VALUE: 25
PIF_VALUE: 31
PIF_VALUE: 19
PIF_VALUE: 24
PIF_VALUE: 27
PIF_VALUE: 24
PIF_VALUE: 24
PIF_VALUE: 27
PIF_VALUE: 24
PIF_VALUE: 32
PIF_VALUE: 25
PIF_VALUE: 23
PIF_VALUE: 20
PIF_VALUE: 20
PIF_VALUE: 23
PIF_VALUE: 24
PIF_VALUE: 17
PIF_VALUE: 28
PIF_VALUE: 26
PIF_VALUE: 23
PIF_VALUE: 27
PIF_VALUE: 26
PIF_VALUE: 24
PIF_VALUE: 16
PIF_VALUE: 24
PIF_VALUE: 29
PIF_VALUE: 23
PIF_VALUE: 23
PIF_VALUE: 22
PIF_VALUE: 17
PIF_VALUE: 17
PIF_VALUE: 26
PIF_VALUE: 3
PIF_VALUE: 22
PIF_VALUE: 23
PIF_VALUE: 26
PIF_VALUE: 30
PIF_VALUE: 17
PIF_VALUE: 20
PIF_VALUE: 26
PIF_VALUE: 1
PIF_VALUE: 20
PIF_VALUE: 24
PIF_VALUE: 24
PIF_VALUE: 22
PIF_VALUE: 24
PIF_VALUE: 19
PIF_VALUE: 28
PIF_VALUE: 23
PIF_VALUE: 24
PIF_VALUE: 26
PIF_VALUE: 22
PIF_VALUE: 24
PIF_VALUE: 24
PIF_VALUE: 28
PIF_VALUE: 24
PIF_VALUE: 34
PIF_VALUE: 24
PIF_VALUE: 19
PIF_VALUE: 23
PIF_VALUE: 29
PIF_VALUE: 26
PIF_VALUE: 19
PIF_VALUE: 22
PIF_VALUE: 23
PIF_VALUE: 26
PIF_VALUE: 17
PIF_VALUE: 19
PIF_VALUE: 19
PIF_VALUE: 34
PIF_VALUE: 17
PIF_VALUE: 12
PIF_VALUE: 24
PIF_VALUE: 23
PIF_VALUE: 25
PIF_VALUE: 24
PIF_VALUE: 17
PIF_VALUE: 23
PIF_VALUE: 24
PIF_VALUE: 21
PIF_VALUE: 18
PIF_VALUE: 24
PIF_VALUE: 17
PIF_VALUE: 18
PIF_VALUE: 22
PIF_VALUE: 35
PIF_VALUE: 35
PIF_VALUE: 24
PIF_VALUE: 25
PIF_VALUE: 18
PIF_VALUE: 24
PIF_VALUE: 25
PIF_VALUE: 30
PIF_VALUE: 23
PIF_VALUE: 18
PIF_VALUE: 25
PIF_VALUE: 0
PIF_VALUE: 26
PIF_VALUE: 24
PIF_VALUE: 18
PIF_VALUE: 19
PIF_VALUE: 23
PIF_VALUE: 17
PIF_VALUE: 25
PIF_VALUE: 19
PIF_VALUE: 23
PIF_VALUE: 2
PIF_VALUE: 17
PIF_VALUE: 23
PIF_VALUE: 23
PIF_VALUE: 25
PIF_VALUE: 27
PIF_VALUE: 23
PIF_VALUE: 23
PIF_VALUE: 26
PIF_VALUE: 23
PIF_VALUE: 19
PIF_VALUE: 25
PIF_VALUE: 24
PIF_VALUE: 24
PIF_VALUE: 18
PIF_VALUE: 17
PIF_VALUE: 21
PIF_VALUE: 24
PIF_VALUE: 22
PIF_VALUE: 23
PIF_VALUE: 35
PIF_VALUE: 17
PIF_VALUE: 26
PIF_VALUE: 17
PIF_VALUE: 26
PIF_VALUE: 19
PIF_VALUE: 24
PIF_VALUE: 23
PIF_VALUE: 30
PIF_VALUE: 22
PIF_VALUE: 23
PIF_VALUE: 18
PIF_VALUE: 19
PIF_VALUE: 20
PIF_VALUE: 27
PIF_VALUE: 28
PIF_VALUE: 17
PIF_VALUE: 17
PIF_VALUE: 24
PIF_VALUE: 23
PIF_VALUE: 32
PIF_VALUE: 23
PIF_VALUE: 26
PIF_VALUE: 26
PIF_VALUE: 22
PIF_VALUE: 24
PIF_VALUE: 23
PIF_VALUE: 26
PIF_VALUE: 24
PIF_VALUE: 20
PIF_VALUE: 26
PIF_VALUE: 17
PIF_VALUE: 27
PIF_VALUE: 24
PIF_VALUE: 22
PIF_VALUE: 25
PIF_VALUE: 25
PIF_VALUE: 23
PIF_VALUE: 25
PIF_VALUE: 22
PIF_VALUE: 24
PIF_VALUE: 17
PIF_VALUE: 1
PIF_VALUE: 22
PIF_VALUE: 26
PIF_VALUE: 26
PIF_VALUE: 24
PIF_VALUE: 23
PIF_VALUE: 25
PIF_VALUE: 3
PIF_VALUE: 23
PIF_VALUE: 25
PIF_VALUE: 19

## 2019-05-13 ASSESSMENT — PAIN SCALES - GENERAL
PAINLEVEL_OUTOF10: 6
PAINLEVEL_OUTOF10: 7
PAINLEVEL_OUTOF10: 6
PAINLEVEL_OUTOF10: 7

## 2019-05-13 ASSESSMENT — PAIN DESCRIPTION - LOCATION
LOCATION: BACK
LOCATION: BACK

## 2019-05-13 ASSESSMENT — PAIN DESCRIPTION - ORIENTATION
ORIENTATION: LOWER
ORIENTATION: LOWER

## 2019-05-13 ASSESSMENT — PAIN DESCRIPTION - PROGRESSION
CLINICAL_PROGRESSION: GRADUALLY WORSENING
CLINICAL_PROGRESSION: NOT CHANGED

## 2019-05-13 ASSESSMENT — PAIN DESCRIPTION - ONSET
ONSET: AWAKENED FROM SLEEP
ONSET: ON-GOING

## 2019-05-13 ASSESSMENT — PAIN DESCRIPTION - DESCRIPTORS
DESCRIPTORS: ACHING
DESCRIPTORS: ACHING;DISCOMFORT

## 2019-05-13 ASSESSMENT — PAIN - FUNCTIONAL ASSESSMENT: PAIN_FUNCTIONAL_ASSESSMENT: PREVENTS OR INTERFERES WITH MANY ACTIVE NOT PASSIVE ACTIVITIES

## 2019-05-13 ASSESSMENT — PAIN DESCRIPTION - PAIN TYPE
TYPE: SURGICAL PAIN
TYPE: SURGICAL PAIN

## 2019-05-13 ASSESSMENT — PAIN DESCRIPTION - FREQUENCY
FREQUENCY: INTERMITTENT
FREQUENCY: INTERMITTENT

## 2019-05-13 NOTE — OP NOTE
Operative Report    PATIENT NAME: Gay Smith OF BIRTH: 1950  MEDICAL RECORD# [id number]  SURGERY DATE: 5/13/2019  SURGEON:  Meka Ballesteros MD, PhD  ASSISTANT:  Caitlyn Mcdowell PA-C  DICTATED BY: Meka Ballesteros MD, PhD      PREOPERATIVE DIAGNOSIS:  1.  Degenerative lumbar kyphoscoliosis  2.  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   Same.     PROCEDURES PERFORMED:  1.  Left-sided far lateral approach to the L2-3 and L3-4 interspace for far lateral discectomy. 2.  Placement of lateral 12 mm, 10 degree lordotic NuVasive PEEK interbody cages packed with Osteocell into the L3-4 interspace  3. Placement of lateral 10 mm, 10 degree lordotic NuVasive PEEK interbody cages packed with Osteocell into the L2-3 interspace  3.  Left-sided decompressive trans facet approach to the L4-5 interspaces for foraminotomy and far lateral discectomy. 4.  Placement of Titanium MLX TLIF cage packed with Osteocell into the L4-5 interspaces. 5.  Transforaminal lumbar interbody fusion at L4-5. 6.  Placement of NuVasive pedicle screws and rods into the pedicles of L2-L5 bilaterally. 7.  Posterolateral facet arthrodesis at L2-5.  8.  Stage 2 [place] osteotomies at L4-5  9.  Correction of spondylolisthesis, L4-5  10.  Intraoperative monitoring for sensory potentials, stimulation of lumbosacral plexus     ANESTHESIA:  General     INDICATIONS FOR SURGERY:  The patient is a 80 y. o. with back and intractable radicular leg pain. Her symptoms failed to respond to conservative intervention. We had discussed the potential need for more extensive correction prior to her first operation, and she had wished to minimize surgery.  Having failed conservative management and experiencing persistent symptoms, however, the patient elected to proceed ahead with the surgical option of L2-3, L3-4 XLIF, and L4-5 TLIF for indirect central decompression and foraminal decompression, a well as correction of spondylolisthesis.     DETAILS OF PROCEDURE: The patient was brought to the operating room and placed under general anesthesia.  He was then placed lateral. All bony prominences were inspected and padded prior to sterile draping. The hips and shoulders were secured with silk tape to ensure true lateral placement, and the table was broke at the hip to open the lateral space. The left flank was then prepped and draped in the usual sterile fashion. Fluoroscopy was used to verify the incision over the L3-4 interspace and ensure true lateral position.      Using a #15 blade knife, the skin was incised. The oblique fascia was incised with a bovie and blunt dissection was then used within the retroperitoneal space to develop the plane anterior to transverse processes of L3-4.  We used biplanar fluoroscopic imaging to localize and target the L3-4 interspace with stimulator probe. Once over the interspace, stimulation was performed to avoid the nerve roots within the psoas muscle. This was gradually advanced under stimulation into the lateral annulus of L3-4. The exposure was progressively dilated with the tubular retractors, stimulating between each consecutive dilation. The retractor was then advanced over the dilators and fixed in place. Fluoroscopy verified true orthogonal position over the L3-4 space. A #4 Penfield dissector was used to sweep the psoas muscle. The entrance into the disc was again stimulated with no response and the posterior delaney placed into the disc space to secure the retractor. The ALL retractor was then placed and secured. The retractor blades were distracted to allow for proper visualization. A #15 blade was used to incise the lateral annulus. A discectomy was performed using pituitary rongeurs, curettes and cob dissectors. The contralateral annulus was transected using the cob dissector.  Once the disc preparation was complete, a #10 and then #12 trial was ligamentum flavum was removed with a 2 mm punch microsurgically and released from the underlying dura and exiting L4-5 nerve roots, bilaterally. The interspace was then incised with a #15 blade knife, and all the disc material grossly removed with pituitary forceps, rasps, and rongeurs. The far lateral portion of the disc was also removed decompressing the exiting L4 root. This was done again from both sides. An appropriately sized NuVasive MLX expandable interbody cage was then trailed and packed with Osteocell. This was carefully impacted into the interspace and expanded to establish an interbody arthrodesis at L4-5. The graft was then packed with Osteocell using the attachment. The plate was then secured and final tightened.     The wound was copiously irrigated with antibiotic impregnated solution with the Pulsivac . Rods were then introduced. The L5 screw caps were final tightened with the torque limited wrench. The L4 screws were then reduced to the rods bilaterally, reducing the spondylolisthesis. The wound was again irrigated. The posterior elements and transverse processes were decorticated using the high speed drill and fusion material consisting of bone chip, DBX and autograft bone was overlaid to establish a posterolateral fusion from L2-5. A medium hemovac drain was placed in the subfascial plane.     The wound was then closed in the usual fashion using interrupted 0 Vicryl sutures in the fascia (1 g of Vancomycon powder was placed in the suprafascial space), followed by running 4-0 Monocryl in the subcuticular layer. A Dermabond dressing was then applied. The patient was extubated in the operating room and transferred to the recovery room in stable condition. There were no complications. All needle, instrument, and sponge counts were correct.  Neuro-monitoring remained stable.     In accordance with CMS guidelines, I attest that I was present for the entire procedure from the creation of the skin incision to the closure.     ESTIMATED BLOOD LOSS: 107 mL     COMPLICATIONS: No complications apparent.     DISPOSITION: The patient was transferred to the PACU in stable condition, awake, alert, and moving all extremities on command.

## 2019-05-13 NOTE — PROGRESS NOTES
Patient arrived from OR to PACU # 13 s/p  L2-3, L3-4 LATERAL LUMBAR INTERBODY FUSION, L4-5 TRANSFORAMINAL LUMBAR INTERBODY FUSION WITH PEDICLE SCREW per . Attached to PACU monitoring device, report received from CRNA who reported no problems intraoperatively, VSS. Patient sedated on arrival to PACU, continue to monitor.

## 2019-05-13 NOTE — H&P
Donn Barlow    [id number]    [facility] Same Day Surgery Update H & P  Department of General Surgery   Surgical Service   Pre-operative History and Physical  Last H & P within the last 30 days. DIAGNOSIS:   LUMBAR SPONDYLOLISTHESIS    PROCEDURE:  IL INSJ BIOMCHN DEV INTERVERTEBRAL DSC SPC W/ARTHRD [57401] (L2-3, L3-4 LATERAL LUMBAR INTERBODY FUSION)  IL LUMBAR SPINE FUSN,POST INTRBDY [19040] (L4-5 TRANSFORAMINAL LUMBAR INTERBODY FUSION WITH PEDICLE SCREW)     HISTORY OF PRESENT ILLNESS:    Morbidly obese (BMI 5586), 80year old female with chronic lumbar pain and bilateral LE (R>L) pain, numbness and tingling. The symptoms have been recalcitrant to conservative treatment and the patient presents today for the above procedure.      Past Medical History:        Diagnosis Date    Arthritis     Back pain     Fibromyalgia     Gout     Hiatal hernia     Hyperlipidemia     Hypertension     Peripheral neuropathy     Spondylolisthesis, lumbar region     Thyroid disease      Past Surgical History:        Procedure Laterality Date    CARPAL TUNNEL RELEASE      and nerve surgery    ENDOSCOPY, COLON, DIAGNOSTIC      HYSTERECTOMY      THYROID SURGERY       Past Social History:  Social History     Socioeconomic History    Marital status:      Spouse name: None    Number of children: None    Years of education: None    Highest education level: None   Occupational History    None   Social Needs    Financial resource strain: None    Food insecurity:     Worry: None     Inability: None    Transportation needs:     Medical: None     Non-medical: None   Tobacco Use    Smoking status: Never Smoker    Smokeless tobacco: Never Used   Substance and Sexual Activity    Alcohol use: No    Drug use: No    Sexual activity: None   Lifestyle    Physical activity:     Days per week: None     Minutes per session: None    Stress: None   Relationships    Social connections:     Talks on phone: None Gets together: None     Attends Tenriism service: None     Active member of club or organization: None     Attends meetings of clubs or organizations: None     Relationship status: None    Intimate partner violence:     Fear of current or ex partner: None     Emotionally abused: None     Physically abused: None     Forced sexual activity: None   Other Topics Concern    None   Social History Narrative    None         Medications Prior to Admission:      Prior to Admission medications    Medication Sig Start Date End Date Taking? Authorizing Provider   atorvastatin (LIPITOR) 10 MG tablet Take 10 mg by mouth daily   Yes Historical Provider, MD   potassium chloride (KLOR-CON M) 20 MEQ extended release tablet Take 20 mEq by mouth daily   Yes Historical Provider, MD   metoprolol (LOPRESSOR) 100 MG tablet Take 100 mg by mouth daily   Yes Historical Provider, MD   gabapentin (NEURONTIN) 600 MG tablet Take 600 mg by mouth 3 times daily as needed. Yes Historical Provider, MD   oxyCODONE-acetaminophen (PERCOCET) 5-325 MG per tablet as needed. 2/20/17  Yes Historical Provider, MD   levothyroxine (SYNTHROID) 150 MCG tablet Take 150 mcg by mouth Daily. Yes Historical Provider, MD   furosemide (LASIX) 80 MG tablet Take 80 mg by mouth daily. Yes Historical Provider, MD   amLODIPine (NORVASC) 10 MG tablet Take 10 mg by mouth daily.    Yes Historical Provider, MD   COLCRYS 0.6 MG tablet  4/17/19   Historical Provider, MD   allopurinol (ZYLOPRIM) 100 MG tablet TAKE 1 TABLET BY MOUTH EVERY DAY 4/17/19   Historical Provider, MD         Allergies:  Lisinopril    PHYSICAL EXAM:      BP (!) 142/96   Pulse 65   Temp 98.3 °F (36.8 °C) (Oral)   Resp 16   Ht 5' 4\" (1.626 m)   Wt 273 lb (123.8 kg)   SpO2 95%   BMI 46.86 kg/m²      Heart:  Regular rate and rhythm, No murmur noted    Lungs: No increased work of breathing, good air exchange, clear to ausculation bilaterally     Abdomen:  Soft, non-distended, non-tender, normal active bowel sounds, no masses palpated    ASSESSMENT AND PLAN:    1. Patient seen and focused exam done today- no new changes since last physical exam on 4/29/19    2. Access to ancillary services are available per request of the provider.     SHALA Daily - GHAZAL     5/13/2019

## 2019-05-13 NOTE — ANESTHESIA PRE PROCEDURE
Department of Anesthesiology  Preprocedure Note       Name:  Deshawn Castañeda   Age:  80 y.o.  :  1950                                          MRN:  [id number]         Date:  2019      Surgeon: Italo Bolivar):  Srinivasa Rosas MD    Procedure: L2-3, L3-4 LATERAL LUMBAR INTERBODY FUSION (N/A )  L4-5 TRANSFORAMINAL LUMBAR INTERBODY FUSION WITH PEDICLE SCREW (N/A )    Medications prior to admission:   Prior to Admission medications    Medication Sig Start Date End Date Taking? Authorizing Provider   atorvastatin (LIPITOR) 10 MG tablet Take 10 mg by mouth daily   Yes Historical Provider, MD   potassium chloride (KLOR-CON M) 20 MEQ extended release tablet Take 20 mEq by mouth daily   Yes Historical Provider, MD   metoprolol (LOPRESSOR) 100 MG tablet Take 100 mg by mouth daily   Yes Historical Provider, MD   gabapentin (NEURONTIN) 600 MG tablet Take 600 mg by mouth 3 times daily as needed. Yes Historical Provider, MD   oxyCODONE-acetaminophen (PERCOCET) 5-325 MG per tablet as needed. 17  Yes Historical Provider, MD   levothyroxine (SYNTHROID) 150 MCG tablet Take 150 mcg by mouth Daily. Yes Historical Provider, MD   furosemide (LASIX) 80 MG tablet Take 80 mg by mouth daily. Yes Historical Provider, MD   amLODIPine (NORVASC) 10 MG tablet Take 10 mg by mouth daily.    Yes Historical Provider, MD   COLCRYS 0.6 MG tablet  19   Historical Provider, MD   allopurinol (ZYLOPRIM) 100 MG tablet TAKE 1 TABLET BY MOUTH EVERY DAY 19   Historical Provider, MD       Current medications:    Current Facility-Administered Medications   Medication Dose Route Frequency Provider Last Rate Last Dose    ceFAZolin (ANCEF) 3 g in dextrose 5 % 100 mL IVPB  3 g Intravenous Once Srinivasa Rosas MD        lactated ringers infusion   Intravenous Continuous Kourtney Sandoval MD        sodium chloride flush 0.9 % injection 10 mL  10 mL Intravenous 2 times per day Kourtney Sandoval MD        sodium chloride flush 0.9 % food consumption: 05/12/19    BMI:   Wt Readings from Last 3 Encounters:   05/13/19 273 lb (123.8 kg)   04/25/19 272 lb (123.4 kg)   12/26/18 270 lb (122.5 kg)     Body mass index is 46.86 kg/m². CBC:   Lab Results   Component Value Date    WBC 14.0 03/04/2019    RBC 4.75 03/04/2019    HGB 10.9 03/04/2019    HCT 34.2 03/04/2019    MCV 71.9 03/04/2019    RDW 16.1 03/04/2019     03/04/2019       CMP:   Lab Results   Component Value Date     08/15/2017    K 3.3 08/15/2017     08/15/2017    CO2 26 08/15/2017    BUN 15 08/15/2017    CREATININE 0.7 08/15/2017    GFRAA >60 08/15/2017    AGRATIO 0.9 02/24/2017    LABGLOM >60 08/15/2017    GLUCOSE 110 08/15/2017    PROT 7.5 02/24/2017    CALCIUM 9.3 08/15/2017    BILITOT 0.3 02/24/2017    ALKPHOS 61 02/24/2017    AST 45 02/24/2017    ALT 24 02/24/2017       POC Tests: No results for input(s): POCGLU, POCNA, POCK, POCCL, POCBUN, POCHEMO, POCHCT in the last 72 hours. Coags:   Lab Results   Component Value Date    PROTIME 14.3 02/24/2017    INR 1.27 02/24/2017       HCG (If Applicable): No results found for: PREGTESTUR, PREGSERUM, HCG, HCGQUANT     ABGs: No results found for: PHART, PO2ART, HRQ9WDE, KPF1NCJ, BEART, F4PROOAP     Type & Screen (If Applicable):  No results found for: LABABO, [address]    Anesthesia Evaluation  Patient summary reviewed and Nursing notes reviewed  Airway: Mallampati: III  TM distance: >3 FB   Neck ROM: limited  Mouth opening: > = 3 FB Dental:          Pulmonary:normal exam  breath sounds clear to auscultation                             Cardiovascular:Negative CV ROS          ECG reviewed  Rhythm: regular  Rate: normal  Echocardiogram reviewed                  Neuro/Psych:               GI/Hepatic/Renal: Neg GI/Hepatic/Renal ROS            Endo/Other: Negative Endo/Other ROS                    Abdominal:           Vascular: negative vascular ROS.                                        Anesthesia Plan      general     ASA 3 Induction: intravenous and inhalational.    MIPS: Postoperative opioids intended and Prophylactic antiemetics administered. Anesthetic plan and risks discussed with patient.         Attending anesthesiologist reviewed and agrees with Viridiana Guerra MD   5/13/2019

## 2019-05-14 ENCOUNTER — APPOINTMENT (OUTPATIENT)
Dept: GENERAL RADIOLOGY | Age: 69
DRG: 454 | End: 2019-05-14
Attending: NEUROLOGICAL SURGERY
Payer: MEDICARE

## 2019-05-14 LAB
ALBUMIN SERPL-MCNC: 3.5 G/DL (ref 3.4–5)
ANION GAP SERPL CALCULATED.3IONS-SCNC: 18 MMOL/L (ref 3–16)
BUN BLDV-MCNC: 23 MG/DL (ref 7–20)
CALCIUM SERPL-MCNC: 8.7 MG/DL (ref 8.3–10.6)
CHLORIDE BLD-SCNC: 103 MMOL/L (ref 99–110)
CO2: 21 MMOL/L (ref 21–32)
CREAT SERPL-MCNC: 0.8 MG/DL (ref 0.6–1.2)
GFR AFRICAN AMERICAN: >60
GFR NON-AFRICAN AMERICAN: >60
GLUCOSE BLD-MCNC: 142 MG/DL (ref 70–99)
HCT VFR BLD CALC: 27.1 % (ref 36–48)
HEMOGLOBIN: 8.6 G/DL (ref 12–16)
MCH RBC QN AUTO: 23.2 PG (ref 26–34)
MCHC RBC AUTO-ENTMCNC: 31.6 G/DL (ref 31–36)
MCV RBC AUTO: 73.6 FL (ref 80–100)
PDW BLD-RTO: 17.9 % (ref 12.4–15.4)
PHOSPHORUS: 5.4 MG/DL (ref 2.5–4.9)
PLATELET # BLD: 244 K/UL (ref 135–450)
PMV BLD AUTO: 7.9 FL (ref 5–10.5)
POTASSIUM SERPL-SCNC: 3.6 MMOL/L (ref 3.5–5.1)
RBC # BLD: 3.68 M/UL (ref 4–5.2)
SODIUM BLD-SCNC: 142 MMOL/L (ref 136–145)
WBC # BLD: 17 K/UL (ref 4–11)

## 2019-05-14 PROCEDURE — 36415 COLL VENOUS BLD VENIPUNCTURE: CPT

## 2019-05-14 PROCEDURE — 6360000002 HC RX W HCPCS: Performed by: PHYSICIAN ASSISTANT

## 2019-05-14 PROCEDURE — 51798 US URINE CAPACITY MEASURE: CPT

## 2019-05-14 PROCEDURE — 6370000000 HC RX 637 (ALT 250 FOR IP): Performed by: NURSE PRACTITIONER

## 2019-05-14 PROCEDURE — 6360000002 HC RX W HCPCS: Performed by: NURSE PRACTITIONER

## 2019-05-14 PROCEDURE — 72100 X-RAY EXAM L-S SPINE 2/3 VWS: CPT

## 2019-05-14 PROCEDURE — 51701 INSERT BLADDER CATHETER: CPT

## 2019-05-14 PROCEDURE — 6370000000 HC RX 637 (ALT 250 FOR IP): Performed by: PHYSICIAN ASSISTANT

## 2019-05-14 PROCEDURE — 85027 COMPLETE CBC AUTOMATED: CPT

## 2019-05-14 PROCEDURE — 97530 THERAPEUTIC ACTIVITIES: CPT

## 2019-05-14 PROCEDURE — 2580000003 HC RX 258: Performed by: NURSE PRACTITIONER

## 2019-05-14 PROCEDURE — 1200000000 HC SEMI PRIVATE

## 2019-05-14 PROCEDURE — 80069 RENAL FUNCTION PANEL: CPT

## 2019-05-14 PROCEDURE — 97162 PT EVAL MOD COMPLEX 30 MIN: CPT

## 2019-05-14 PROCEDURE — 2580000003 HC RX 258: Performed by: PHYSICIAN ASSISTANT

## 2019-05-14 RX ORDER — 0.9 % SODIUM CHLORIDE 0.9 %
500 INTRAVENOUS SOLUTION INTRAVENOUS ONCE
Status: COMPLETED | OUTPATIENT
Start: 2019-05-14 | End: 2019-05-14

## 2019-05-14 RX ORDER — SENNA AND DOCUSATE SODIUM 50; 8.6 MG/1; MG/1
2 TABLET, FILM COATED ORAL 2 TIMES DAILY
Status: DISCONTINUED | OUTPATIENT
Start: 2019-05-14 | End: 2019-05-20 | Stop reason: HOSPADM

## 2019-05-14 RX ORDER — METHOCARBAMOL 500 MG/1
1000 TABLET, FILM COATED ORAL 3 TIMES DAILY
Status: DISCONTINUED | OUTPATIENT
Start: 2019-05-14 | End: 2019-05-20 | Stop reason: HOSPADM

## 2019-05-14 RX ORDER — FAMOTIDINE 20 MG/1
20 TABLET, FILM COATED ORAL 2 TIMES DAILY
Status: DISCONTINUED | OUTPATIENT
Start: 2019-05-14 | End: 2019-05-20 | Stop reason: HOSPADM

## 2019-05-14 RX ORDER — SODIUM CHLORIDE 9 MG/ML
INJECTION, SOLUTION INTRAVENOUS CONTINUOUS
Status: DISCONTINUED | OUTPATIENT
Start: 2019-05-14 | End: 2019-05-15

## 2019-05-14 RX ADMIN — Medication 10 ML: at 17:42

## 2019-05-14 RX ADMIN — SODIUM CHLORIDE: 9 INJECTION, SOLUTION INTRAVENOUS at 02:16

## 2019-05-14 RX ADMIN — Medication 10 ML: at 10:35

## 2019-05-14 RX ADMIN — ENOXAPARIN SODIUM 40 MG: 40 INJECTION SUBCUTANEOUS at 10:31

## 2019-05-14 RX ADMIN — METHOCARBAMOL 1000 MG: 100 INJECTION, SOLUTION INTRAMUSCULAR; INTRAVENOUS at 07:05

## 2019-05-14 RX ADMIN — ATORVASTATIN CALCIUM 10 MG: 10 TABLET, FILM COATED ORAL at 10:31

## 2019-05-14 RX ADMIN — FAMOTIDINE 20 MG: 20 TABLET ORAL at 20:29

## 2019-05-14 RX ADMIN — METHOCARBAMOL 1000 MG: 100 INJECTION, SOLUTION INTRAMUSCULAR; INTRAVENOUS at 00:51

## 2019-05-14 RX ADMIN — FAMOTIDINE 20 MG: 20 TABLET ORAL at 11:51

## 2019-05-14 RX ADMIN — SENNOSIDES, DOCUSATE SODIUM 2 TABLET: 50; 8.6 TABLET, FILM COATED ORAL at 20:28

## 2019-05-14 RX ADMIN — METHOCARBAMOL TABLETS 1000 MG: 500 TABLET, COATED ORAL at 20:29

## 2019-05-14 RX ADMIN — OXYCODONE HYDROCHLORIDE 10 MG: 5 TABLET ORAL at 18:02

## 2019-05-14 RX ADMIN — OXYCODONE HYDROCHLORIDE 10 MG: 5 TABLET ORAL at 06:08

## 2019-05-14 RX ADMIN — OXYCODONE HYDROCHLORIDE 10 MG: 5 TABLET ORAL at 02:15

## 2019-05-14 RX ADMIN — LEVOTHYROXINE SODIUM 150 MCG: 150 TABLET ORAL at 07:07

## 2019-05-14 RX ADMIN — HYDROMORPHONE HYDROCHLORIDE 0.5 MG: 1 INJECTION, SOLUTION INTRAMUSCULAR; INTRAVENOUS; SUBCUTANEOUS at 14:04

## 2019-05-14 RX ADMIN — POTASSIUM CHLORIDE 20 MEQ: 20 TABLET, EXTENDED RELEASE ORAL at 10:30

## 2019-05-14 RX ADMIN — ONDANSETRON 4 MG: 2 INJECTION INTRAMUSCULAR; INTRAVENOUS at 22:49

## 2019-05-14 RX ADMIN — COLCHICINE 0.6 MG: 0.6 TABLET, FILM COATED ORAL at 10:30

## 2019-05-14 RX ADMIN — CEFAZOLIN SODIUM 3 G: 1 POWDER, FOR SOLUTION INTRAMUSCULAR; INTRAVENOUS at 20:29

## 2019-05-14 RX ADMIN — CEFAZOLIN SODIUM 3 G: 1 INJECTION, POWDER, FOR SOLUTION INTRAMUSCULAR; INTRAVENOUS at 05:10

## 2019-05-14 RX ADMIN — METHOCARBAMOL TABLETS 1000 MG: 500 TABLET, COATED ORAL at 15:18

## 2019-05-14 RX ADMIN — SODIUM CHLORIDE 500 ML: 9 INJECTION, SOLUTION INTRAVENOUS at 10:32

## 2019-05-14 RX ADMIN — CEFAZOLIN SODIUM 3 G: 1 POWDER, FOR SOLUTION INTRAMUSCULAR; INTRAVENOUS at 14:03

## 2019-05-14 RX ADMIN — OXYCODONE HYDROCHLORIDE 10 MG: 5 TABLET ORAL at 10:31

## 2019-05-14 RX ADMIN — SENNOSIDES, DOCUSATE SODIUM 2 TABLET: 50; 8.6 TABLET, FILM COATED ORAL at 11:51

## 2019-05-14 RX ADMIN — SODIUM CHLORIDE: 9 INJECTION, SOLUTION INTRAVENOUS at 17:42

## 2019-05-14 ASSESSMENT — PAIN SCALES - GENERAL
PAINLEVEL_OUTOF10: 5
PAINLEVEL_OUTOF10: 5
PAINLEVEL_OUTOF10: 7
PAINLEVEL_OUTOF10: 9
PAINLEVEL_OUTOF10: 7

## 2019-05-14 ASSESSMENT — PAIN DESCRIPTION - DESCRIPTORS
DESCRIPTORS: ACHING

## 2019-05-14 ASSESSMENT — PAIN DESCRIPTION - LOCATION
LOCATION: BACK

## 2019-05-14 ASSESSMENT — PAIN DESCRIPTION - PROGRESSION
CLINICAL_PROGRESSION: NOT CHANGED

## 2019-05-14 ASSESSMENT — PAIN DESCRIPTION - PAIN TYPE
TYPE: SURGICAL PAIN

## 2019-05-14 ASSESSMENT — PAIN DESCRIPTION - ONSET
ONSET: ON-GOING
ONSET: ON-GOING

## 2019-05-14 ASSESSMENT — PAIN DESCRIPTION - DIRECTION
RADIATING_TOWARDS: LEFT LEG NUMBNESS
RADIATING_TOWARDS: LEFT LEG NUMBNESS

## 2019-05-14 ASSESSMENT — PAIN DESCRIPTION - FREQUENCY
FREQUENCY: CONTINUOUS

## 2019-05-14 ASSESSMENT — PAIN DESCRIPTION - ORIENTATION
ORIENTATION: LOWER
ORIENTATION: LEFT
ORIENTATION: LEFT
ORIENTATION: LOWER;MID
ORIENTATION: LEFT

## 2019-05-14 ASSESSMENT — PAIN - FUNCTIONAL ASSESSMENT
PAIN_FUNCTIONAL_ASSESSMENT: PREVENTS OR INTERFERES SOME ACTIVE ACTIVITIES AND ADLS
PAIN_FUNCTIONAL_ASSESSMENT: PREVENTS OR INTERFERES SOME ACTIVE ACTIVITIES AND ADLS

## 2019-05-14 NOTE — PROGRESS NOTES
Patient alert and oriented x4, Sx. Dressing CDI, neurochecks WDL; left leg push/pulls weaker than right. Patient has complained of left leg numbness during shift. Patient's blood pressures soft this a.m. when working with physical therapy (blood pressure was 78/55) patient complained of dizziness. Neuro NP's notified and a bolus was ordered and given. Patient's blood pressures came back up into the 110's for duration of shift. Larsen removed at 0600 today, patient bladder scanned around 1400 with only 37ml. Patient then straight cathed at 1621 and 500 ml removed. Fluids still running at 125ml/hr. Fall precautions in place, will continue to monitor.

## 2019-05-14 NOTE — PROGRESS NOTES
Physical Therapy    Facility/Department: [facility] 5T ORTHO/NEURO  Initial Assessment    NAME: Tricia Rodriguez  : 1950  MRN: [id number]    Date of Service: 2019    Discharge Recommendations:Denise Colón scored a  on the AM-PAC short mobility form. Current research shows that an AM-PAC score of 17 or less is typically not associated with a discharge to the patient's home setting. Based on the patients AM-PAC score and their current functional mobility deficits, it is recommended that the patient have 3-5 sessions per week of Physical Therapy at d/c to increase the patients independence. PT Equipment Recommendations  Equipment Needed: Yes(RW)    Assessment   Body structures, Functions, Activity limitations: Decreased functional mobility ; Decreased ADL status; Decreased strength;Decreased balance;Decreased sensation;Decreased endurance; Increased Pain  Assessment: Pt with pain and decreased mobility following back surgery. Pt is not functioning at her normal level and in need of skilled PT services to address these issues. Treatment Diagnosis: weakness, difficulty with gait. Prognosis: Good  Decision Making: Medium Complexity  Patient Education: PT POC, DC recommendations  REQUIRES PT FOLLOW UP: Yes  Activity Tolerance  Activity Tolerance: Treatment limited secondary to medical complications (free text); Patient limited by pain  Activity Tolerance: Low BP       Patient Diagnosis(es): There were no encounter diagnoses. has a past medical history of Arthritis, Back pain, Fibromyalgia, Gout, Hiatal hernia, Hyperlipidemia, Hypertension, Peripheral neuropathy, Spondylolisthesis, lumbar region, and Thyroid disease. has a past surgical history that includes Hysterectomy; Thyroid surgery; Carpal tunnel release; Endoscopy, colon, diagnostic; lumbar fusion (N/A, 2019); and lumbar fusion (N/A, 2019).     Restrictions  Restrictions/Precautions  Restrictions/Precautions: Fall Risk(MFR)  Position Activity Restriction  Spinal Precautions: No Bending, No Lifting, No Twisting  Other position/activity restrictions: L2-3, L3-4 LATERAL LUMBAR INTERBODY FUSION, L4-5 TRANSFORAMINAL LUMBAR INTERBODY FUSION WITH PEDICLE SCREW  Vision/Hearing  Vision: Impaired  Vision Exceptions: Wears glasses for reading  Hearing: Within functional limits     Subjective  General  Chart Reviewed: Yes  Response To Previous Treatment: Patient with no complaints from previous session. Family / Caregiver Present: Yes()  Diagnosis: L spondylosis  Follows Commands: Within Functional Limits  General Comment  Comments: Pt seated in chair upon arrival.   Subjective  Subjective: Pt reporting 7/10 pain, and burning L foot. Agreeable to working with PT. Pain Screening  Patient Currently in Pain: Yes  Pain Assessment  Pain Assessment: 0-10  Pain Level: 7  Pain Type: Surgical pain  Pain Location: Back  Pain Orientation: Lower  Vital Signs  Patient Currently in Pain: Yes       Orientation  Orientation  Overall Orientation Status: Within Functional Limits  Social/Functional History  Social/Functional History  Lives With: Family( and son)  Type of Home: House  Home Layout: Two level, Able to Live on Main level with bedroom/bathroom(bilevel, 5 steps once inside to get to main level. )  Home Access: Level entry  Bathroom Shower/Tub: Tub/Shower unit  [employer]: Standard  Bathroom Accessibility: Accessible  Home Equipment: Cane, 4 wheeled walker, Wheelchair-electric  ADL Assistance: Independent  Homemaking Assistance: Needs assistance(shares with family.  does most cleaning, she does some cooking. )  Homemaking Responsibilities: Yes  Ambulation Assistance: Independent  Transfer Assistance: Independent  Active : Yes  Leisure & Hobbies: [place]din  Additional Comments: Pt reports using walls and furniture to amb inside home and cane and WC outside home. Pt denies falls in past 6 mo.  L ankle sprain several weeks ago. Cognition        Objective     Observation/Palpation  Posture: Fair  Observation: obese, drain     AROM RLE (degrees)  RLE AROM: WFL  AROM LLE (degrees)  LLE AROM : WFL  Strength RLE  Comment: grossly +3/5  Strength LLE  Comment: grossly 3/5, DF -3/5     Sensation  Overall Sensation Status: Impaired(L foot burning and itching.)  Bed mobility  Supine to Sit: Unable to assess  Comment: Pt in chair and remained in chair at end of treatment. Transfers  Sit to Stand: Minimal Assistance  Stand to sit: Contact guard assistance  Comment: VCs for hand placement. Once standing at chair, pt reporting dizziness. After 1 min, dizziness worsening and pt told to return to sitting. BP 63/40 and O2 97%. Nursing called and in to address issue with pt. After several min BP 87/55. Dizziness still present but easing. Ambulation  Ambulation?: No(Pt reports walking in room last night and this AM. Not able to currently due to low BP issues.)     Balance  Posture: Fair(forward head and rounded shoulders. )  Sitting - Static: Good  Sitting - Dynamic: -;Good  Standing - Static: Good;-  Comments: dizzy upon standing. Plan   Plan  Times per week: 5-7x/week  Times per day: Daily  Current Treatment Recommendations: Strengthening, Balance Training, Functional Mobility Training, Transfer Training, Endurance Training, Gait Training, Stair training, Patient/Caregiver Education & Training, Equipment Evaluation, Education, & procurement, Pain Management, Home Exercise Program, Safety Education & Training  Safety Devices  Type of devices:  All fall risk precautions in place, Call light within reach, Chair alarm in place, Gait belt, Patient at risk for falls, Left in chair, Nurse notified  Restraints  Initially in place: No    G-Code       OutComes Score                                                  AM-PAC Score  AM-PAC Inpatient Mobility Raw Score : 11  AM-PAC Inpatient T-Scale Score : 33.86  Mobility Inpatient CMS 0-100% Score: 72.57  Mobility Inpatient CMS G-Code Modifier : CL          Goals  Short term goals  Time Frame for Short term goals: To be met by DC. Short term goal 1: Pt to perform bed mob with CGA. Short term goal 2: Pt to perform log rolling with good technique. Short term goal 3: Pt to peform transfers with SBA. Short term goal 4: Pt to perform amb with AAD and CGA for 30 ft. Short term goal 5: Pt to amb up/down 5 steps with CGA. Long term goals  Time Frame for Long term goals : LTGs=STGs  Patient Goals   Patient goals :  To recover, return home       Therapy Time   Individual Concurrent Group Co-treatment   Time In 0907         Time Out 0945         Minutes 38         Timed Code Treatment Minutes: [address] [facility], [id number]

## 2019-05-14 NOTE — PLAN OF CARE
Patient has complained of left leg pain/numbness and lower back pain 7-8/10 during shift. Pain being managed with PRN pain medication. Will continue to monitor.

## 2019-05-14 NOTE — PROGRESS NOTES
PACU Transfer Note    Vitals:    05/13/19 2000   BP: 129/76   Pulse: 68   Resp: 15   Temp: 97.3 °F (36.3 °C)   SpO2: 98%       In: 1382 [P.O.:182;  I.V.:1200]  Out: 675 [Urine:450; Drains:225]    Pain assessment:  receiving treatment  Pain Level: 6 Appears to sleep when not disturbed    Report given to Receiving unit RN.    5/13/2019 8:08 PM

## 2019-05-14 NOTE — PLAN OF CARE
Patient is a high fall risk and is up with assist x2 with the walker and gait belt. Patient alert and oriented x4, fall precautions in place, bed in lowest position and locked, bed alarm on for safety, call light and belongings with in reach. Will continue to monitor.

## 2019-05-14 NOTE — PROGRESS NOTES
Home Med List is currently In Process. Information is currently being gathered. Sources I have tried are interview with the patient. Pt states she gets her maintenance medications through mail order. She states her  has a list she just made and she will ask him to bring it to the hospital. Primary RN notified. Medication list will need re-assessment.   Kya Whittington RN

## 2019-05-15 PROCEDURE — 6360000002 HC RX W HCPCS: Performed by: PHYSICIAN ASSISTANT

## 2019-05-15 PROCEDURE — 6360000002 HC RX W HCPCS: Performed by: NURSE PRACTITIONER

## 2019-05-15 PROCEDURE — 2580000003 HC RX 258: Performed by: NURSE PRACTITIONER

## 2019-05-15 PROCEDURE — 51702 INSERT TEMP BLADDER CATH: CPT

## 2019-05-15 PROCEDURE — 6370000000 HC RX 637 (ALT 250 FOR IP): Performed by: PHYSICIAN ASSISTANT

## 2019-05-15 PROCEDURE — 97535 SELF CARE MNGMENT TRAINING: CPT

## 2019-05-15 PROCEDURE — 97166 OT EVAL MOD COMPLEX 45 MIN: CPT

## 2019-05-15 PROCEDURE — 97530 THERAPEUTIC ACTIVITIES: CPT

## 2019-05-15 PROCEDURE — 6370000000 HC RX 637 (ALT 250 FOR IP): Performed by: NURSE PRACTITIONER

## 2019-05-15 PROCEDURE — 1200000000 HC SEMI PRIVATE

## 2019-05-15 PROCEDURE — 2580000003 HC RX 258: Performed by: PHYSICIAN ASSISTANT

## 2019-05-15 RX ORDER — CALCIUM CARBONATE 200(500)MG
500 TABLET,CHEWABLE ORAL 3 TIMES DAILY PRN
Status: DISCONTINUED | OUTPATIENT
Start: 2019-05-15 | End: 2019-05-20 | Stop reason: HOSPADM

## 2019-05-15 RX ORDER — TAMSULOSIN HYDROCHLORIDE 0.4 MG/1
0.8 CAPSULE ORAL DAILY
Status: DISCONTINUED | OUTPATIENT
Start: 2019-05-15 | End: 2019-05-20 | Stop reason: HOSPADM

## 2019-05-15 RX ORDER — SIMETHICONE 20 MG/.3ML
40 EMULSION ORAL EVERY 6 HOURS PRN
Status: DISCONTINUED | OUTPATIENT
Start: 2019-05-15 | End: 2019-05-20 | Stop reason: HOSPADM

## 2019-05-15 RX ADMIN — FUROSEMIDE 80 MG: 80 TABLET ORAL at 09:05

## 2019-05-15 RX ADMIN — METOPROLOL TARTRATE 100 MG: 100 TABLET ORAL at 09:04

## 2019-05-15 RX ADMIN — FAMOTIDINE 20 MG: 20 TABLET ORAL at 09:05

## 2019-05-15 RX ADMIN — TAMSULOSIN HYDROCHLORIDE 0.8 MG: 0.4 CAPSULE ORAL at 11:31

## 2019-05-15 RX ADMIN — OXYCODONE HYDROCHLORIDE 5 MG: 5 TABLET ORAL at 07:46

## 2019-05-15 RX ADMIN — OXYCODONE HYDROCHLORIDE 5 MG: 5 TABLET ORAL at 03:53

## 2019-05-15 RX ADMIN — CEFAZOLIN SODIUM 3 G: 1 POWDER, FOR SOLUTION INTRAMUSCULAR; INTRAVENOUS at 13:29

## 2019-05-15 RX ADMIN — CEFAZOLIN SODIUM 3 G: 1 POWDER, FOR SOLUTION INTRAMUSCULAR; INTRAVENOUS at 06:27

## 2019-05-15 RX ADMIN — POTASSIUM CHLORIDE 20 MEQ: 20 TABLET, EXTENDED RELEASE ORAL at 09:04

## 2019-05-15 RX ADMIN — METHOCARBAMOL TABLETS 1000 MG: 500 TABLET, COATED ORAL at 09:05

## 2019-05-15 RX ADMIN — SIMETHICONE 40 MG: 20 SUSPENSION/ DROPS ORAL at 15:11

## 2019-05-15 RX ADMIN — LEVOTHYROXINE SODIUM 150 MCG: 150 TABLET ORAL at 06:10

## 2019-05-15 RX ADMIN — ANTACID TABLETS 500 MG: 500 TABLET, CHEWABLE ORAL at 17:48

## 2019-05-15 RX ADMIN — CEFAZOLIN SODIUM 3 G: 1 POWDER, FOR SOLUTION INTRAMUSCULAR; INTRAVENOUS at 21:42

## 2019-05-15 RX ADMIN — AMLODIPINE BESYLATE 10 MG: 10 TABLET ORAL at 09:04

## 2019-05-15 RX ADMIN — SODIUM CHLORIDE: 9 INJECTION, SOLUTION INTRAVENOUS at 02:52

## 2019-05-15 RX ADMIN — METHOCARBAMOL TABLETS 1000 MG: 500 TABLET, COATED ORAL at 13:29

## 2019-05-15 RX ADMIN — Medication 10 ML: at 21:43

## 2019-05-15 RX ADMIN — Medication 10 ML: at 09:06

## 2019-05-15 RX ADMIN — COLCHICINE 0.6 MG: 0.6 TABLET, FILM COATED ORAL at 09:05

## 2019-05-15 RX ADMIN — METHOCARBAMOL TABLETS 1000 MG: 500 TABLET, COATED ORAL at 21:43

## 2019-05-15 RX ADMIN — FAMOTIDINE 20 MG: 20 TABLET ORAL at 21:43

## 2019-05-15 RX ADMIN — SENNOSIDES, DOCUSATE SODIUM 2 TABLET: 50; 8.6 TABLET, FILM COATED ORAL at 21:43

## 2019-05-15 RX ADMIN — ATORVASTATIN CALCIUM 10 MG: 10 TABLET, FILM COATED ORAL at 09:04

## 2019-05-15 RX ADMIN — SENNOSIDES, DOCUSATE SODIUM 2 TABLET: 50; 8.6 TABLET, FILM COATED ORAL at 09:05

## 2019-05-15 RX ADMIN — ENOXAPARIN SODIUM 40 MG: 40 INJECTION SUBCUTANEOUS at 09:05

## 2019-05-15 ASSESSMENT — PAIN DESCRIPTION - PAIN TYPE
TYPE: SURGICAL PAIN

## 2019-05-15 ASSESSMENT — PAIN DESCRIPTION - ORIENTATION
ORIENTATION: MID

## 2019-05-15 ASSESSMENT — PAIN DESCRIPTION - LOCATION
LOCATION: BACK

## 2019-05-15 ASSESSMENT — PAIN DESCRIPTION - PROGRESSION
CLINICAL_PROGRESSION: NOT CHANGED
CLINICAL_PROGRESSION: NOT CHANGED

## 2019-05-15 ASSESSMENT — PAIN SCALES - GENERAL
PAINLEVEL_OUTOF10: 5
PAINLEVEL_OUTOF10: 4

## 2019-05-15 ASSESSMENT — PAIN DESCRIPTION - DESCRIPTORS
DESCRIPTORS: ACHING

## 2019-05-15 ASSESSMENT — PAIN DESCRIPTION - ONSET
ONSET: ON-GOING
ONSET: ON-GOING

## 2019-05-15 ASSESSMENT — PAIN DESCRIPTION - FREQUENCY
FREQUENCY: CONTINUOUS

## 2019-05-15 NOTE — PROGRESS NOTES
Patient alert and oriented x4, VSS, neurochecks WDL, surgical incision CDI and DIEGO. Incision cleaned during shift with warm soapy water and patted dry. Patient had complaints of gas pains during shift, Simethione drops ordered and given. Patient reported relief. Will continue to monitor.

## 2019-05-15 NOTE — PLAN OF CARE
Patient has had complaints of left leg pain and lower back pain, 5-6/10, during shift. Pain being managed with PRN pain medications. Will continue to monitor.

## 2019-05-15 NOTE — PROGRESS NOTES
Back surgical incision washed with warm soapy water and patted dry per order. Patient tolerated very well. Will continue to monitor.

## 2019-05-15 NOTE — PROGRESS NOTES
Occupational Therapy   Occupational Therapy Initial Assessment and Treatment Note   Date: 5/15/2019   Patient Name: Brooke Turpin  MRN: [id number]     : 1950    Date of Service: 5/15/2019    Discharge Recommendations:  Brooke Turpin scored a 16/24 on the AM-PAC ADL Inpatient form. Current research shows that an AM-PAC score of 17 or less is typically not associated with a discharge to the patient's home setting. Based on the patients AM-PAC score and their current ADL deficits, it is recommended that the patient have 5-7 sessions per week of Occupational Therapy at d/c to increase the patients independence. OT Equipment Recommendations  Equipment Needed: No  Other: defer    Assessment   Performance deficits / Impairments: Decreased ADL status; Decreased functional mobility ; Decreased endurance  Assessment: Pt from home - reports struggling to move well 2/2 BLE weakness. Pt needing Mod /MIn A with functional transfers and Min A with short mobility. Pt needing Max A with LE ADLs. Would benefit from continued inpt OT at d/c. Will follow as inpt. Treatment Diagnosis: impaired ADLs /functional transfers / decreased endurance 2/2 back sx   Prognosis: Fair;Good  Decision Making: Medium Complexity  Patient Education: Role of OT / activity promotion - verb understanding   REQUIRES OT FOLLOW UP: Yes  Activity Tolerance  Activity Tolerance: Patient limited by fatigue;Patient limited by pain  Activity Tolerance: Pt demo poor activity tolerance on feet. Pt needing freq rest breaks     Safety Devices  Safety Devices in place: Yes  Type of devices: Call light within reach; Chair alarm in place;Nurse notified; Left in chair           Patient Diagnosis(es): There were no encounter diagnoses. has a past medical history of Arthritis, Back pain, Fibromyalgia, Gout, Hiatal hernia, Hyperlipidemia, Hypertension, Peripheral neuropathy, Spondylolisthesis, lumbar region, and Thyroid disease.    has a past surgical (degrees)  LUE AROM : Exceptions  LUE General AROM: pt reports recent difficulty with BUE shoulder flexion 0-80/ 90 2/2 RTC issues. elbow / wrist - wfl   Left Hand AROM (degrees)  Left Hand AROM: WFL  RUE AROM (degrees)  RUE AROM : Exceptions  RUE General AROM: pt reports recent difficulty with BUE shoulder flexion 0-80/ 90 2/2 RTC issues. elbow / wrist - wfl   Right Hand AROM (degrees)  Right Hand AROM: WFL  LUE Strength  Gross LUE Strength: WFL(except shoulders  3 to 3+ )  L Hand Grasp: 4/5  L Hand Release: 4/5  RUE Strength  Gross RUE Strength: WFL(except shoulders  3 to 3+)  R Hand Grasp: 4/5  R Hand Release: 4/5     Hand Dominance  Hand Dominance: Right     Plan  This note will serve as a discharge summary if patient is discharged from hospital before next treatment session.     Plan  Times per week: 5-7x  Times per day: Daily  Current Treatment Recommendations: Functional Mobility Training, Equipment Evaluation, Education, & procurement, Patient/Caregiver Education & Training, Self-Care / ADL, Safety Education & Training      AM-PAC Score  AM-[facility] Inpatient Daily Activity Raw Score: 16  AM-PAC Inpatient ADL T-Scale Score : 35.96  ADL Inpatient CMS 0-100% Score: 53.32  ADL Inpatient CMS G-Code Modifier : CK    Goals  Short term goals  Time Frame for Short term goals: at d/c   Short term goal 1: Stance x 5 mins with CGA for ADLs   Short term goal 2: LE dressing with MIn A and AE prn   Short term goal 3: Commode/ RTS transfers with CGA   Short term goal 4: Chair pushups x 5 reps with CGA    Patient Goals   Patient goals : Be independent        Therapy Time   Individual Concurrent Group Co-treatment   Time In 1140         Time Out 1219         Minutes 39            Timed Code Treatment Minutes:  24 mins     Total Treatment Minutes:  39 mins       Shantel Hanson OT

## 2019-05-15 NOTE — PLAN OF CARE
Problem: Pain - Acute:  Goal: Pain level will decrease  Description  Pt requests oral pain medication PRN with relief. 5/14/2019 2231 by Mehreen Loja RN  Outcome: Ongoing       Problem: Falls - Risk of:  Goal: Will remain free from falls  Description  Fall precautions in place. Bed is in lowest position, wheels locked, alarm on, non-skid socks on. Call light and bedside table within reach. Patient calls out appropriately. Patient is up x2 assist with jaron britton. Will continue to assess and monitor.     5/14/2019 2231 by Mehreen Loja RN  Outcome: Ongoing

## 2019-05-15 NOTE — PLAN OF CARE
Patient is a high fall risk and is up with assist x2 with the stedy or stand and pivot to bedside commode. Patient alert and oriented x4, fall precautions in place, bed in lowest position and locked, bed alarm on for safety, call light and belongings with in reach. Will continue to monitor.

## 2019-05-16 LAB
ANION GAP SERPL CALCULATED.3IONS-SCNC: 9 MMOL/L (ref 3–16)
BUN BLDV-MCNC: 11 MG/DL (ref 7–20)
CALCIUM SERPL-MCNC: 8.8 MG/DL (ref 8.3–10.6)
CHLORIDE BLD-SCNC: 104 MMOL/L (ref 99–110)
CO2: 29 MMOL/L (ref 21–32)
CREAT SERPL-MCNC: 0.6 MG/DL (ref 0.6–1.2)
GFR AFRICAN AMERICAN: >60
GFR NON-AFRICAN AMERICAN: >60
GLUCOSE BLD-MCNC: 143 MG/DL (ref 70–99)
HCT VFR BLD CALC: 24.6 % (ref 36–48)
HEMOGLOBIN: 7.8 G/DL (ref 12–16)
MCH RBC QN AUTO: 23.3 PG (ref 26–34)
MCHC RBC AUTO-ENTMCNC: 31.5 G/DL (ref 31–36)
MCV RBC AUTO: 73.8 FL (ref 80–100)
PDW BLD-RTO: 17.8 % (ref 12.4–15.4)
PLATELET # BLD: 208 K/UL (ref 135–450)
PMV BLD AUTO: 8.3 FL (ref 5–10.5)
POTASSIUM SERPL-SCNC: 3 MMOL/L (ref 3.5–5.1)
RBC # BLD: 3.34 M/UL (ref 4–5.2)
SODIUM BLD-SCNC: 142 MMOL/L (ref 136–145)
WBC # BLD: 12.8 K/UL (ref 4–11)

## 2019-05-16 PROCEDURE — 6370000000 HC RX 637 (ALT 250 FOR IP): Performed by: NURSE PRACTITIONER

## 2019-05-16 PROCEDURE — 2580000003 HC RX 258: Performed by: PHYSICIAN ASSISTANT

## 2019-05-16 PROCEDURE — 6360000002 HC RX W HCPCS: Performed by: PHYSICIAN ASSISTANT

## 2019-05-16 PROCEDURE — 85027 COMPLETE CBC AUTOMATED: CPT

## 2019-05-16 PROCEDURE — 6370000000 HC RX 637 (ALT 250 FOR IP): Performed by: PHYSICIAN ASSISTANT

## 2019-05-16 PROCEDURE — 80048 BASIC METABOLIC PNL TOTAL CA: CPT

## 2019-05-16 PROCEDURE — 2580000003 HC RX 258: Performed by: NURSE PRACTITIONER

## 2019-05-16 PROCEDURE — 97116 GAIT TRAINING THERAPY: CPT

## 2019-05-16 PROCEDURE — 36415 COLL VENOUS BLD VENIPUNCTURE: CPT

## 2019-05-16 PROCEDURE — 6360000002 HC RX W HCPCS: Performed by: NURSE PRACTITIONER

## 2019-05-16 PROCEDURE — 97530 THERAPEUTIC ACTIVITIES: CPT

## 2019-05-16 PROCEDURE — 1200000000 HC SEMI PRIVATE

## 2019-05-16 RX ORDER — POTASSIUM CHLORIDE 20 MEQ/1
40 TABLET, EXTENDED RELEASE ORAL PRN
Status: DISCONTINUED | OUTPATIENT
Start: 2019-05-16 | End: 2019-05-20 | Stop reason: HOSPADM

## 2019-05-16 RX ORDER — POTASSIUM CHLORIDE 7.45 MG/ML
10 INJECTION INTRAVENOUS PRN
Status: DISCONTINUED | OUTPATIENT
Start: 2019-05-16 | End: 2019-05-20 | Stop reason: HOSPADM

## 2019-05-16 RX ADMIN — ENOXAPARIN SODIUM 40 MG: 40 INJECTION SUBCUTANEOUS at 09:11

## 2019-05-16 RX ADMIN — FAMOTIDINE 20 MG: 20 TABLET ORAL at 09:09

## 2019-05-16 RX ADMIN — OXYCODONE HYDROCHLORIDE 10 MG: 5 TABLET ORAL at 09:09

## 2019-05-16 RX ADMIN — METHOCARBAMOL TABLETS 1000 MG: 500 TABLET, COATED ORAL at 12:14

## 2019-05-16 RX ADMIN — SENNOSIDES, DOCUSATE SODIUM 2 TABLET: 50; 8.6 TABLET, FILM COATED ORAL at 20:36

## 2019-05-16 RX ADMIN — METOPROLOL TARTRATE 100 MG: 100 TABLET ORAL at 09:09

## 2019-05-16 RX ADMIN — METHOCARBAMOL TABLETS 1000 MG: 500 TABLET, COATED ORAL at 09:09

## 2019-05-16 RX ADMIN — POTASSIUM CHLORIDE 20 MEQ: 20 TABLET, EXTENDED RELEASE ORAL at 09:09

## 2019-05-16 RX ADMIN — Medication 10 ML: at 20:37

## 2019-05-16 RX ADMIN — TAMSULOSIN HYDROCHLORIDE 0.8 MG: 0.4 CAPSULE ORAL at 09:09

## 2019-05-16 RX ADMIN — CEFAZOLIN SODIUM 3 G: 1 POWDER, FOR SOLUTION INTRAMUSCULAR; INTRAVENOUS at 05:56

## 2019-05-16 RX ADMIN — Medication 10 ML: at 09:10

## 2019-05-16 RX ADMIN — OXYCODONE HYDROCHLORIDE 10 MG: 5 TABLET ORAL at 19:23

## 2019-05-16 RX ADMIN — CEFAZOLIN SODIUM 3 G: 1 POWDER, FOR SOLUTION INTRAMUSCULAR; INTRAVENOUS at 12:15

## 2019-05-16 RX ADMIN — COLCHICINE 0.6 MG: 0.6 TABLET, FILM COATED ORAL at 12:14

## 2019-05-16 RX ADMIN — FUROSEMIDE 80 MG: 80 TABLET ORAL at 09:09

## 2019-05-16 RX ADMIN — LEVOTHYROXINE SODIUM 150 MCG: 150 TABLET ORAL at 05:56

## 2019-05-16 RX ADMIN — AMLODIPINE BESYLATE 10 MG: 10 TABLET ORAL at 09:09

## 2019-05-16 RX ADMIN — ATORVASTATIN CALCIUM 10 MG: 10 TABLET, FILM COATED ORAL at 09:09

## 2019-05-16 RX ADMIN — OXYCODONE HYDROCHLORIDE 10 MG: 5 TABLET ORAL at 14:43

## 2019-05-16 RX ADMIN — FAMOTIDINE 20 MG: 20 TABLET ORAL at 20:36

## 2019-05-16 RX ADMIN — METHOCARBAMOL TABLETS 1000 MG: 500 TABLET, COATED ORAL at 20:36

## 2019-05-16 ASSESSMENT — PAIN SCALES - GENERAL
PAINLEVEL_OUTOF10: 0
PAINLEVEL_OUTOF10: 3
PAINLEVEL_OUTOF10: 3
PAINLEVEL_OUTOF10: 7
PAINLEVEL_OUTOF10: 7
PAINLEVEL_OUTOF10: 3
PAINLEVEL_OUTOF10: 7

## 2019-05-16 ASSESSMENT — PAIN DESCRIPTION - LOCATION
LOCATION: BACK

## 2019-05-16 ASSESSMENT — PAIN DESCRIPTION - DESCRIPTORS
DESCRIPTORS: ACHING

## 2019-05-16 ASSESSMENT — PAIN DESCRIPTION - ORIENTATION
ORIENTATION: MID

## 2019-05-16 ASSESSMENT — PAIN DESCRIPTION - PAIN TYPE
TYPE: SURGICAL PAIN
TYPE: ACUTE PAIN;SURGICAL PAIN
TYPE: ACUTE PAIN;SURGICAL PAIN

## 2019-05-16 ASSESSMENT — PAIN DESCRIPTION - PROGRESSION
CLINICAL_PROGRESSION: GRADUALLY WORSENING
CLINICAL_PROGRESSION: NOT CHANGED
CLINICAL_PROGRESSION: GRADUALLY WORSENING

## 2019-05-16 ASSESSMENT — PAIN DESCRIPTION - FREQUENCY
FREQUENCY: CONTINUOUS

## 2019-05-16 ASSESSMENT — PAIN - FUNCTIONAL ASSESSMENT
PAIN_FUNCTIONAL_ASSESSMENT: PREVENTS OR INTERFERES WITH ALL ACTIVE AND SOME PASSIVE ACTIVITIES
PAIN_FUNCTIONAL_ASSESSMENT: PREVENTS OR INTERFERES SOME ACTIVE ACTIVITIES AND ADLS
PAIN_FUNCTIONAL_ASSESSMENT: PREVENTS OR INTERFERES SOME ACTIVE ACTIVITIES AND ADLS

## 2019-05-16 ASSESSMENT — PAIN DESCRIPTION - ONSET
ONSET: GRADUAL
ONSET: ON-GOING
ONSET: PROGRESSIVE

## 2019-05-16 ASSESSMENT — PAIN DESCRIPTION - DIRECTION
RADIATING_TOWARDS: LEG NUMBNESS
RADIATING_TOWARDS: LEG NUMBNESS

## 2019-05-16 NOTE — PROGRESS NOTES
No acute events overnight. Pt has been resting comfortably. Larsen draining adequately, clear yellow urine. Dressing CD&I. Hemovac has had 70 mL of bloody output so far this shift. Pt has not requested pain medication. Pt denies nausea/vomiting.

## 2019-05-16 NOTE — PROGRESS NOTES
Physical Therapy  Facility/Department: [facility] 5T ORTHO/NEURO  Daily Treatment Note  NAME: Michael Kelley  :   MRN: [id number]    Date of Service: 2019    Discharge Recommendations:Denise Alejandro scored a 14/24 on the AM-PAC short mobility form. Current research shows that an AM-PAC score of 17 or less is typically not associated with a discharge to the patient's home setting. Based on the patients AM-PAC score and their current functional mobility deficits, it is recommended that the patient have 5-7 sessions per week of Physical Therapy at d/c to increase the patients independence. PT Equipment Recommendations  Equipment Needed: (Will continue to assess)    Patient Diagnosis(es): There were no encounter diagnoses. has a past medical history of Arthritis, Back pain, Fibromyalgia, Gout, Hiatal hernia, Hyperlipidemia, Hypertension, Peripheral neuropathy, Spondylolisthesis, lumbar region, and Thyroid disease. has a past surgical history that includes Hysterectomy; Thyroid surgery; Carpal tunnel release; Endoscopy, colon, diagnostic; lumbar fusion (N/A, 2019); and lumbar fusion (N/A, 2019). Restrictions  Restrictions/Precautions  Restrictions/Precautions: Fall Risk(MFR)  Position Activity Restriction  Spinal Precautions: No Bending, No Lifting, No Twisting  Other position/activity restrictions: ambulate pt  Subjective   General  Chart Reviewed: Yes  Additional Pertinent Hx: 80 y.o. F admitted  for L2-3, L3-4 XLIF, and L4-5 TLIF. PMHx includes arthritis, back pain, fibromyalgia, HLD, HTN, peripheral neuropathy, hysterectomy, thyroid disease. Family / Caregiver Present: Yes(Grand daughter)  Subjective  Subjective: States that she has been up and walking this morning, but still having difficulty walking  General Comment  Comments: Pt was supine in bed upon arrival. Pt agreeable to PT intervention.   Pain Screening  Patient Currently in Pain: Yes(5/10 in her back)  Vital Signs  Patient Currently in Pain: Yes(5/10 in her back)       Orientation     Cognition      Objective   Bed mobility  Rolling to Left: Minimal assistance(VC and facilitation for positioning in hookline. Use of side rail. Increased time needed to complete)  Supine to Sit: Contact guard assistance(Increased time needed to complete; VC for sequencing to progress from sidelying to upright at the EOB.)  Transfers  Sit to Stand: Minimal Assistance(From bed, recliner and chair; Multiple reps from each surface. Pt given VC for hand placement, pursed lip breathing, and upright posture once in standing.)  Stand to sit: Contact guard assistance(VC for hand placement and safety. )  Bed to Chair: Contact guard assistance(with RW)  Ambulation  Ambulation?: Yes  Ambulation 1  Surface: level tile  Device: Rolling Walker  Assistance: Minimal assistance  Quality of Gait: Step through pattern. Foot drag on the L 2/2 decreased DF in swing. Shuffles R LE forward for advancement. Increased L knee ext for support in stance. No outward LOB ntoed  Distance: 2 x 20'  Comments: VC for increased hip/knee flexion in swing on the L. Pt given VC for increased R foot clearance and advancement. Pt given VC for positioning in the RW. Stairs/Curb  Stairs?: No               Strength LLE  Comment: Ankle DF 1/5. Neurosurgery PA notified of weakened DF. Assessment   Body structures, Functions, Activity limitations: Decreased functional mobility ; Decreased ADL status; Decreased strength;Decreased balance;Decreased sensation;Decreased endurance; Increased Pain  Assessment: Pt with decreased DF strength on the L that she states is new since surgery. PA notified. Pt requriing consisten VC for increased safety during all mobility and limited by pain and endurance at that time. Pt would benefit from continued therapy to increase her independence with functional mobility. Treatment Diagnosis: weakness, difficulty with gait. Prognosis: Good  Patient Education: Educated on improved gait mechanics. REQUIRES PT FOLLOW UP: Yes  Activity Tolerance  Activity Tolerance: Patient limited by pain; Patient limited by fatigue;Patient limited by endurance  Activity Tolerance: Prolonged rest breaks needed 2/2 pain     G-Code     OutComes Score                                                  AM-PAC Score             Goals  Short term goals  Time Frame for Short term goals: To be met by DC. Short term goal 1: Pt to perform bed mob with CGA. --Ongoing  Short term goal 2: Pt to perform log rolling with good technique. --Ongoing  Short term goal 3: Pt to peform transfers with SBA. --Ongoing  Short term goal 4: Pt to perform amb with AAD and CGA for 30 ft. --Ongoing  Short term goal 5: Pt to amb up/down 5 steps with CGA. --Ongoing  Long term goals  Time Frame for Long term goals : LTGs=STGs  Patient Goals   Patient goals : To recover, return home    Plan    Plan  Times per week: 5-7x/week  Times per day: Daily  Current Treatment Recommendations: Strengthening, Balance Training, Functional Mobility Training, Transfer Training, Endurance Training, Gait Training, Stair training, Patient/Caregiver Education & Training, Equipment Evaluation, Education, & procurement, Pain Management, Home Exercise Program, Safety Education & Training  Safety Devices  Type of devices: Call light within reach, Chair alarm in place, Gait belt, Patient at risk for falls, Left in chair, Nurse notified  Restraints  Initially in place: No     Therapy Time   Individual Concurrent Group Co-treatment   Time In 1057         Time Out 1137         Minutes [address], PT    If pt d/c'd prior to next treatment, this note serves as a discharge note.

## 2019-05-16 NOTE — PLAN OF CARE
Problem: Discharge Planning:  Goal: Discharged to appropriate level of care  Description  Discharged to appropriate level of care  Outcome: Ongoing     Problem: Mobility - Impaired:  Goal: Mobility will improve to maximum level  Description  Mobility will improve to maximum level  Outcome: Ongoing     Problem: Pain - Acute:  Goal: Pain level will decrease  Description  Pain level will decrease  5/16/2019 0918 by Yoselin Concepcion RN  Outcome: Ongoing  5/15/2019 2053 by Christofer Green RN  Outcome: Ongoing     Problem: Falls - Risk of:  Goal: Will remain free from falls  Description  Will remain free from falls  5/15/2019 2053 by Christofer Green RN  Outcome: Ongoing     Problem: Pain:  Goal: Pain level will decrease  Description  Pain level will decrease  5/16/2019 0918 by Yoselin Concepcion RN  Outcome: Ongoing  5/15/2019 2053 by Christofer Green RN  Outcome: Ongoing

## 2019-05-16 NOTE — CARE COORDINATION
5/16/2019  [facility])  Clinical Case Management Department    Spoke with patient regarding discharge plans to ARU. I have spoken with Bre Chavez in [place] [id number] and we are still waiting for a decision from Clarke hickman. Patient: Shelia Stinson  MRN: [id number] / Grace Led: 1950  ACCT: [de-identified]          Admission Documentation  Attending Provider: Tiffany Smith MD  Admit date/time: 5/13/2019  5:22 AM  Status: Inpatient [101]  Diagnosis: Spondylolisthesis of lumbar region     Readmission within last 30 days:  no     Living Situation  Discharge Planning  Type of Residence: Private Residence  Living Arrangements: Spouse/Significant Other, Children  Support Systems: Spouse/Significant Other, Children  Potential Assistance Needed: N/A  Type of Home Care Services: None  Patient expects to be discharged to[de-identified] home  Expected Discharge Date: 05/16/19    Service Assessment       Values / Beliefs  Do you have any ethnic, cultural, sacramental, or spiritual [employer]ism needs you would like us to be aware of while you are in the hospital?: No    Advance Directives (For Healthcare)  Pre-existing DNR Comfort Care/DNR Arrest/DNI Order: No  Healthcare Directive: No, patient does not have an advance directive for healthcare treatment  Information on Healthcare Directives Requested: No  Patient Requests Assistance: No  Advance Directives: Pt. not interested at this time                        Destination  acute rehab    [facility]   deferred    Home Health/Skilled Nursing  Services at Discharge: Other ARU  Home care at home?  No Provider: [facility] Provider Phone: [facility]    Therapy Consults  PT evaluation needed?: Yes (Comment)  OT Evalulation Needed?: Yes (Comment)  SLP evaluation needed?: No    Home Medical Care  NA  Pharmacy:[facility]  Potential Assistance Purchasing Medications:  No  Does patient want to participate in local refill/meds to beds program?: Yes    Goals of Care  Patient expects to be discharged to[de-identified] home  Patient plans for [facility]         Mode of transport from hospital: TBD    Factors facilitating achievement of predicted outcomes: Family support, Cooperative and Pleasant    Barriers to discharge: awaiting precert     Yenifer Bui RN  [facility]  Case Management Department  Ph: [phone]: [phone]

## 2019-05-17 LAB
ANION GAP SERPL CALCULATED.3IONS-SCNC: 11 MMOL/L (ref 3–16)
BUN BLDV-MCNC: 13 MG/DL (ref 7–20)
CALCIUM SERPL-MCNC: 8.9 MG/DL (ref 8.3–10.6)
CHLORIDE BLD-SCNC: 104 MMOL/L (ref 99–110)
CO2: 28 MMOL/L (ref 21–32)
CREAT SERPL-MCNC: 0.6 MG/DL (ref 0.6–1.2)
GFR AFRICAN AMERICAN: >60
GFR NON-AFRICAN AMERICAN: >60
GLUCOSE BLD-MCNC: 111 MG/DL (ref 70–99)
HCT VFR BLD CALC: 23.6 % (ref 36–48)
HEMOGLOBIN: 7.5 G/DL (ref 12–16)
MCH RBC QN AUTO: 23.7 PG (ref 26–34)
MCHC RBC AUTO-ENTMCNC: 32 G/DL (ref 31–36)
MCV RBC AUTO: 74 FL (ref 80–100)
PDW BLD-RTO: 17.3 % (ref 12.4–15.4)
PLATELET # BLD: 227 K/UL (ref 135–450)
PMV BLD AUTO: 8 FL (ref 5–10.5)
POTASSIUM SERPL-SCNC: 3.2 MMOL/L (ref 3.5–5.1)
RBC # BLD: 3.18 M/UL (ref 4–5.2)
SODIUM BLD-SCNC: 143 MMOL/L (ref 136–145)
WBC # BLD: 10.4 K/UL (ref 4–11)

## 2019-05-17 PROCEDURE — 85027 COMPLETE CBC AUTOMATED: CPT

## 2019-05-17 PROCEDURE — 6360000002 HC RX W HCPCS: Performed by: NURSE PRACTITIONER

## 2019-05-17 PROCEDURE — 36415 COLL VENOUS BLD VENIPUNCTURE: CPT

## 2019-05-17 PROCEDURE — 97530 THERAPEUTIC ACTIVITIES: CPT

## 2019-05-17 PROCEDURE — 2580000003 HC RX 258: Performed by: NURSE PRACTITIONER

## 2019-05-17 PROCEDURE — 6370000000 HC RX 637 (ALT 250 FOR IP): Performed by: PHYSICIAN ASSISTANT

## 2019-05-17 PROCEDURE — 1200000000 HC SEMI PRIVATE

## 2019-05-17 PROCEDURE — 80048 BASIC METABOLIC PNL TOTAL CA: CPT

## 2019-05-17 PROCEDURE — 97116 GAIT TRAINING THERAPY: CPT

## 2019-05-17 PROCEDURE — 6370000000 HC RX 637 (ALT 250 FOR IP): Performed by: NURSE PRACTITIONER

## 2019-05-17 PROCEDURE — 2580000003 HC RX 258: Performed by: PHYSICIAN ASSISTANT

## 2019-05-17 PROCEDURE — 6360000002 HC RX W HCPCS: Performed by: PHYSICIAN ASSISTANT

## 2019-05-17 RX ADMIN — CEFAZOLIN SODIUM 3 G: 1 POWDER, FOR SOLUTION INTRAMUSCULAR; INTRAVENOUS at 06:22

## 2019-05-17 RX ADMIN — Medication 10 ML: at 20:22

## 2019-05-17 RX ADMIN — FAMOTIDINE 20 MG: 20 TABLET ORAL at 20:22

## 2019-05-17 RX ADMIN — OXYCODONE HYDROCHLORIDE 5 MG: 5 TABLET ORAL at 11:53

## 2019-05-17 RX ADMIN — SENNOSIDES, DOCUSATE SODIUM 2 TABLET: 50; 8.6 TABLET, FILM COATED ORAL at 20:22

## 2019-05-17 RX ADMIN — ENOXAPARIN SODIUM 40 MG: 40 INJECTION SUBCUTANEOUS at 09:23

## 2019-05-17 RX ADMIN — POTASSIUM CHLORIDE 20 MEQ: 20 TABLET, EXTENDED RELEASE ORAL at 09:22

## 2019-05-17 RX ADMIN — ANTACID TABLETS 500 MG: 500 TABLET, CHEWABLE ORAL at 00:42

## 2019-05-17 RX ADMIN — LEVOTHYROXINE SODIUM 150 MCG: 150 TABLET ORAL at 06:22

## 2019-05-17 RX ADMIN — SENNOSIDES, DOCUSATE SODIUM 2 TABLET: 50; 8.6 TABLET, FILM COATED ORAL at 09:23

## 2019-05-17 RX ADMIN — OXYCODONE HYDROCHLORIDE 5 MG: 5 TABLET ORAL at 07:47

## 2019-05-17 RX ADMIN — OXYCODONE HYDROCHLORIDE 10 MG: 5 TABLET ORAL at 20:22

## 2019-05-17 RX ADMIN — TAMSULOSIN HYDROCHLORIDE 0.8 MG: 0.4 CAPSULE ORAL at 09:23

## 2019-05-17 RX ADMIN — METHOCARBAMOL TABLETS 1000 MG: 500 TABLET, COATED ORAL at 09:23

## 2019-05-17 RX ADMIN — CEFAZOLIN SODIUM 3 G: 1 POWDER, FOR SOLUTION INTRAMUSCULAR; INTRAVENOUS at 00:32

## 2019-05-17 RX ADMIN — FAMOTIDINE 20 MG: 20 TABLET ORAL at 09:23

## 2019-05-17 RX ADMIN — ATORVASTATIN CALCIUM 10 MG: 10 TABLET, FILM COATED ORAL at 09:23

## 2019-05-17 RX ADMIN — AMLODIPINE BESYLATE 10 MG: 10 TABLET ORAL at 09:23

## 2019-05-17 RX ADMIN — METOPROLOL TARTRATE 100 MG: 100 TABLET ORAL at 09:23

## 2019-05-17 RX ADMIN — CEFAZOLIN SODIUM 3 G: 1 POWDER, FOR SOLUTION INTRAMUSCULAR; INTRAVENOUS at 15:25

## 2019-05-17 RX ADMIN — METHOCARBAMOL TABLETS 1000 MG: 500 TABLET, COATED ORAL at 15:06

## 2019-05-17 RX ADMIN — FUROSEMIDE 80 MG: 80 TABLET ORAL at 09:23

## 2019-05-17 RX ADMIN — METHOCARBAMOL TABLETS 1000 MG: 500 TABLET, COATED ORAL at 20:22

## 2019-05-17 ASSESSMENT — PAIN SCALES - GENERAL
PAINLEVEL_OUTOF10: 5
PAINLEVEL_OUTOF10: 3
PAINLEVEL_OUTOF10: 0
PAINLEVEL_OUTOF10: 7
PAINLEVEL_OUTOF10: 3
PAINLEVEL_OUTOF10: 1
PAINLEVEL_OUTOF10: 6
PAINLEVEL_OUTOF10: 7

## 2019-05-17 ASSESSMENT — PAIN DESCRIPTION - ONSET
ONSET: GRADUAL
ONSET: GRADUAL
ONSET: ON-GOING

## 2019-05-17 ASSESSMENT — PAIN DESCRIPTION - PAIN TYPE
TYPE: ACUTE PAIN;SURGICAL PAIN
TYPE: SURGICAL PAIN

## 2019-05-17 ASSESSMENT — PAIN DESCRIPTION - PROGRESSION
CLINICAL_PROGRESSION: GRADUALLY WORSENING

## 2019-05-17 ASSESSMENT — PAIN DESCRIPTION - ORIENTATION
ORIENTATION: MID

## 2019-05-17 ASSESSMENT — PAIN DESCRIPTION - FREQUENCY
FREQUENCY: CONTINUOUS

## 2019-05-17 ASSESSMENT — PAIN DESCRIPTION - DIRECTION
RADIATING_TOWARDS: LEFT LEG
RADIATING_TOWARDS: LEFT LEG

## 2019-05-17 ASSESSMENT — PAIN DESCRIPTION - LOCATION
LOCATION: BACK

## 2019-05-17 ASSESSMENT — PAIN DESCRIPTION - DESCRIPTORS
DESCRIPTORS: SHARP;SHOOTING
DESCRIPTORS: ACHING

## 2019-05-17 ASSESSMENT — PAIN - FUNCTIONAL ASSESSMENT
PAIN_FUNCTIONAL_ASSESSMENT: PREVENTS OR INTERFERES SOME ACTIVE ACTIVITIES AND ADLS

## 2019-05-17 NOTE — PROGRESS NOTES
Patient alert and oriented x4. VSS. Minimal complaints of pain, denies need for pain medicine. Neuro checks WNL. Tolerating ambulation well. Continued output from hemovac. Urinating adequately. Fall precautions in place, will continue to monitor.

## 2019-05-17 NOTE — PROGRESS NOTES
Bedside report done by Romy Carey and Juanis Alatorre in bed. A/O x4. Bed alarm on, wheels locked, bed in lowest position, side rails up 2/4, nonskid socks on, call light and bedside table in reach. Will continue to monitor.

## 2019-05-17 NOTE — PROGRESS NOTES
5/13/2019). Restrictions  Restrictions/Precautions  Restrictions/Precautions: Fall Risk(MFR)  Position Activity Restriction  Spinal Precautions: No Bending, No Lifting, No Twisting  Other position/activity restrictions: ambulate pt  Subjective   General  Chart Reviewed: Yes  Additional Pertinent Hx: 80 y.o. F admitted 5/13 for L2-3, L3-4 XLIF, and L4-5 TLIF. PMHx includes arthritis, back pain, fibromyalgia, HLD, HTN, peripheral neuropathy, hysterectomy, thyroid disease. Response to previous treatment: Patient with no complaints from previous session  Family / Caregiver Present: No  Referring Practitioner: SHERYL Oviedo  Diagnosis: spondylolisthesis   Subjective  Subjective: Pt supine in bed upon entry, pleasant agreeable to therapy session, but declined all ADLs. General Comment  Comments: Sock donned at Dependent. Pt supine to sit Min A. Pt sat EOB Supervision. Pt sit to stand CGA and pt ambulated with bariatric rw ~45 ft in room at slow pace. Pt then stand to sit CGA. Call light in reach and chair alarm on. Pain Assessment  Pain Level: 7  Pain Type: Surgical pain  Pain Location: Back  Pain Orientation: Mid  Pain Descriptors: Lorna Giacomo; Shooting  Vital Signs  Patient Currently in Pain: Yes   Orientation  Orientation  Overall Orientation Status: Within Normal Limits  Objective    ADL  LE Dressing: Dependent/Total(socks only)        Balance  Standing Balance: Contact guard assistance  Standing Balance  Time: ~9 min  Activity: ambulation in room  Functional Mobility  Functional - Mobility Device: Rolling Walker  Activity: Other  Assist Level: Contact guard assistance  Functional Mobility Comments: no LOB  Bed mobility  Supine to Sit: Minimal assistance(vc)  Transfers  Sit to stand: Contact guard assistance  Stand to sit: Contact guard assistance                       Cognition  Overall Cognitive Status: [facility]                                         Plan   Plan  Times per week: 5-7x  Times per day: Daily  Current Treatment Recommendations: Functional Mobility Training, Equipment Evaluation, Education, & procurement, Patient/Caregiver Education & Training, Self-Care / ADL, Safety Education & Training  G-Code     OutComes Score                                                  AM-PAC Score        AM-PAC Inpatient Daily Activity Raw Score: 16  AM-PAC Inpatient ADL T-Scale Score : 35.96  ADL Inpatient CMS 0-100% Score: 53.32  ADL Inpatient CMS G-Code Modifier : CK    Goals  Short term goals  Time Frame for Short term goals: at d/c   Short term goal 1: Stance x 5 mins with CGA for ADLs - goal not addressed 5/17  Short term goal 2: LE dressing with MIn A and AE prn - goal not met Dependent for socks 5/17  Short term goal 3: Commode/ RTS transfers with CGA  - goal not addressed 5/17  Short term goal 4: Chair pushups x 5 reps with CGA  - goal not addressed 5/17  Patient Goals   Patient goals : Be independent        Therapy Time   Individual Concurrent Group Co-treatment   Time In 9815         Time Out 1500         Minutes 15         Timed Code Treatment Minutes: [address], [address]

## 2019-05-17 NOTE — PROGRESS NOTES
List    Diagnosis Date Noted    Spondylolisthesis of lumbar region 05/13/2019    Enlarged lymph nodes 05/31/2017    Pulmonary nodules     Mediastinal lymphadenopathy     MARK (obstructive sleep apnea)     CAP (community acquired pneumonia) 02/26/2017    Hypokalemia 02/26/2017       Assessment:  Patient is a 77 y.o. female s/p Procedure(s) (LRB):  L2-3, L3-4 LATERAL LUMBAR INTERBODY FUSION (N/A)  L4-5 TRANSFORAMINAL LUMBAR INTERBODY FUSION WITH PEDICLE SCREW (N/A)    Plan:  1. Neurologically stable  2. Neurologic exams frequency:  Floor: Q4H  3. For change in exam MUST contact neurosurgery team along with critical care or primary team  4. Mobility:  - PT/OT as tolerates  - PMR consulted, appreciate recs  - Left foot drop. Probably related to surgical stretching of the nerve with distraction. Given the lack of severe pain would expect this improve with time. Pt may benefit from AFO   5. Diet: Advance as tolerates  6. Antibiotics: Cefazolin while drain in place  7. Drain: Continue drain until output <30 mL Q8H  8. Urinary retention:  - Larsen DC'd yesterday, and no urinary retention documented since removal  - Continue Flomax  9. DVT Prophylaxis: SCD's & Lovenox in AM  10. GI Prophylaxis: Pepcid  11. Bowel Regimen: Senokot-S  12. Pain control: PRN Percocet and Dilaudid  13. Muscle spasms: Robaxin and PRN Valium  14. Orthostatic Hypotension: Resolved  15. Acute Blood Loss Anemia:  - Hgb 7.5 today  - Daily CBC  - Transfuse if Hgb <7.0    DISPO: Remain inpatient until pre-cert for SNF approved. Patient was discussed with Dr. Katharine Sanchez who agrees with above assessment and plan.      Electronically signed by: SHALA Vallejo-CNP, 5/17/2019 11:28 AM  [phone]

## 2019-05-17 NOTE — PLAN OF CARE
Problem: Pain - Acute:  Goal: Pain level will decrease  Description  Pain level will decrease  Outcome: Met This Shift  Note:   Patient complains of back pain radiating down left leg. Pain controlled with PRN medications. Upon reassessment, patient resting quietly in bed with respirations greater than 14. Will continue to monitor. Problem: Falls - Risk of:  Goal: Will remain free from falls  Description  Will remain free from falls  Outcome: Met This Shift  Note:   Patient will remain free from falls throughout shift. Up x1 with wheeled walker. Tolerating ambulation well. Fall precautions in place. Non-skid socks on. Bed locked in lowest position with alarm on and 2/4 side rails up. Bedside table and call light within reach. Patient calling out appropriately when needing assistance. Hourly rounding in anticipation of patient's needs. Floor clean and free from clutter. Room door open. Will continue to monitor.

## 2019-05-17 NOTE — PROGRESS NOTES
Patient alert and oriented. Vital signs stable. Complaints of pain managed with PRN medication. Denies n/v. Neuro checks WNL. Numbness and tingling to LLE at baseline. Clear breath sounds, active bowel sounds. Tolerating food and liquid. Up x1 with wheeled walker and gait belt. Voiding well; no BM this shift. Hemovac drain emptied with 60 ml drained. Fall precautions in place. Patient calling out appropriately. Will continue to monitor.

## 2019-05-17 NOTE — PLAN OF CARE
Problem: Infection - Surgical Site:  Goal: Will show no infection signs and symptoms  Description  Will show no infection signs and symptoms  5/17/2019 0501 by Elliott Corado RN  Outcome: Ongoing  5/17/2019 0501 by Elliott Corado RN  Outcome: Ongoing   Surgical site is CDI. No redness, drainage, or swelling. Will continue to monitor. Problem: Mobility - Impaired:  Goal: Mobility will improve to maximum level  Description  Mobility will improve to maximum level  5/17/2019 0501 by Elliott Corado RN  Outcome: Ongoing  5/17/2019 0501 by Elliott Corado RN  Outcome: Ongoing   Patient ambulating x1 assist with a walker and gait belt and tolerating well.

## 2019-05-18 LAB
ANION GAP SERPL CALCULATED.3IONS-SCNC: 13 MMOL/L (ref 3–16)
BUN BLDV-MCNC: 11 MG/DL (ref 7–20)
CALCIUM SERPL-MCNC: 8.8 MG/DL (ref 8.3–10.6)
CHLORIDE BLD-SCNC: 103 MMOL/L (ref 99–110)
CO2: 27 MMOL/L (ref 21–32)
CREAT SERPL-MCNC: 0.6 MG/DL (ref 0.6–1.2)
GFR AFRICAN AMERICAN: >60
GFR NON-AFRICAN AMERICAN: >60
GLUCOSE BLD-MCNC: 108 MG/DL (ref 70–99)
HCT VFR BLD CALC: 24.9 % (ref 36–48)
HEMOGLOBIN: 7.9 G/DL (ref 12–16)
MCH RBC QN AUTO: 23.7 PG (ref 26–34)
MCHC RBC AUTO-ENTMCNC: 31.7 G/DL (ref 31–36)
MCV RBC AUTO: 74.8 FL (ref 80–100)
PDW BLD-RTO: 17.9 % (ref 12.4–15.4)
PLATELET # BLD: 252 K/UL (ref 135–450)
PMV BLD AUTO: 8 FL (ref 5–10.5)
POTASSIUM SERPL-SCNC: 3.1 MMOL/L (ref 3.5–5.1)
RBC # BLD: 3.33 M/UL (ref 4–5.2)
SODIUM BLD-SCNC: 143 MMOL/L (ref 136–145)
WBC # BLD: 9.7 K/UL (ref 4–11)

## 2019-05-18 PROCEDURE — 2580000003 HC RX 258: Performed by: PHYSICIAN ASSISTANT

## 2019-05-18 PROCEDURE — 36415 COLL VENOUS BLD VENIPUNCTURE: CPT

## 2019-05-18 PROCEDURE — 6370000000 HC RX 637 (ALT 250 FOR IP): Performed by: NURSE PRACTITIONER

## 2019-05-18 PROCEDURE — 2580000003 HC RX 258

## 2019-05-18 PROCEDURE — 6370000000 HC RX 637 (ALT 250 FOR IP): Performed by: PHYSICIAN ASSISTANT

## 2019-05-18 PROCEDURE — 97116 GAIT TRAINING THERAPY: CPT

## 2019-05-18 PROCEDURE — 85027 COMPLETE CBC AUTOMATED: CPT

## 2019-05-18 PROCEDURE — 6360000002 HC RX W HCPCS: Performed by: NURSE PRACTITIONER

## 2019-05-18 PROCEDURE — 6360000002 HC RX W HCPCS: Performed by: PHYSICIAN ASSISTANT

## 2019-05-18 PROCEDURE — 2580000003 HC RX 258: Performed by: NURSE PRACTITIONER

## 2019-05-18 PROCEDURE — 1200000000 HC SEMI PRIVATE

## 2019-05-18 PROCEDURE — 80048 BASIC METABOLIC PNL TOTAL CA: CPT

## 2019-05-18 PROCEDURE — 6370000000 HC RX 637 (ALT 250 FOR IP): Performed by: NEUROLOGICAL SURGERY

## 2019-05-18 RX ORDER — SODIUM CHLORIDE 9 MG/ML
INJECTION, SOLUTION INTRAVENOUS
Status: COMPLETED
Start: 2019-05-18 | End: 2019-05-18

## 2019-05-18 RX ORDER — ALLOPURINOL 100 MG/1
100 TABLET ORAL DAILY
Status: DISCONTINUED | OUTPATIENT
Start: 2019-05-18 | End: 2019-05-20 | Stop reason: HOSPADM

## 2019-05-18 RX ADMIN — DIAZEPAM 10 MG: 5 TABLET ORAL at 01:24

## 2019-05-18 RX ADMIN — CEFAZOLIN SODIUM 3 G: 1 POWDER, FOR SOLUTION INTRAMUSCULAR; INTRAVENOUS at 01:10

## 2019-05-18 RX ADMIN — OXYCODONE HYDROCHLORIDE 5 MG: 5 TABLET ORAL at 10:48

## 2019-05-18 RX ADMIN — SENNOSIDES, DOCUSATE SODIUM 2 TABLET: 50; 8.6 TABLET, FILM COATED ORAL at 08:43

## 2019-05-18 RX ADMIN — METHOCARBAMOL TABLETS 1000 MG: 500 TABLET, COATED ORAL at 19:43

## 2019-05-18 RX ADMIN — ALLOPURINOL 100 MG: 100 TABLET ORAL at 12:03

## 2019-05-18 RX ADMIN — AMLODIPINE BESYLATE 10 MG: 10 TABLET ORAL at 08:43

## 2019-05-18 RX ADMIN — Medication 10 ML: at 08:44

## 2019-05-18 RX ADMIN — METHOCARBAMOL TABLETS 1000 MG: 500 TABLET, COATED ORAL at 08:43

## 2019-05-18 RX ADMIN — CEFAZOLIN SODIUM 3 G: 1 POWDER, FOR SOLUTION INTRAMUSCULAR; INTRAVENOUS at 16:54

## 2019-05-18 RX ADMIN — METHOCARBAMOL TABLETS 1000 MG: 500 TABLET, COATED ORAL at 13:53

## 2019-05-18 RX ADMIN — POTASSIUM CHLORIDE 20 MEQ: 20 TABLET, EXTENDED RELEASE ORAL at 08:43

## 2019-05-18 RX ADMIN — FAMOTIDINE 20 MG: 20 TABLET ORAL at 19:43

## 2019-05-18 RX ADMIN — ENOXAPARIN SODIUM 40 MG: 40 INJECTION SUBCUTANEOUS at 08:43

## 2019-05-18 RX ADMIN — OXYCODONE HYDROCHLORIDE 10 MG: 5 TABLET ORAL at 01:24

## 2019-05-18 RX ADMIN — SENNOSIDES, DOCUSATE SODIUM 2 TABLET: 50; 8.6 TABLET, FILM COATED ORAL at 19:43

## 2019-05-18 RX ADMIN — Medication 10 ML: at 19:44

## 2019-05-18 RX ADMIN — LEVOTHYROXINE SODIUM 150 MCG: 150 TABLET ORAL at 06:25

## 2019-05-18 RX ADMIN — OXYCODONE HYDROCHLORIDE 5 MG: 5 TABLET ORAL at 19:41

## 2019-05-18 RX ADMIN — CEFAZOLIN SODIUM 3 G: 1 POWDER, FOR SOLUTION INTRAMUSCULAR; INTRAVENOUS at 08:45

## 2019-05-18 RX ADMIN — DIAZEPAM 10 MG: 5 TABLET ORAL at 21:36

## 2019-05-18 RX ADMIN — TAMSULOSIN HYDROCHLORIDE 0.8 MG: 0.4 CAPSULE ORAL at 08:44

## 2019-05-18 RX ADMIN — FAMOTIDINE 20 MG: 20 TABLET ORAL at 08:44

## 2019-05-18 RX ADMIN — ATORVASTATIN CALCIUM 10 MG: 10 TABLET, FILM COATED ORAL at 08:43

## 2019-05-18 RX ADMIN — SODIUM CHLORIDE 250 ML: 9 INJECTION, SOLUTION INTRAVENOUS at 01:10

## 2019-05-18 RX ADMIN — DIAZEPAM 10 MG: 5 TABLET ORAL at 10:48

## 2019-05-18 RX ADMIN — FUROSEMIDE 80 MG: 80 TABLET ORAL at 08:44

## 2019-05-18 RX ADMIN — METOPROLOL TARTRATE 100 MG: 100 TABLET ORAL at 08:43

## 2019-05-18 ASSESSMENT — PAIN DESCRIPTION - DESCRIPTORS
DESCRIPTORS: ACHING;SHARP;SHOOTING
DESCRIPTORS: ACHING;CONSTANT
DESCRIPTORS: ACHING
DESCRIPTORS: ACHING

## 2019-05-18 ASSESSMENT — PAIN DESCRIPTION - ORIENTATION
ORIENTATION: MID
ORIENTATION: LOWER;MID
ORIENTATION: LOWER
ORIENTATION: LOWER

## 2019-05-18 ASSESSMENT — PAIN DESCRIPTION - PAIN TYPE
TYPE: SURGICAL PAIN
TYPE: ACUTE PAIN;SURGICAL PAIN
TYPE: SURGICAL PAIN
TYPE: ACUTE PAIN;SURGICAL PAIN

## 2019-05-18 ASSESSMENT — PAIN DESCRIPTION - FREQUENCY
FREQUENCY: CONTINUOUS

## 2019-05-18 ASSESSMENT — PAIN SCALES - GENERAL
PAINLEVEL_OUTOF10: 5
PAINLEVEL_OUTOF10: 6
PAINLEVEL_OUTOF10: 0
PAINLEVEL_OUTOF10: 0
PAINLEVEL_OUTOF10: 5
PAINLEVEL_OUTOF10: 3
PAINLEVEL_OUTOF10: 6
PAINLEVEL_OUTOF10: 0
PAINLEVEL_OUTOF10: 7

## 2019-05-18 ASSESSMENT — PAIN DESCRIPTION - PROGRESSION
CLINICAL_PROGRESSION: NOT CHANGED
CLINICAL_PROGRESSION: GRADUALLY WORSENING

## 2019-05-18 ASSESSMENT — PAIN DESCRIPTION - LOCATION
LOCATION: BACK

## 2019-05-18 ASSESSMENT — PAIN DESCRIPTION - ONSET
ONSET: ON-GOING
ONSET: ON-GOING

## 2019-05-18 NOTE — PROGRESS NOTES
Patient refused Colchine. Stating that she normally takes allopurinol daily at home for gout on a routine basis and not cholchine because it's harmful to her kidneys. Dr. Gillespie Sole and made aware. Order requested for allopurinol to be ordered routinely. Patient resting in bed. Fall precautions taken. Call light within reach.  Thom Gong

## 2019-05-18 NOTE — PROGRESS NOTES
Physical Therapy  Facility/Department: [place] [address] 5T ORTHO/NEURO  Daily Treatment Note  NAME: Dorene Tijerina  :   MRN: [id number]    Date of Service: 2019    Discharge Recommendations: Dorene Tijerina scored a 17/24 on the AM-PAC short mobility form. Current research shows that an AM-PAC score of 17 or less is typically not associated with a discharge to the patient's home setting. Based on the patients AM-PAC score and their current functional mobility deficits, it is recommended that the patient have 5-7 sessions per week of Physical Therapy at d/c to increase the patients independence. PT Equipment Recommendations  Equipment Needed: (defer)    Patient Diagnosis(es): There were no encounter diagnoses. has a past medical history of Arthritis, Back pain, Fibromyalgia, Gout, Hiatal hernia, Hyperlipidemia, Hypertension, Peripheral neuropathy, Spondylolisthesis, lumbar region, and Thyroid disease. has a past surgical history that includes Hysterectomy; Thyroid surgery; Carpal tunnel release; Endoscopy, colon, diagnostic; lumbar fusion (N/A, 2019); and lumbar fusion (N/A, 2019). Restrictions  Restrictions/Precautions  Restrictions/Precautions: Fall Risk(MFR)  Position Activity Restriction  Spinal Precautions: No Bending, No Lifting, No Twisting  Other position/activity restrictions: ambulate pt  Subjective   General  Chart Reviewed: Yes  Additional Pertinent Hx: 80 y.o. F admitted  for L2-3, L3-4 XLIF, and L4-5 TLIF. PMHx includes arthritis, back pain, fibromyalgia, HLD, HTN, peripheral neuropathy, hysterectomy, thyroid disease. Family / Caregiver Present: No  Subjective  Subjective: Reports uncomfortable in bed & wants to get up to eat. \"I'll walk just a little. \"  Pain Screening  Patient Currently in Pain: Yes(surgical pain, unrated, RN aware)  Vital Signs  Patient Currently in Pain: Yes(surgical pain, unrated, RN aware)       Orientation  Orientation  Overall Orientation Status: Within Functional Limits  Cognition      Objective   Bed mobility  Supine to Sit: Minimal assistance(via logroll)  Transfers  Sit to Stand: Minimal Assistance  Stand to sit: Contact guard assistance(cues to reach back safely)  Ambulation  Ambulation?: Yes  Ambulation 1  Surface: level tile  Device: Rolling Walker  Assistance: Minimal assistance  Quality of Gait: L foot drop evident, shuffles R foot forward for advancement; slightly unsteady  Distance: 20'        Exercises  Comments: Passive L HC stretch performed few reps. Assessment   Body structures, Functions, Activity limitations: Decreased functional mobility ; Decreased ADL status; Decreased strength;Decreased balance;Decreased sensation;Decreased endurance; Increased Pain  Assessment: Slow, effortful mobility. L foot drop evident with ambulation. Not safe to walk on her own & will require continued therapy. Treatment Diagnosis: weakness, difficulty with gait. Prognosis: Good  Patient Education: safety with mobility- verbalized understanding  REQUIRES PT FOLLOW UP: Yes  Activity Tolerance  Activity Tolerance: Patient limited by pain; Patient limited by fatigue;Patient limited by endurance     G-Code     OutComes Score                                                  AM-PAC Score  AM-PAC Inpatient Mobility Raw Score : 17  AM-PAC Inpatient T-Scale Score : 42.13  Mobility Inpatient CMS 0-100% Score: 50.57  Mobility Inpatient CMS G-Code Modifier : CK          Goals  Short term goals  Time Frame for Short term goals: To be met by DC. Short term goal 1: Pt to perform bed mob with CGA. --Ongoing  Short term goal 2: Pt to perform log rolling with good technique. --Ongoing  Short term goal 3: Pt to peform transfers with SBA. --Ongoing  Short term goal 4: Pt to perform amb with AAD and CGA for 30 ft. --Ongoing  Short term goal 5: Pt to amb up/down 5 steps with CGA.  --Ongoing  Long term goals  Time Frame for Long term goals : LTGs=STGs  Patient Goals   Patient goals : To recover, return home    Plan    Plan  Times per week: 5-7x/week  Times per day: Daily  Current Treatment Recommendations: Strengthening, Balance Training, Functional Mobility Training, Transfer Training, Endurance Training, Gait Training, Stair training, Patient/Caregiver Education & Training, Equipment Evaluation, Education, & procurement, Pain Management, Home Exercise Program, Safety Education & Training  Safety Devices  Type of devices: Call light within reach, Chair alarm in place, Patient at risk for falls, Left in chair, Nurse notified  Restraints  Initially in place: No     Therapy Time   Individual Concurrent Group Co-treatment   Time In 1154         Time Out 1213         Minutes 19           Timed Code Treatment Minutes:  19 Minutes    Total Treatment Minutes:  [address], [address] [facility] [place]  If patient is discharged prior to next treatment, this note will serve as the discharge summary.

## 2019-05-18 NOTE — PLAN OF CARE
Problem: Mobility - Impaired:  Goal: Mobility will improve to maximum level  Description  Mobility will improve to maximum level  Outcome: Ongoing   Patient tolerating ambulation fairly well with walker. Will continue to monitor. Problem: Pain - Acute:  Goal: Pain level will decrease  Description  Pain level will decrease  5/17/2019 2313 by Mercedez El RN  Outcome: Ongoing  Pain controlled with oral medication. Will continue to monitor. Problem: Sensory Perception - Impaired:  Goal: Sensory function intact, lower extremity  Description  Sensory function intact, lower extremity  Outcome: Ongoing   Numbness remains in LLE. Will continue to monitor. Problem: Falls - Risk of:  Goal: Will remain free from falls  Description  Will remain free from falls  5/17/2019 2313 by Mercedez El RN  Outcome: Ongoing  Calls out appropriately. Bed locked in lowest position. Bed alarm on. Call light/belongings within reach. Will continue to monitor.

## 2019-05-18 NOTE — PROGRESS NOTES
Patient a/o x4. Vitals stable. Pain managed with oral medication. Tolerating ambulation well with walker. hemovac in place with 80mL out this shift. Incision c/d/i DIEGO. Will continue to monitor.

## 2019-05-19 LAB
ANION GAP SERPL CALCULATED.3IONS-SCNC: 14 MMOL/L (ref 3–16)
BUN BLDV-MCNC: 11 MG/DL (ref 7–20)
CALCIUM SERPL-MCNC: 9 MG/DL (ref 8.3–10.6)
CHLORIDE BLD-SCNC: 101 MMOL/L (ref 99–110)
CO2: 29 MMOL/L (ref 21–32)
CREAT SERPL-MCNC: 0.6 MG/DL (ref 0.6–1.2)
GFR AFRICAN AMERICAN: >60
GFR NON-AFRICAN AMERICAN: >60
GLUCOSE BLD-MCNC: 145 MG/DL (ref 70–99)
HCT VFR BLD CALC: 24.4 % (ref 36–48)
HEMOGLOBIN: 7.7 G/DL (ref 12–16)
MCH RBC QN AUTO: 23.5 PG (ref 26–34)
MCHC RBC AUTO-ENTMCNC: 31.6 G/DL (ref 31–36)
MCV RBC AUTO: 74.3 FL (ref 80–100)
PDW BLD-RTO: 17.8 % (ref 12.4–15.4)
PLATELET # BLD: 266 K/UL (ref 135–450)
PMV BLD AUTO: 7.8 FL (ref 5–10.5)
POTASSIUM SERPL-SCNC: 3 MMOL/L (ref 3.5–5.1)
RBC # BLD: 3.29 M/UL (ref 4–5.2)
SODIUM BLD-SCNC: 144 MMOL/L (ref 136–145)
WBC # BLD: 10.1 K/UL (ref 4–11)

## 2019-05-19 PROCEDURE — 80048 BASIC METABOLIC PNL TOTAL CA: CPT

## 2019-05-19 PROCEDURE — 1200000000 HC SEMI PRIVATE

## 2019-05-19 PROCEDURE — 6360000002 HC RX W HCPCS: Performed by: PHYSICIAN ASSISTANT

## 2019-05-19 PROCEDURE — 2580000003 HC RX 258: Performed by: NURSE PRACTITIONER

## 2019-05-19 PROCEDURE — 97530 THERAPEUTIC ACTIVITIES: CPT

## 2019-05-19 PROCEDURE — 2580000003 HC RX 258: Performed by: PHYSICIAN ASSISTANT

## 2019-05-19 PROCEDURE — 6370000000 HC RX 637 (ALT 250 FOR IP): Performed by: PHYSICIAN ASSISTANT

## 2019-05-19 PROCEDURE — 6360000002 HC RX W HCPCS: Performed by: NURSE PRACTITIONER

## 2019-05-19 PROCEDURE — 6370000000 HC RX 637 (ALT 250 FOR IP): Performed by: NEUROLOGICAL SURGERY

## 2019-05-19 PROCEDURE — 97535 SELF CARE MNGMENT TRAINING: CPT

## 2019-05-19 PROCEDURE — 36415 COLL VENOUS BLD VENIPUNCTURE: CPT

## 2019-05-19 PROCEDURE — 85027 COMPLETE CBC AUTOMATED: CPT

## 2019-05-19 PROCEDURE — 6370000000 HC RX 637 (ALT 250 FOR IP): Performed by: NURSE PRACTITIONER

## 2019-05-19 PROCEDURE — 97116 GAIT TRAINING THERAPY: CPT

## 2019-05-19 RX ADMIN — DIAZEPAM 10 MG: 5 TABLET ORAL at 11:20

## 2019-05-19 RX ADMIN — ALLOPURINOL 100 MG: 100 TABLET ORAL at 08:18

## 2019-05-19 RX ADMIN — CEFAZOLIN SODIUM 3 G: 1 POWDER, FOR SOLUTION INTRAMUSCULAR; INTRAVENOUS at 00:47

## 2019-05-19 RX ADMIN — POTASSIUM CHLORIDE 20 MEQ: 20 TABLET, EXTENDED RELEASE ORAL at 08:19

## 2019-05-19 RX ADMIN — Medication 10 ML: at 08:19

## 2019-05-19 RX ADMIN — OXYCODONE HYDROCHLORIDE 10 MG: 5 TABLET ORAL at 02:08

## 2019-05-19 RX ADMIN — ENOXAPARIN SODIUM 40 MG: 40 INJECTION SUBCUTANEOUS at 08:19

## 2019-05-19 RX ADMIN — SENNOSIDES, DOCUSATE SODIUM 2 TABLET: 50; 8.6 TABLET, FILM COATED ORAL at 21:15

## 2019-05-19 RX ADMIN — METHOCARBAMOL TABLETS 1000 MG: 500 TABLET, COATED ORAL at 13:11

## 2019-05-19 RX ADMIN — FAMOTIDINE 20 MG: 20 TABLET ORAL at 08:18

## 2019-05-19 RX ADMIN — METHOCARBAMOL TABLETS 1000 MG: 500 TABLET, COATED ORAL at 21:15

## 2019-05-19 RX ADMIN — TAMSULOSIN HYDROCHLORIDE 0.8 MG: 0.4 CAPSULE ORAL at 08:19

## 2019-05-19 RX ADMIN — FAMOTIDINE 20 MG: 20 TABLET ORAL at 21:15

## 2019-05-19 RX ADMIN — CEFAZOLIN SODIUM 3 G: 1 POWDER, FOR SOLUTION INTRAMUSCULAR; INTRAVENOUS at 16:40

## 2019-05-19 RX ADMIN — OXYCODONE HYDROCHLORIDE 10 MG: 5 TABLET ORAL at 06:07

## 2019-05-19 RX ADMIN — Medication 10 ML: at 21:14

## 2019-05-19 RX ADMIN — SENNOSIDES, DOCUSATE SODIUM 2 TABLET: 50; 8.6 TABLET, FILM COATED ORAL at 08:18

## 2019-05-19 RX ADMIN — METHOCARBAMOL TABLETS 1000 MG: 500 TABLET, COATED ORAL at 08:18

## 2019-05-19 RX ADMIN — DIAZEPAM 10 MG: 5 TABLET ORAL at 21:14

## 2019-05-19 RX ADMIN — OXYCODONE HYDROCHLORIDE 5 MG: 5 TABLET ORAL at 11:20

## 2019-05-19 RX ADMIN — FUROSEMIDE 80 MG: 80 TABLET ORAL at 08:18

## 2019-05-19 RX ADMIN — METOPROLOL TARTRATE 100 MG: 100 TABLET ORAL at 08:18

## 2019-05-19 RX ADMIN — AMLODIPINE BESYLATE 10 MG: 10 TABLET ORAL at 08:18

## 2019-05-19 RX ADMIN — OXYCODONE HYDROCHLORIDE 10 MG: 5 TABLET ORAL at 21:14

## 2019-05-19 RX ADMIN — CEFAZOLIN SODIUM 3 G: 1 POWDER, FOR SOLUTION INTRAMUSCULAR; INTRAVENOUS at 23:59

## 2019-05-19 RX ADMIN — CEFAZOLIN SODIUM 3 G: 1 POWDER, FOR SOLUTION INTRAMUSCULAR; INTRAVENOUS at 08:19

## 2019-05-19 RX ADMIN — LEVOTHYROXINE SODIUM 150 MCG: 150 TABLET ORAL at 06:07

## 2019-05-19 RX ADMIN — ATORVASTATIN CALCIUM 10 MG: 10 TABLET, FILM COATED ORAL at 08:18

## 2019-05-19 ASSESSMENT — PAIN DESCRIPTION - ORIENTATION
ORIENTATION: MID;INNER
ORIENTATION: MID;LOWER
ORIENTATION: MID;LOWER
ORIENTATION: LOWER;MID
ORIENTATION: LOWER;MID

## 2019-05-19 ASSESSMENT — PAIN DESCRIPTION - LOCATION
LOCATION: BACK

## 2019-05-19 ASSESSMENT — PAIN DESCRIPTION - FREQUENCY
FREQUENCY: CONTINUOUS

## 2019-05-19 ASSESSMENT — PAIN DESCRIPTION - DESCRIPTORS
DESCRIPTORS: ACHING
DESCRIPTORS: ACHING;CONSTANT
DESCRIPTORS: ACHING;CONSTANT
DESCRIPTORS: ACHING
DESCRIPTORS: ACHING

## 2019-05-19 ASSESSMENT — PAIN - FUNCTIONAL ASSESSMENT
PAIN_FUNCTIONAL_ASSESSMENT: PREVENTS OR INTERFERES SOME ACTIVE ACTIVITIES AND ADLS

## 2019-05-19 ASSESSMENT — PAIN SCALES - GENERAL
PAINLEVEL_OUTOF10: 6
PAINLEVEL_OUTOF10: 7
PAINLEVEL_OUTOF10: 7
PAINLEVEL_OUTOF10: 6
PAINLEVEL_OUTOF10: 0
PAINLEVEL_OUTOF10: 4
PAINLEVEL_OUTOF10: 7
PAINLEVEL_OUTOF10: 5

## 2019-05-19 ASSESSMENT — PAIN DESCRIPTION - DIRECTION: RADIATING_TOWARDS: L LEG

## 2019-05-19 ASSESSMENT — PAIN DESCRIPTION - ONSET
ONSET: ON-GOING

## 2019-05-19 ASSESSMENT — PAIN DESCRIPTION - PAIN TYPE
TYPE: SURGICAL PAIN
TYPE: ACUTE PAIN;SURGICAL PAIN
TYPE: SURGICAL PAIN
TYPE: SURGICAL PAIN
TYPE: ACUTE PAIN;SURGICAL PAIN

## 2019-05-19 ASSESSMENT — PAIN DESCRIPTION - PROGRESSION
CLINICAL_PROGRESSION: NOT CHANGED

## 2019-05-19 NOTE — PLAN OF CARE
Problem: Mobility - Impaired:  Goal: Mobility will improve to maximum level  Description  Mobility will improve to maximum level  5/18/2019 2057 by Carlton Nguyen RN  Outcome: Ongoing   Tolerating ambulation well with walker. Mobility improving. Will continue to monitor. Problem: Pain - Acute:  Goal: Pain level will decrease  Description  Pain level will decrease  5/18/2019 2057 by Carlton Nguyen RN  Outcome: Ongoing   Pain treated with oral medication and repositioning. Will continue to monitor. Problem: Falls - Risk of:  Goal: Will remain free from falls  Description  Will remain free from falls  Outcome: Ongoing   Calls out appropriately. Bed locked in lowest position. Non-skid socks on when OOB. Call light/belongings within reach. Will continue to monitor.

## 2019-05-19 NOTE — PROGRESS NOTES
Physical Therapy  Facility/Department: [facility] 5T ORTHO/NEURO  Daily Treatment Note  NAME: Hua Christianson  : 1950  MRN: [id number]    Date of Service: 2019    Discharge Recommendations:Denise Miguel scored a 16/24 on the AM-PAC short mobility form. Current research shows that an AM-PAC score of 17 or less is typically not associated with a discharge to the patient's home setting. Based on the patients AM-PAC score and their current functional mobility deficits, it is recommended that the patient have 3-5 sessions per week of Physical Therapy at d/c to increase the patients independence. PT Equipment Recommendations  Equipment Needed: (defer)    Patient Diagnosis(es): There were no encounter diagnoses. has a past medical history of Arthritis, Back pain, Fibromyalgia, Gout, Hiatal hernia, Hyperlipidemia, Hypertension, Peripheral neuropathy, Spondylolisthesis, lumbar region, and Thyroid disease. has a past surgical history that includes Hysterectomy; Thyroid surgery; Carpal tunnel release; Endoscopy, colon, diagnostic; lumbar fusion (N/A, 2019); and lumbar fusion (N/A, 2019). Restrictions  Restrictions/Precautions  Restrictions/Precautions: Fall Risk  Position Activity Restriction  Spinal Precautions: No Bending, No Lifting, No Twisting  Other position/activity restrictions: ambulate pt    Subjective   General  Chart Reviewed: Yes  Additional Pertinent Hx: 80 y.o. F admitted  for L2-3, L3-4 XLIF, and L4-5 TLIF. PMHx includes arthritis, back pain, fibromyalgia, HLD, HTN, peripheral neuropathy, hysterectomy, thyroid disease. Family / Caregiver Present: No  Subjective  Subjective: Pt found supine in bed and agreeable to PT. \" I get tired easy.  \"          Orientation  Orientation  Overall Orientation Status: Within Functional Limits       Objective   Bed mobility  Supine to Sit: Moderate assistance(HOB up and use of rail; slow and effortful)     Transfers  Stand to sit: Contact guard assistance(x3 trials with vc for safety)  Bed to Chair: Contact guard assistance(x3 trials with vc for hand placement)     Ambulation 1  Device: Rolling Walker(bariatric)  Assistance: Minimal assistance;Contact guard assistance(min/CGA)  Quality of Gait: Pt demo slow gait with L foot drop and vc for increased hip/knee flexion to clear L foot safely; vc for pt to stay up in walker  Distance: 15 ft, 25 ft     Assessment   Body structures, Functions, Activity limitations: Decreased functional mobility ; Decreased ADL status; Decreased strength;Decreased balance;Decreased sensation;Decreased endurance; Increased Pain  Assessment: Pt demo slow progress with L foot drop which pt reports MD is not concerned about. Pt plans to pursue SNF placement at discharge. Safety concerns for pt to return home. Pt would benefit from continued skilled IP PT at discharge. Treatment Diagnosis: weakness, difficulty with gait due to back surgery  Patient Education: Pt educated on PT role, need to call for assist to get up and she verbalized understanding. REQUIRES PT FOLLOW UP: Yes  Activity Tolerance  Activity Tolerance: Patient limited by pain; Patient limited by fatigue;Patient limited by endurance  Activity Tolerance: Prolonged rest breaks needed 2/2 pain            AM-PAC Score  AM-PAC Inpatient Mobility Raw Score : 16  AM-PAC Inpatient T-Scale Score : 40.78  Mobility Inpatient CMS 0-100% Score: 54.16  Mobility Inpatient CMS G-Code Modifier : CK          Goals  Short term goals  Time Frame for Short term goals: To be met by DC. Short term goal 1: Pt to perform bed mob with CGA. --Ongoing  Short term goal 2: Pt to perform log rolling with good technique. --Ongoing  Short term goal 3: Pt to peform transfers with SBA. --Ongoing  Short term goal 4: Pt to perform amb with AAD and CGA for 30 ft. --Ongoing  Short term goal 5: Pt to amb up/down 5 steps with CGA. --Ongoing    Patient Goals   Patient goals :  To recover, return home    Plan    Plan  Times per week: 5-7  Times per day: Daily  Current Treatment Recommendations: Strengthening, Balance Training, Functional Mobility Training, Transfer Training, Endurance Training, Gait Training, Stair training, Patient/Caregiver Education & Training, Equipment Evaluation, Education, & procurement, Pain Management, Home Exercise Program, Safety Education & Training  Safety Devices  Type of devices: Call light within reach, Chair alarm in place, Patient at risk for falls, Left in chair, Nurse notified(pt reclined)  Restraints  Initially in place: No     Therapy Time   Individual Concurrent Group Co-treatment   Time In 1315         Time Out 1345         Minutes 30              Timed Code Treatment Minutes:  30    Total Treatment Minutes:  30    This note will serve as a discharge summary if patient is discharged from hospital before next treatment session.     Edith Yap, PT

## 2019-05-19 NOTE — PROGRESS NOTES
release; Endoscopy, colon, diagnostic; lumbar fusion (N/A, 5/13/2019); and lumbar fusion (N/A, 5/13/2019). Restrictions  Restrictions/Precautions  Restrictions/Precautions: Fall Risk(MFR)  Position Activity Restriction  Spinal Precautions: No Bending, No Lifting, No Twisting  Other position/activity restrictions: ambulate pt  Subjective   General  Chart Reviewed: Yes  Additional Pertinent Hx: 80 y.o. F admitted 5/13 for L2-3, L3-4 XLIF, and L4-5 TLIF. PMHx includes arthritis, back pain, fibromyalgia, HLD, HTN, peripheral neuropathy, hysterectomy, thyroid disease. Response to previous treatment: Patient with no complaints from previous session  Family / Caregiver Present: No  Referring Practitioner: SHERYL Olivares  Diagnosis: spondylolisthesis   Subjective  Subjective: \" I feel like that foot is numb\" pt in bed agreeable for OOB /OT tx. Pt reports hopeful for d/c to SNF monday  General Comment  Comments: Sara January Vital Signs  Patient Currently in Pain: Yes(Low back pain - not rated / RN aware )     Orientation  Orientation  Overall Orientation Status: Within Functional Limits    Objective  ADL  Feeding: Independent  Grooming: Setup(seated only - unable to tolerate in stance )  LE Dressing: Maximum assistance(with donning socks - needing increased assist L vs RLE 2/2 L foot drop )  Toileting: Minimal assistance(Pt needing steadying assist in stance for over hips )  Additional Comments: Pt reports recently dressing in slip on shoes and skirts 2/2 LE issues. Pt unable to reach past knees to attempt socks this date. Balance  Sitting Balance: Independent  Standing Balance: Contact guard assistance  Standing Balance  Time: 4mins x 2 and 30 sec x 4   Activity: functional mobility in room / functional transfers  Functional Mobility  Functional - Mobility Device: Rolling Walker  Activity: Other; To/from bathroom  Assist Level: Contact guard assistance  Functional Mobility Comments: increased time and effort--  2 standing rest breaks x 20 secs  Toilet Transfers  Toilet - Technique: Ambulating  Equipment Used: Raised toilet seat with rails  Toilet Transfer: Contact guard assistance  Toilet Transfers Comments: Bilateral rails. Pt needing increased effort and encouragement -- freezes 1/2 way up   Bed mobility  Supine to Sit: Moderate assistance  Scooting: Supervision  Transfers  Sit to stand: Contact guard assistance  Stand to sit: Contact guard assistance  Transfer Comments: v.cues for hand placement - Pt attempting to pull up on walker frequently during session     Cognition  Overall Cognitive Status: [facility]     Plan  This note will serve as a discharge summary if patient is discharged from hospital before next treatment session.   Plan  Times per week: 5-7x  Times per day: Daily  Current Treatment Recommendations: Functional Mobility Training, Equipment Evaluation, Education, & procurement, Patient/Caregiver Education & Training, Self-Care / ADL, Safety Education & Training    AM-PAC Score  AM-PAC Inpatient Daily Activity Raw Score: 16  AM-PAC Inpatient ADL T-Scale Score : 35.96  ADL Inpatient CMS 0-100% Score: 53.32  ADL Inpatient CMS G-Code Modifier : CK    Goals  Short term goals  Time Frame for Short term goals: at d/c   Short term goal 1: Stance x 5 mins with CGA for ADLs - not met   Short term goal 2: LE dressing with MIn A and AE prn - goal not met  Short term goal 3: Commode/ RTS transfers with CGA  - part met   Short term goal 4: Chair pushups x 5 reps with CGA  -part met   Patient Goals   Patient goals : Be independent      Therapy Time   Individual Concurrent Group Co-treatment   Time In 1411         Time Out 1441         Minutes 30              Timed Code Treatment Minutes:  30 mins     Total Treatment Minutes:  30 mins       Antwon Erp, OT

## 2019-05-19 NOTE — PROGRESS NOTES
Patient resting in bed. Patient uses bathroom with assist x1 with use of gait belt and walker to ambulate. Tolerating ambulation well. Alert and oriented x 4. Fall precautions in place. Call light within reach. Bed alarm on.  Rowe [place]

## 2019-05-20 ENCOUNTER — APPOINTMENT (OUTPATIENT)
Dept: VASCULAR LAB | Age: 69
DRG: 454 | End: 2019-05-20
Attending: NEUROLOGICAL SURGERY
Payer: MEDICARE

## 2019-05-20 ENCOUNTER — APPOINTMENT (OUTPATIENT)
Dept: GENERAL RADIOLOGY | Age: 69
DRG: 454 | End: 2019-05-20
Attending: NEUROLOGICAL SURGERY
Payer: MEDICARE

## 2019-05-20 VITALS
TEMPERATURE: 98.1 F | DIASTOLIC BLOOD PRESSURE: 80 MMHG | BODY MASS INDEX: 46.61 KG/M2 | RESPIRATION RATE: 16 BRPM | SYSTOLIC BLOOD PRESSURE: 116 MMHG | HEIGHT: 64 IN | WEIGHT: 273 LBS | HEART RATE: 77 BPM | OXYGEN SATURATION: 95 %

## 2019-05-20 PROBLEM — Z98.1 S/P LUMBAR FUSION: Status: ACTIVE | Noted: 2019-05-13

## 2019-05-20 LAB
ANION GAP SERPL CALCULATED.3IONS-SCNC: 14 MMOL/L (ref 3–16)
BUN BLDV-MCNC: 10 MG/DL (ref 7–20)
CALCIUM SERPL-MCNC: 9.3 MG/DL (ref 8.3–10.6)
CHLORIDE BLD-SCNC: 99 MMOL/L (ref 99–110)
CO2: 28 MMOL/L (ref 21–32)
CREAT SERPL-MCNC: <0.5 MG/DL (ref 0.6–1.2)
GFR AFRICAN AMERICAN: >60
GFR NON-AFRICAN AMERICAN: >60
GLUCOSE BLD-MCNC: 113 MG/DL (ref 70–99)
HCT VFR BLD CALC: 27.9 % (ref 36–48)
HEMOGLOBIN: 8.6 G/DL (ref 12–16)
MCH RBC QN AUTO: 23.3 PG (ref 26–34)
MCHC RBC AUTO-ENTMCNC: 30.9 G/DL (ref 31–36)
MCV RBC AUTO: 75.3 FL (ref 80–100)
PDW BLD-RTO: 17.6 % (ref 12.4–15.4)
PLATELET # BLD: 316 K/UL (ref 135–450)
PMV BLD AUTO: 7.9 FL (ref 5–10.5)
POTASSIUM SERPL-SCNC: 3.7 MMOL/L (ref 3.5–5.1)
RBC # BLD: 3.71 M/UL (ref 4–5.2)
SODIUM BLD-SCNC: 141 MMOL/L (ref 136–145)
WBC # BLD: 11.4 K/UL (ref 4–11)

## 2019-05-20 PROCEDURE — 74018 RADEX ABDOMEN 1 VIEW: CPT

## 2019-05-20 PROCEDURE — 6370000000 HC RX 637 (ALT 250 FOR IP): Performed by: NURSE PRACTITIONER

## 2019-05-20 PROCEDURE — 6370000000 HC RX 637 (ALT 250 FOR IP): Performed by: NEUROLOGICAL SURGERY

## 2019-05-20 PROCEDURE — 2580000003 HC RX 258: Performed by: PHYSICIAN ASSISTANT

## 2019-05-20 PROCEDURE — 6360000002 HC RX W HCPCS: Performed by: PHYSICIAN ASSISTANT

## 2019-05-20 PROCEDURE — 80048 BASIC METABOLIC PNL TOTAL CA: CPT

## 2019-05-20 PROCEDURE — 36415 COLL VENOUS BLD VENIPUNCTURE: CPT

## 2019-05-20 PROCEDURE — 85027 COMPLETE CBC AUTOMATED: CPT

## 2019-05-20 PROCEDURE — 93970 EXTREMITY STUDY: CPT

## 2019-05-20 PROCEDURE — 6370000000 HC RX 637 (ALT 250 FOR IP): Performed by: PHYSICIAN ASSISTANT

## 2019-05-20 RX ORDER — TAMSULOSIN HYDROCHLORIDE 0.4 MG/1
0.8 CAPSULE ORAL DAILY
Qty: 14 CAPSULE | Refills: 0 | Status: SHIPPED | OUTPATIENT
Start: 2019-05-21 | End: 2022-01-20 | Stop reason: ALTCHOICE

## 2019-05-20 RX ORDER — OXYCODONE HYDROCHLORIDE 10 MG/1
10 TABLET ORAL EVERY 6 HOURS PRN
Qty: 28 TABLET | Refills: 0 | Status: SHIPPED | OUTPATIENT
Start: 2019-05-20 | End: 2019-05-27

## 2019-05-20 RX ORDER — DIAZEPAM 10 MG/1
10 TABLET ORAL EVERY 6 HOURS PRN
Qty: 28 TABLET | Refills: 0 | Status: SHIPPED | OUTPATIENT
Start: 2019-05-20 | End: 2019-05-27

## 2019-05-20 RX ORDER — METHOCARBAMOL 500 MG/1
1000 TABLET, FILM COATED ORAL 3 TIMES DAILY
Qty: 42 TABLET | Refills: 0 | Status: SHIPPED | OUTPATIENT
Start: 2019-05-20 | End: 2019-05-27

## 2019-05-20 RX ORDER — CALCIUM CARBONATE 200(500)MG
500 TABLET,CHEWABLE ORAL 3 TIMES DAILY PRN
COMMUNITY
Start: 2019-05-20 | End: 2019-06-19

## 2019-05-20 RX ORDER — SENNA AND DOCUSATE SODIUM 50; 8.6 MG/1; MG/1
2 TABLET, FILM COATED ORAL 2 TIMES DAILY
Qty: 28 TABLET | Refills: 0 | Status: SHIPPED | OUTPATIENT
Start: 2019-05-20 | End: 2019-05-27

## 2019-05-20 RX ADMIN — OXYCODONE HYDROCHLORIDE 10 MG: 5 TABLET ORAL at 04:39

## 2019-05-20 RX ADMIN — LEVOTHYROXINE SODIUM 150 MCG: 150 TABLET ORAL at 06:47

## 2019-05-20 RX ADMIN — OXYCODONE HYDROCHLORIDE 10 MG: 5 TABLET ORAL at 16:25

## 2019-05-20 RX ADMIN — ATORVASTATIN CALCIUM 10 MG: 10 TABLET, FILM COATED ORAL at 08:29

## 2019-05-20 RX ADMIN — METOPROLOL TARTRATE 100 MG: 100 TABLET ORAL at 08:29

## 2019-05-20 RX ADMIN — NALOXEGOL OXALATE 25 MG: 25 TABLET, FILM COATED ORAL at 10:36

## 2019-05-20 RX ADMIN — Medication 10 ML: at 08:35

## 2019-05-20 RX ADMIN — ENOXAPARIN SODIUM 40 MG: 40 INJECTION SUBCUTANEOUS at 08:30

## 2019-05-20 RX ADMIN — OXYCODONE HYDROCHLORIDE 10 MG: 5 TABLET ORAL at 08:32

## 2019-05-20 RX ADMIN — AMLODIPINE BESYLATE 10 MG: 10 TABLET ORAL at 08:29

## 2019-05-20 RX ADMIN — SENNOSIDES, DOCUSATE SODIUM 2 TABLET: 50; 8.6 TABLET, FILM COATED ORAL at 08:29

## 2019-05-20 RX ADMIN — TAMSULOSIN HYDROCHLORIDE 0.8 MG: 0.4 CAPSULE ORAL at 08:29

## 2019-05-20 RX ADMIN — METHOCARBAMOL TABLETS 1000 MG: 500 TABLET, COATED ORAL at 08:29

## 2019-05-20 RX ADMIN — METHOCARBAMOL TABLETS 1000 MG: 500 TABLET, COATED ORAL at 16:25

## 2019-05-20 RX ADMIN — FUROSEMIDE 80 MG: 80 TABLET ORAL at 08:29

## 2019-05-20 RX ADMIN — FAMOTIDINE 20 MG: 20 TABLET ORAL at 08:29

## 2019-05-20 RX ADMIN — DIAZEPAM 10 MG: 5 TABLET ORAL at 10:46

## 2019-05-20 RX ADMIN — POTASSIUM CHLORIDE 20 MEQ: 20 TABLET, EXTENDED RELEASE ORAL at 08:29

## 2019-05-20 RX ADMIN — ALLOPURINOL 100 MG: 100 TABLET ORAL at 08:29

## 2019-05-20 ASSESSMENT — PAIN SCALES - GENERAL
PAINLEVEL_OUTOF10: 0
PAINLEVEL_OUTOF10: 7
PAINLEVEL_OUTOF10: 4

## 2019-05-20 ASSESSMENT — PAIN DESCRIPTION - PAIN TYPE
TYPE: ACUTE PAIN;SURGICAL PAIN
TYPE: ACUTE PAIN;SURGICAL PAIN
TYPE: SURGICAL PAIN

## 2019-05-20 ASSESSMENT — PAIN DESCRIPTION - ORIENTATION
ORIENTATION: LOWER
ORIENTATION: MID;LOWER
ORIENTATION: LOWER

## 2019-05-20 ASSESSMENT — PAIN DESCRIPTION - DESCRIPTORS
DESCRIPTORS: ACHING

## 2019-05-20 ASSESSMENT — PAIN DESCRIPTION - DIRECTION
RADIATING_TOWARDS: LEGS
RADIATING_TOWARDS: LEGS
RADIATING_TOWARDS: L LEG

## 2019-05-20 ASSESSMENT — PAIN - FUNCTIONAL ASSESSMENT
PAIN_FUNCTIONAL_ASSESSMENT: PREVENTS OR INTERFERES SOME ACTIVE ACTIVITIES AND ADLS

## 2019-05-20 ASSESSMENT — PAIN DESCRIPTION - ONSET
ONSET: ON-GOING

## 2019-05-20 ASSESSMENT — PAIN DESCRIPTION - LOCATION
LOCATION: BACK

## 2019-05-20 ASSESSMENT — PAIN DESCRIPTION - FREQUENCY
FREQUENCY: CONTINUOUS

## 2019-05-20 ASSESSMENT — PAIN DESCRIPTION - PROGRESSION
CLINICAL_PROGRESSION: NOT CHANGED
CLINICAL_PROGRESSION: GRADUALLY WORSENING
CLINICAL_PROGRESSION: GRADUALLY WORSENING

## 2019-05-20 NOTE — PROGRESS NOTES
Patient A/O x4. Vitals stable. Pain managed with oral medication and repositioning. Up to chair this evening for comfort. Tolerating ambulation well with walker. Will continue to monitor.

## 2019-05-20 NOTE — CARE COORDINATION
Case Management Discharge Assessment    5/20/2019  [facility])  Clinical Case Management Department    Arranged for transport for 1600 today. Faxed orders to [facility].  was present when discharge was discussed. Patient: Juan Barker  MRN: [id number] / Lachelle Ades: 1950  ACCT: [de-identified]          Admission Documentation  Attending Provider: Jae Mejia MD  Admit date/time: 5/13/2019  5:22 AM  Status: Inpatient [101]  Diagnosis: Spondylolisthesis of lumbar region     Readmission within last 30 days:  no     Living Situation  Discharge Planning  Type of Residence: Private Residence  Living Arrangements: Spouse/Significant Other, Children  Support Systems: Spouse/Significant Other, Children  Potential Assistance Needed: N/A  Type of Home Care Services: None  Patient expects to be discharged to[de-identified] home  Expected Discharge Date: 05/16/19    Service Assessment:       Values / Beliefs  Do you have any ethnic, cultural, sacramental, or spiritual Yazidism needs you would like us to be aware of while you are in the hospital?: No    Advance Directives (For Healthcare)  Pre-existing DNR Comfort Care/DNR Arrest/DNI Order: No  Healthcare Directive: No, patient does not have an advance directive for healthcare treatment  Information on Healthcare Directives Requested: No  Patient Requests Assistance: No  Advance Directives: Pt. not interested at this time                        Pharmacy: [id number] Medications:  No  Does patient want to participate in local refill/meds to beds program?: Yes    Notification completed in HENS/PAS?  Yes     IMM  Yes       Discharge disposition: SNF:[facility] Phone: [phone] Fax: [phone]     LOC SNF  Name of [address]  Phone of Facility [phone]  Fax of Facility [phone]    Mode of Transport medical transport  Name of [address]   Number of Transport [phone]  Time of Transport [address] and her family were provided with choice of provider; she and her family are in agreement with the discharge plan.       Care Transitions patient: Sarah Thornton RN  [facility]  Case Management Department  Ph: [phone] Fax: [phone]

## 2019-05-20 NOTE — DISCHARGE INSTR - COC
(please select all that are sent with patient):  None    RN SIGNATURE:  Electronically signed by Alvaro Quintero RN on 5/20/19 at 12:43 PM    CASE MANAGEMENT/SOCIAL WORK SECTION    Inpatient Status Date: 05/13/2019    Readmission Risk Assessment Score:  Readmission Risk              Risk of Unplanned Readmission:        15           Discharging to Facility/ Agency   · Name: [facility]  · Address:  · Phone:report- [phone]  · JRX:[phone]    ·     / signature: Electronically signed by Samina Chapa RN on 5/20/19 at 12:54 PM    PHYSICIAN SECTION    Prognosis: Good    Condition at Discharge: Stable    Rehab Potential (if transferring to Rehab): Good    Recommended Labs or Other Treatments After Discharge: PT/OT    Physician Certification: I certify the above information and transfer of Deshawn Castañeda  is necessary for the continuing treatment of the diagnosis listed and that she requires [place] for less 30 days.      Update Admission H&P: No change in H&P    PHYSICIAN SIGNATURE:  Electronically signed by SHALA Maier CNP on 5/20/19 at 2:51 PM

## 2019-05-20 NOTE — PLAN OF CARE
Problem: Pain - Acute:  Goal: Pain level will decrease  Description  Pain level will decrease  Note:   Pain to lower back goes down legs to toes, weakness and numbness to left leg, medicated with oxycodone 2 tabs po , will monitor       Problem: Pain:  Goal: Pain level will decrease  Description  Pain level will decrease  Note:   Pain to lower back goes down legs to toes, weakness and numbness to left leg, medicated with oxycodone 2 tabs po , will monitor

## 2019-05-20 NOTE — PROGRESS NOTES
CL 99   CO2 28   BUN 10   CREATININE <0.5*   GLUCOSE 113*   CALCIUM 9.3       No results for input(s): PROTIME, INR, APTT in the last 72 hours. Patient Active Problem List    Diagnosis Date Noted    Spondylolisthesis of lumbar region 05/13/2019    Enlarged lymph nodes 05/31/2017    Pulmonary nodules     Mediastinal lymphadenopathy     MARK (obstructive sleep apnea)     CAP (community acquired pneumonia) 02/26/2017    Hypokalemia 02/26/2017       Assessment:  Patient is a 77 y.o. female s/p Procedure(s) (LRB):  L2-3, L3-4 LATERAL LUMBAR INTERBODY FUSION (N/A)  L4-5 TRANSFORAMINAL LUMBAR INTERBODY FUSION WITH PEDICLE SCREW (N/A)    Plan:  1. Neurologically stable  2. Neurologic exams frequency:  Floor: Q4H  3. For change in exam MUST contact neurosurgery team along with critical care or primary team  4. Mobility:  - PT/OT as tolerates  - PMR consulted, appreciate recs  - Left foot drop. Probably related to surgical stretching of the nerve with distraction. Given the lack of severe pain would expect this improve with time. AFO ordered  - BLE venous doppler r/o DVT  5. Diet: Advance as tolerates  6. Antibiotics: Completed  7. Drain: DC today  8. Urinary retention: Resolved, Continue Flomax  9. DVT Prophylaxis: SCD's & Lovenox in AM  10. GI Prophylaxis: Pepcid  11. Constipation:  - XR KUB r/o blockage  - Senokot-S, add Movantik and possible SMOG enema  12. Pain control: PRN Oxycodone  13. Muscle spasms: Robaxin and PRN Valium  14. Orthostatic Hypotension: Resolved  15. Acute Blood Loss Anemia:  - Hgb 8.6 today, improving  - Daily CBC  - Transfuse if Hgb <7.0    DISPO: Will DC to [facility] today as long as all testing results are negative. Patient was discussed with Dr. Juarez Geronimo who agrees with above assessment and plan.      Electronically signed by: SHALA Bender-CNP, 5/20/2019 10:04 AM  [phone]

## 2019-05-20 NOTE — PROGRESS NOTES
Up in room , to bathroom , with 1 person stand by assist with walker, to chair for breakfast, no Bm, since surgery , passing Nikita, positive bowel sounds , pa aware, plan to take Hemovac out after breakfast,

## 2019-05-20 NOTE — PROGRESS NOTES
Drain removed , pt tolerated well , no drainage , gauze and Tegaderm to area, negative dorsi flex , juice aware ,

## 2019-05-20 NOTE — DISCHARGE SUMMARY
Discharge Summary    Date of Admission: 5/13/2019  5:22 AM  Date of Discharge: 5/20/2019  Admission Diagnosis: LUMBAR SPONDYLOLISTHESIS  Discharge Diagnosis: Same   Condition on Discharge: good  Attending for Admission: Stephanie Campos MD  Procedures: Procedure(s) (LRB):  L2-3, L3-4 LATERAL LUMBAR INTERBODY FUSION (N/A)  L4-5 TRANSFORAMINAL LUMBAR INTERBODY FUSION WITH PEDICLE SCREW (N/A)  Consults: INPATIENT CONSULT TO ORTHOTIST/PROSTHETIST    Reason for Admission:  Ceasar Babin is a 77 y.o. female patient who was admitted to the hospital for complaints of chronic lumbar pain and bilateral LE (R>L) pain, numbness and tingling. She underwent the procedure listed above on 5/13/2019. Hospital Course:  After surgery, Her pre-operative chronic lumbar pain and bilateral LE (R>L) pain, numbness and tingling was Absent . She complained of incisional pain. The pain was well-controlled on oral medications. She also c/o left foot drop, which was discussed with Dr. Raegan Elliott who believes it will resolve over time. She also had acute blood loss anemia after surgery that was improving and no transfusions were necessary. Her brace was in place. The incision was clean, dry and intact. There was no erythema or edema around the surgical site. Prior to discharge She was eating well, urinating and ambulating with a steady gait.     Discharge Vitals/Labs:  BP 94/68   Pulse 71   Temp 98.4 °F (36.9 °C) (Oral)   Resp 16   Ht 5' 4\" (1.626 m)   Wt 273 lb (123.8 kg)   SpO2 94%   BMI 46.86 kg/m²   CBC:   Lab Results   Component Value Date    WBC 11.4 05/20/2019    RBC 3.71 05/20/2019    HGB 8.6 05/20/2019    HCT 27.9 05/20/2019    MCV 75.3 05/20/2019    MCH 23.3 05/20/2019    MCHC 30.9 05/20/2019    RDW 17.6 05/20/2019     05/20/2019    MPV 7.9 05/20/2019     CMP:    Lab Results   Component Value Date     05/20/2019    K 3.7 05/20/2019    CL 99 05/20/2019    CO2 28 05/20/2019    BUN 10 05/20/2019    CREATININE MG tablet  Commonly known as:  LASIX     gabapentin 600 MG tablet  Commonly known as:  NEURONTIN     metoprolol 100 MG tablet  Commonly known as:  LOPRESSOR     potassium chloride 20 MEQ extended release tablet  Commonly known as:  KLOR-CON M     SYNTHROID 150 MCG tablet  Generic drug:  levothyroxine        STOP taking these medications    allopurinol 100 MG tablet  Commonly known as:  ZYLOPRIM     oxyCODONE-acetaminophen 5-325 MG per tablet  Commonly known as:  PERCOCET           Where to Get Your Medications      You can get these medications from any pharmacy    Bring a paper prescription for each of these medications  · diazepam 10 MG tablet  · methocarbamol 500 MG tablet  · oxyCODONE HCl 10 MG immediate release tablet  · sennosides-docusate sodium 8.6-50 MG tablet  · tamsulosin 0.4 MG capsule  You don't need a prescription for these medications  · calcium carbonate 500 MG chewable tablet         Discharge Destination:  The patient was discharged to [address] [place] [address]. Follow-up:  The patient is to follow-up with Eli Ramos MD in the office in 2 weeks without xrays. Discharge Instructions:   Verbal and written discharge instructions as well as dressing change instructions were given to the patient at the time of consent. No NSAIDS or anticoagulation for 1 week post-operatively. No driving or riding in a motor vehicle. No lifting or bending.       Electronically signed by: CHRISTINE Miller, 5/20/2019 2:54 PM  [phone]

## 2019-08-16 ENCOUNTER — HOSPITAL ENCOUNTER (OUTPATIENT)
Dept: WOMENS IMAGING | Age: 69
Discharge: HOME | End: 2019-08-16
Payer: MEDICARE

## 2019-08-16 DIAGNOSIS — Z12.39 BREAST CANCER SCREENING: ICD-10-CM

## 2019-08-16 PROCEDURE — 77067 SCR MAMMO BI INCL CAD: CPT

## 2019-09-18 ENCOUNTER — ANESTHESIA EVENT (OUTPATIENT)
Dept: ENDOSCOPY | Age: 69
End: 2019-09-18
Payer: MEDICARE

## 2019-09-20 ENCOUNTER — HOSPITAL ENCOUNTER (OUTPATIENT)
Dept: GENERAL RADIOLOGY | Age: 69
Discharge: HOME | End: 2019-09-20
Payer: MEDICARE

## 2019-09-20 ENCOUNTER — HOSPITAL ENCOUNTER (OUTPATIENT)
Age: 69
Discharge: HOME | End: 2019-09-20
Payer: MEDICARE

## 2019-09-20 DIAGNOSIS — M79.603 UPPER EXTREMITY PAIN, DIFFUSE, UNSPECIFIED LATERALITY: ICD-10-CM

## 2019-09-20 PROCEDURE — 72040 X-RAY EXAM NECK SPINE 2-3 VW: CPT

## 2019-09-26 ENCOUNTER — ANESTHESIA (OUTPATIENT)
Dept: ENDOSCOPY | Age: 69
End: 2019-09-26
Payer: MEDICARE

## 2019-09-26 ENCOUNTER — HOSPITAL ENCOUNTER (OUTPATIENT)
Age: 69
Setting detail: OUTPATIENT SURGERY
Discharge: HOME | End: 2019-09-26
Attending: INTERNAL MEDICINE | Admitting: INTERNAL MEDICINE
Payer: MEDICARE

## 2019-09-26 VITALS
OXYGEN SATURATION: 97 % | BODY MASS INDEX: 44.73 KG/M2 | WEIGHT: 262 LBS | HEIGHT: 64 IN | TEMPERATURE: 96.9 F | HEART RATE: 65 BPM | RESPIRATION RATE: 16 BRPM | SYSTOLIC BLOOD PRESSURE: 144 MMHG | DIASTOLIC BLOOD PRESSURE: 91 MMHG

## 2019-09-26 VITALS
SYSTOLIC BLOOD PRESSURE: 145 MMHG | RESPIRATION RATE: 15 BRPM | DIASTOLIC BLOOD PRESSURE: 77 MMHG | OXYGEN SATURATION: 97 %

## 2019-09-26 PROCEDURE — 7100000010 HC PHASE II RECOVERY - FIRST 15 MIN: Performed by: INTERNAL MEDICINE

## 2019-09-26 PROCEDURE — 6360000002 HC RX W HCPCS: Performed by: NURSE ANESTHETIST, CERTIFIED REGISTERED

## 2019-09-26 PROCEDURE — 2709999900 HC NON-CHARGEABLE SUPPLY: Performed by: INTERNAL MEDICINE

## 2019-09-26 PROCEDURE — 3700000001 HC ADD 15 MINUTES (ANESTHESIA): Performed by: INTERNAL MEDICINE

## 2019-09-26 PROCEDURE — 6370000000 HC RX 637 (ALT 250 FOR IP): Performed by: INTERNAL MEDICINE

## 2019-09-26 PROCEDURE — 7100000011 HC PHASE II RECOVERY - ADDTL 15 MIN: Performed by: INTERNAL MEDICINE

## 2019-09-26 PROCEDURE — 3609010600 HC COLONOSCOPY POLYPECTOMY SNARE/COLD BIOPSY: Performed by: INTERNAL MEDICINE

## 2019-09-26 PROCEDURE — 2500000003 HC RX 250 WO HCPCS: Performed by: NURSE ANESTHETIST, CERTIFIED REGISTERED

## 2019-09-26 PROCEDURE — 2580000003 HC RX 258: Performed by: FAMILY MEDICINE

## 2019-09-26 PROCEDURE — 3700000000 HC ANESTHESIA ATTENDED CARE: Performed by: INTERNAL MEDICINE

## 2019-09-26 PROCEDURE — 2500000003 HC RX 250 WO HCPCS: Performed by: INTERNAL MEDICINE

## 2019-09-26 RX ORDER — SODIUM CHLORIDE 9 MG/ML
INJECTION, SOLUTION INTRAVENOUS CONTINUOUS
Status: DISCONTINUED | OUTPATIENT
Start: 2019-09-26 | End: 2019-09-26 | Stop reason: HOSPADM

## 2019-09-26 RX ORDER — LATANOPROST 50 UG/ML
1 SOLUTION/ DROPS OPHTHALMIC NIGHTLY
COMMUNITY
End: 2022-01-20 | Stop reason: ALTCHOICE

## 2019-09-26 RX ORDER — OXYCODONE HYDROCHLORIDE AND ACETAMINOPHEN 5; 325 MG/1; MG/1
1 TABLET ORAL 2 TIMES DAILY
COMMUNITY

## 2019-09-26 RX ORDER — PROPOFOL 10 MG/ML
INJECTION, EMULSION INTRAVENOUS PRN
Status: DISCONTINUED | OUTPATIENT
Start: 2019-09-26 | End: 2019-09-26 | Stop reason: SDUPTHER

## 2019-09-26 RX ORDER — LIDOCAINE HYDROCHLORIDE 20 MG/ML
INJECTION, SOLUTION INFILTRATION; PERINEURAL PRN
Status: DISCONTINUED | OUTPATIENT
Start: 2019-09-26 | End: 2019-09-26 | Stop reason: SDUPTHER

## 2019-09-26 RX ORDER — SODIUM CHLORIDE 0.9 % (FLUSH) 0.9 %
10 SYRINGE (ML) INJECTION PRN
Status: DISCONTINUED | OUTPATIENT
Start: 2019-09-26 | End: 2019-09-26 | Stop reason: HOSPADM

## 2019-09-26 RX ORDER — SODIUM CHLORIDE 0.9 % (FLUSH) 0.9 %
10 SYRINGE (ML) INJECTION EVERY 12 HOURS SCHEDULED
Status: DISCONTINUED | OUTPATIENT
Start: 2019-09-26 | End: 2019-09-26 | Stop reason: HOSPADM

## 2019-09-26 RX ADMIN — PROPOFOL 40 MG: 10 INJECTION, EMULSION INTRAVENOUS at 09:17

## 2019-09-26 RX ADMIN — PROPOFOL 30 MG: 10 INJECTION, EMULSION INTRAVENOUS at 09:08

## 2019-09-26 RX ADMIN — PROPOFOL 70 MG: 10 INJECTION, EMULSION INTRAVENOUS at 09:04

## 2019-09-26 RX ADMIN — SODIUM CHLORIDE: 9 INJECTION, SOLUTION INTRAVENOUS at 08:26

## 2019-09-26 RX ADMIN — LIDOCAINE HYDROCHLORIDE 60 MG: 20 INJECTION, SOLUTION INFILTRATION; PERINEURAL at 09:04

## 2019-09-26 RX ADMIN — PROPOFOL 30 MG: 10 INJECTION, EMULSION INTRAVENOUS at 09:11

## 2019-09-26 RX ADMIN — PROPOFOL 50 MG: 10 INJECTION, EMULSION INTRAVENOUS at 09:22

## 2019-09-26 RX ADMIN — PROPOFOL 30 MG: 10 INJECTION, EMULSION INTRAVENOUS at 09:06

## 2019-09-26 ASSESSMENT — PAIN - FUNCTIONAL ASSESSMENT: PAIN_FUNCTIONAL_ASSESSMENT: 0-10

## 2019-09-26 ASSESSMENT — PAIN SCALES - GENERAL
PAINLEVEL_OUTOF10: 0

## 2019-09-26 NOTE — ANESTHESIA PRE PROCEDURE
Anesthesia Plan      MAC     ASA 4       Induction: intravenous. Anesthetic plan and risks discussed with patient. Plan discussed with CRNA.             MEDICATIONS:  Current Facility-Administered Medications   Medication Dose Route Frequency Provider Last Rate Last Dose    0.9 % sodium chloride infusion   Intravenous Continuous Anais Ruiz MD        sodium chloride flush 0.9 % injection 10 mL  10 mL Intravenous 2 times per day Anais Ruiz MD        sodium chloride flush 0.9 % injection 10 mL  10 mL Intravenous PRN Anais Ruiz MD            LABS:  Lab Results   Component Value Date    WBC 11.4 (H) 05/20/2019    HGB 8.6 (L) 05/20/2019    HCT 27.9 (L) 05/20/2019    MCV 75.3 (L) 05/20/2019     05/20/2019    GLUCOSE 113 (H) 05/20/2019    PROTIME 14.3 (H) 02/24/2017    INR 1.27 (H) 02/24/2017       Lab Results   Component Value Date     05/20/2019    K 3.7 05/20/2019    CL 99 05/20/2019    CO2 28 05/20/2019    PHOS 5.4 (H) 05/14/2019    BUN 10 05/20/2019    CREATININE <0.5 (L) 05/20/2019       Problem List:  Patient Active Problem List   Diagnosis    CAP (community acquired pneumonia)    Hypokalemia    Pulmonary nodules    Mediastinal lymphadenopathy    MARK (obstructive sleep apnea)    Enlarged lymph nodes    Spondylolisthesis of lumbar region    S/P lumbar fusion         Oscar Schultz MD   9/26/2019

## 2019-10-11 ENCOUNTER — APPOINTMENT (OUTPATIENT)
Dept: PHYSICAL THERAPY | Age: 69
End: 2019-10-11
Payer: MEDICARE

## 2019-10-26 ENCOUNTER — HOSPITAL ENCOUNTER (OUTPATIENT)
Dept: PHYSICAL THERAPY | Age: 69
Setting detail: THERAPIES SERIES
Discharge: HOME | End: 2019-10-26
Payer: MEDICARE

## 2019-10-26 PROCEDURE — 97110 THERAPEUTIC EXERCISES: CPT

## 2019-10-26 PROCEDURE — 97530 THERAPEUTIC ACTIVITIES: CPT

## 2019-10-26 PROCEDURE — 97162 PT EVAL MOD COMPLEX 30 MIN: CPT

## 2019-10-26 PROCEDURE — 97140 MANUAL THERAPY 1/> REGIONS: CPT

## 2019-10-30 ENCOUNTER — HOSPITAL ENCOUNTER (OUTPATIENT)
Dept: PHYSICAL THERAPY | Age: 69
Setting detail: THERAPIES SERIES
Discharge: HOME | End: 2019-10-30
Payer: MEDICARE

## 2019-10-30 PROCEDURE — 97140 MANUAL THERAPY 1/> REGIONS: CPT

## 2019-10-30 PROCEDURE — 97110 THERAPEUTIC EXERCISES: CPT

## 2019-10-31 ENCOUNTER — HOSPITAL ENCOUNTER (EMERGENCY)
Age: 69
Discharge: HOME | End: 2019-10-31
Attending: EMERGENCY MEDICINE
Payer: MEDICARE

## 2019-10-31 VITALS
HEIGHT: 64 IN | RESPIRATION RATE: 18 BRPM | HEART RATE: 88 BPM | OXYGEN SATURATION: 98 % | TEMPERATURE: 97.5 F | BODY MASS INDEX: 45.27 KG/M2 | WEIGHT: 265.19 LBS | DIASTOLIC BLOOD PRESSURE: 90 MMHG | SYSTOLIC BLOOD PRESSURE: 164 MMHG

## 2019-10-31 DIAGNOSIS — M79.672 LEFT FOOT PAIN: Primary | ICD-10-CM

## 2019-10-31 PROCEDURE — 6370000000 HC RX 637 (ALT 250 FOR IP): Performed by: PHYSICIAN ASSISTANT

## 2019-10-31 PROCEDURE — 99283 EMERGENCY DEPT VISIT LOW MDM: CPT

## 2019-10-31 RX ORDER — OXYCODONE HYDROCHLORIDE AND ACETAMINOPHEN 5; 325 MG/1; MG/1
2 TABLET ORAL ONCE
Status: COMPLETED | OUTPATIENT
Start: 2019-10-31 | End: 2019-10-31

## 2019-10-31 RX ADMIN — OXYCODONE HYDROCHLORIDE AND ACETAMINOPHEN 2 TABLET: 5; 325 TABLET ORAL at 17:16

## 2019-10-31 ASSESSMENT — ENCOUNTER SYMPTOMS
ABDOMINAL PAIN: 0
COLOR CHANGE: 0
NAUSEA: 0
PHOTOPHOBIA: 0
BACK PAIN: 0
COUGH: 0
SHORTNESS OF BREATH: 0
VOMITING: 0
RESPIRATORY NEGATIVE: 1
DIARRHEA: 0
CONSTIPATION: 0

## 2019-10-31 ASSESSMENT — PAIN DESCRIPTION - FREQUENCY: FREQUENCY: INTERMITTENT

## 2019-10-31 ASSESSMENT — PAIN DESCRIPTION - DESCRIPTORS: DESCRIPTORS: PINS AND NEEDLES

## 2019-10-31 ASSESSMENT — PAIN SCALES - GENERAL: PAINLEVEL_OUTOF10: 8

## 2019-10-31 ASSESSMENT — PAIN DESCRIPTION - LOCATION: LOCATION: FOOT

## 2019-10-31 ASSESSMENT — PAIN DESCRIPTION - PAIN TYPE: TYPE: ACUTE PAIN

## 2019-11-06 ENCOUNTER — HOSPITAL ENCOUNTER (OUTPATIENT)
Dept: PHYSICAL THERAPY | Age: 69
Setting detail: THERAPIES SERIES
Discharge: HOME | End: 2019-11-06
Payer: MEDICARE

## 2019-11-06 ENCOUNTER — HOSPITAL ENCOUNTER (INPATIENT)
Dept: HOSPITAL 15 - ER | Age: 69
LOS: 3 days | Discharge: HOME | DRG: 254 | End: 2019-11-09
Attending: INTERNAL MEDICINE | Admitting: NURSE PRACTITIONER
Payer: MEDICARE

## 2019-11-06 VITALS — WEIGHT: 193.57 LBS | BODY MASS INDEX: 31.11 KG/M2 | HEIGHT: 66 IN

## 2019-11-06 VITALS — SYSTOLIC BLOOD PRESSURE: 132 MMHG | DIASTOLIC BLOOD PRESSURE: 70 MMHG

## 2019-11-06 DIAGNOSIS — Z86.73: ICD-10-CM

## 2019-11-06 DIAGNOSIS — Z87.01: ICD-10-CM

## 2019-11-06 DIAGNOSIS — J44.9: ICD-10-CM

## 2019-11-06 DIAGNOSIS — Z95.5: ICD-10-CM

## 2019-11-06 DIAGNOSIS — Z87.442: ICD-10-CM

## 2019-11-06 DIAGNOSIS — I70.213: ICD-10-CM

## 2019-11-06 DIAGNOSIS — E11.51: ICD-10-CM

## 2019-11-06 DIAGNOSIS — Z82.49: ICD-10-CM

## 2019-11-06 DIAGNOSIS — Z95.820: ICD-10-CM

## 2019-11-06 DIAGNOSIS — I10: ICD-10-CM

## 2019-11-06 DIAGNOSIS — I25.119: Primary | ICD-10-CM

## 2019-11-06 DIAGNOSIS — Z79.84: ICD-10-CM

## 2019-11-06 LAB
ALBUMIN SERPL-MCNC: 3.5 G/DL (ref 3.4–5)
ALP SERPL-CCNC: 112 U/L (ref 45–117)
ALT SERPL-CCNC: 36 U/L (ref 13–56)
ANION GAP SERPL CALCULATED.3IONS-SCNC: 6 MMOL/L (ref 5–15)
APTT PPP: 28.8 SEC (ref 23.64–32.05)
BILIRUB SERPL-MCNC: 0.3 MG/DL (ref 0.2–1)
BUN SERPL-MCNC: 17 MG/DL (ref 7–18)
BUN/CREAT SERPL: 17.7
CALCIUM SERPL-MCNC: 8.9 MG/DL (ref 8.5–10.1)
CHLORIDE SERPL-SCNC: 107 MMOL/L (ref 98–107)
CHOLEST SERPL-MCNC: 155 MG/DL (ref ?–200)
CO2 SERPL-SCNC: 25 MMOL/L (ref 21–32)
GLUCOSE SERPL-MCNC: 140 MG/DL (ref 74–106)
HCT VFR BLD AUTO: 40 % (ref 36–46)
HDLC SERPL-MCNC: 58 MG/DL (ref 40–59)
HGB BLD-MCNC: 13.4 G/DL (ref 12.2–16.2)
INR PPP: 0.95 (ref 0.9–1.15)
MAGNESIUM SERPL-MCNC: 1.9 MG/DL (ref 1.6–2.6)
MCH RBC QN AUTO: 26.6 PG (ref 28–32)
MCV RBC AUTO: 79.2 FL (ref 80–100)
NRBC BLD QL AUTO: 0.1 %
POTASSIUM SERPL-SCNC: 3.8 MMOL/L (ref 3.5–5.1)
PROT SERPL-MCNC: 7.5 G/DL (ref 6.4–8.2)
SODIUM SERPL-SCNC: 138 MMOL/L (ref 136–145)
TRIGL SERPL-MCNC: 76 MG/DL (ref ?–150)

## 2019-11-06 PROCEDURE — 83036 HEMOGLOBIN GLYCOSYLATED A1C: CPT

## 2019-11-06 PROCEDURE — 93926 LOWER EXTREMITY STUDY: CPT

## 2019-11-06 PROCEDURE — 96375 TX/PRO/DX INJ NEW DRUG ADDON: CPT

## 2019-11-06 PROCEDURE — 99152 MOD SED SAME PHYS/QHP 5/>YRS: CPT

## 2019-11-06 PROCEDURE — 83880 ASSAY OF NATRIURETIC PEPTIDE: CPT

## 2019-11-06 PROCEDURE — 74176 CT ABD & PELVIS W/O CONTRAST: CPT

## 2019-11-06 PROCEDURE — 75716 ARTERY X-RAYS ARMS/LEGS: CPT

## 2019-11-06 PROCEDURE — 86141 C-REACTIVE PROTEIN HS: CPT

## 2019-11-06 PROCEDURE — 85025 COMPLETE CBC W/AUTO DIFF WBC: CPT

## 2019-11-06 PROCEDURE — 85610 PROTHROMBIN TIME: CPT

## 2019-11-06 PROCEDURE — 80048 BASIC METABOLIC PNL TOTAL CA: CPT

## 2019-11-06 PROCEDURE — 93017 CV STRESS TEST TRACING ONLY: CPT

## 2019-11-06 PROCEDURE — 78452 HT MUSCLE IMAGE SPECT MULT: CPT

## 2019-11-06 PROCEDURE — 99153 MOD SED SAME PHYS/QHP EA: CPT

## 2019-11-06 PROCEDURE — 83735 ASSAY OF MAGNESIUM: CPT

## 2019-11-06 PROCEDURE — 96365 THER/PROPH/DIAG IV INF INIT: CPT

## 2019-11-06 PROCEDURE — 85730 THROMBOPLASTIN TIME PARTIAL: CPT

## 2019-11-06 PROCEDURE — 81001 URINALYSIS AUTO W/SCOPE: CPT

## 2019-11-06 PROCEDURE — 97110 THERAPEUTIC EXERCISES: CPT

## 2019-11-06 PROCEDURE — 84484 ASSAY OF TROPONIN QUANT: CPT

## 2019-11-06 PROCEDURE — 93005 ELECTROCARDIOGRAM TRACING: CPT

## 2019-11-06 PROCEDURE — 71250 CT THORAX DX C-: CPT

## 2019-11-06 PROCEDURE — 37224: CPT

## 2019-11-06 PROCEDURE — 82962 GLUCOSE BLOOD TEST: CPT

## 2019-11-06 PROCEDURE — 71045 X-RAY EXAM CHEST 1 VIEW: CPT

## 2019-11-06 PROCEDURE — 93306 TTE W/DOPPLER COMPLETE: CPT

## 2019-11-06 PROCEDURE — 84443 ASSAY THYROID STIM HORMONE: CPT

## 2019-11-06 PROCEDURE — 80053 COMPREHEN METABOLIC PANEL: CPT

## 2019-11-06 PROCEDURE — 36415 COLL VENOUS BLD VENIPUNCTURE: CPT

## 2019-11-06 PROCEDURE — 80061 LIPID PANEL: CPT

## 2019-11-06 RX ADMIN — HYDROCODONE BITARTRATE AND ACETAMINOPHEN PRN TAB: 5; 325 TABLET ORAL at 20:29

## 2019-11-06 RX ADMIN — HYDRALAZINE HYDROCHLORIDE PRN MG: 20 INJECTION INTRAMUSCULAR; INTRAVENOUS at 20:32

## 2019-11-06 RX ADMIN — METOPROLOL TARTRATE SCH MG: 25 TABLET, FILM COATED ORAL at 22:30

## 2019-11-06 RX ADMIN — ATORVASTATIN CALCIUM SCH MG: 20 TABLET, FILM COATED ORAL at 22:30

## 2019-11-06 NOTE — NUR
Admitted to room 294 B. Alert and oriented x4. Denies chest pain at this time. Nitro patch 
to right upper chest. Vitals WNL. Slight rash to lower extremities. Patient verbalized just 
finishing a round of antibiotics for PNA she had over a month ago and says it started around 
the same time. Hydro gel given and called hospitalist for order of benadryl per pt request. 
Large birth daniella down left leg. Skin  clear otherwise. No open wounds noted. Says shes a 
diabetic. Informed hospitalist as well. No new orders at this time IV to left wrist patent 
and saline locked. Oriented to room and hospital policy. Bed low and call light within reach

## 2019-11-06 NOTE — NUR
Paged hospitalist:



New Admission @ [id number]

-Patient takes Ambien PO QHS 10 mg  @ home and requesting to continue.

-Raised rash to lower extremities and complaining of constant itching for the past few days. 
Has been taking benadryl at home PRN and would like an order to continue that as well. 

-History of DM, no accuchecks ordered.

## 2019-11-07 VITALS — SYSTOLIC BLOOD PRESSURE: 128 MMHG | DIASTOLIC BLOOD PRESSURE: 62 MMHG

## 2019-11-07 VITALS — SYSTOLIC BLOOD PRESSURE: 166 MMHG | DIASTOLIC BLOOD PRESSURE: 91 MMHG

## 2019-11-07 VITALS — SYSTOLIC BLOOD PRESSURE: 177 MMHG | DIASTOLIC BLOOD PRESSURE: 90 MMHG

## 2019-11-07 VITALS — DIASTOLIC BLOOD PRESSURE: 81 MMHG | SYSTOLIC BLOOD PRESSURE: 168 MMHG

## 2019-11-07 VITALS — SYSTOLIC BLOOD PRESSURE: 117 MMHG | DIASTOLIC BLOOD PRESSURE: 67 MMHG

## 2019-11-07 LAB
ANION GAP SERPL CALCULATED.3IONS-SCNC: 6 MMOL/L (ref 5–15)
APTT PPP: 26 SEC (ref 23.64–32.05)
BUN SERPL-MCNC: 18 MG/DL (ref 7–18)
BUN/CREAT SERPL: 19.1
CALCIUM SERPL-MCNC: 8.7 MG/DL (ref 8.5–10.1)
CHLORIDE SERPL-SCNC: 107 MMOL/L (ref 98–107)
CO2 SERPL-SCNC: 25 MMOL/L (ref 21–32)
GLUCOSE SERPL-MCNC: 173 MG/DL (ref 74–106)
HCT VFR BLD AUTO: 42.3 % (ref 36–46)
HGB BLD-MCNC: 13.8 G/DL (ref 12.2–16.2)
INR PPP: 0.95 (ref 0.9–1.15)
MCH RBC QN AUTO: 26.5 PG (ref 28–32)
MCV RBC AUTO: 81.5 FL (ref 80–100)
NRBC BLD QL AUTO: 0.4 %
POTASSIUM SERPL-SCNC: 3.8 MMOL/L (ref 3.5–5.1)
SODIUM SERPL-SCNC: 138 MMOL/L (ref 136–145)

## 2019-11-07 RX ADMIN — ASPIRIN SCH MG: 81 TABLET ORAL at 17:07

## 2019-11-07 RX ADMIN — ATORVASTATIN CALCIUM SCH MG: 20 TABLET, FILM COATED ORAL at 21:05

## 2019-11-07 RX ADMIN — HUMAN INSULIN SCH UNITS: 100 INJECTION, SOLUTION SUBCUTANEOUS at 19:13

## 2019-11-07 RX ADMIN — MORPHINE SULFATE PRN MG: 2 INJECTION, SOLUTION INTRAMUSCULAR; INTRAVENOUS at 17:09

## 2019-11-07 RX ADMIN — LISINOPRIL SCH MG: 10 TABLET ORAL at 10:00

## 2019-11-07 RX ADMIN — HUMAN INSULIN SCH UNITS: 100 INJECTION, SOLUTION SUBCUTANEOUS at 22:06

## 2019-11-07 RX ADMIN — HYDROCODONE BITARTRATE AND ACETAMINOPHEN PRN TAB: 5; 325 TABLET ORAL at 21:12

## 2019-11-07 RX ADMIN — MORPHINE SULFATE PRN MG: 2 INJECTION, SOLUTION INTRAMUSCULAR; INTRAVENOUS at 11:58

## 2019-11-07 RX ADMIN — FAMOTIDINE SCH MG: 20 TABLET, FILM COATED ORAL at 17:08

## 2019-11-07 RX ADMIN — LISINOPRIL SCH MG: 10 TABLET ORAL at 17:09

## 2019-11-07 RX ADMIN — MORPHINE SULFATE PRN MG: 2 INJECTION, SOLUTION INTRAMUSCULAR; INTRAVENOUS at 02:38

## 2019-11-07 RX ADMIN — MORPHINE SULFATE PRN MG: 2 INJECTION, SOLUTION INTRAMUSCULAR; INTRAVENOUS at 08:05

## 2019-11-07 RX ADMIN — Medication SCH STRIP: at 21:12

## 2019-11-07 RX ADMIN — MORPHINE SULFATE PRN MG: 2 INJECTION, SOLUTION INTRAMUSCULAR; INTRAVENOUS at 05:49

## 2019-11-07 RX ADMIN — Medication SCH STRIP: at 17:30

## 2019-11-07 RX ADMIN — METOPROLOL TARTRATE SCH MG: 25 TABLET, FILM COATED ORAL at 21:11

## 2019-11-07 RX ADMIN — METOPROLOL TARTRATE SCH MG: 25 TABLET, FILM COATED ORAL at 10:00

## 2019-11-07 RX ADMIN — ASPIRIN SCH MG: 81 TABLET ORAL at 10:00

## 2019-11-07 RX ADMIN — HYDRALAZINE HYDROCHLORIDE PRN MG: 20 INJECTION INTRAMUSCULAR; INTRAVENOUS at 08:12

## 2019-11-07 RX ADMIN — FAMOTIDINE SCH MG: 20 TABLET, FILM COATED ORAL at 10:00

## 2019-11-07 RX ADMIN — METOPROLOL TARTRATE SCH MG: 25 TABLET, FILM COATED ORAL at 17:08

## 2019-11-07 NOTE — NUR
Patient complaining of sharp chest pain again that is also in the right jaw as well. BP 
142/78 P80 R16 O290% on RA. Placed on oxygen @ 2L and oxygen sat went up to 94-95%/ EKG 
performed. Will have MD sign. Morphine given per orders. Tele monitor in place and showing 
SR 78with depressed ST. Denies any other symptoms, No N/V etc. Nitro patch to right upper 
chest still in place. Will continue to monitor

## 2019-11-07 NOTE — NUR
Closing Shift Note

Patient is resting in bed. Patient has been medicated for pain. No distress noted. Report 
given. Will endorse care to the night shift RN.

## 2019-11-07 NOTE — NUR
Cardio Consult 

Terra Long NP, notified this RN that patient will be going to stress test possibly 
today. Will hold morning medications. MARIVEL Long, also states that patient is having pain 
and to give patient her chest pain morphine as ordered. Will administer the morphine once 
patient has IV access.

## 2019-11-07 NOTE — NUR
Opening Shift Note

Assumed care of patient. Patient is awake and alert. No S/S of distress/SOB or pain. 
Instructed on POC and to call for assist PRN, will continue to monitor for changes. Bed 
locked in lowest position and bed rails up x2. Call light within reach.

## 2019-11-07 NOTE — NUR
Opening Shift Note

Assuming care of patient at this time. Patient is awake and alert. Patient is complaining of 
pain to left arm. Bed is locked and lowered with side rails up x2. Instructed patient on the 
plan of care for today and to call for assistance as needed. Call light within reach. Will 
continue to round hourly and as needed.

## 2019-11-07 NOTE — NUR
Loss of IV access

Patient's IV has came out at this time. Patient is holding the tape at this time with blood 
running from insertion site. IV catheter intact. Will attempt to place another IV.

## 2019-11-07 NOTE — NUR
IV attempt

Unable to obtain IV access. Patient states that she has had to have a PICC line before. Will 
put in an order for a midline.

## 2019-11-07 NOTE — NUR
Went looking for MD/John to sign EKG and was unable to find him. Checked around in ER as 
well as physicians jose angel. Notified Katheryn CISNEROS as well. EKG in chart. Patient stable at this 
time. No distress noted.

## 2019-11-08 ENCOUNTER — APPOINTMENT (OUTPATIENT)
Dept: PHYSICAL THERAPY | Age: 69
End: 2019-11-08
Payer: MEDICARE

## 2019-11-08 VITALS — DIASTOLIC BLOOD PRESSURE: 81 MMHG | SYSTOLIC BLOOD PRESSURE: 145 MMHG

## 2019-11-08 VITALS — SYSTOLIC BLOOD PRESSURE: 134 MMHG | DIASTOLIC BLOOD PRESSURE: 87 MMHG

## 2019-11-08 VITALS — DIASTOLIC BLOOD PRESSURE: 87 MMHG | SYSTOLIC BLOOD PRESSURE: 134 MMHG

## 2019-11-08 VITALS — DIASTOLIC BLOOD PRESSURE: 65 MMHG | SYSTOLIC BLOOD PRESSURE: 121 MMHG

## 2019-11-08 VITALS — DIASTOLIC BLOOD PRESSURE: 69 MMHG | SYSTOLIC BLOOD PRESSURE: 129 MMHG

## 2019-11-08 LAB
ANION GAP SERPL CALCULATED.3IONS-SCNC: 6 MMOL/L (ref 5–15)
APTT PPP: 25.1 SEC (ref 23.64–32.05)
BUN SERPL-MCNC: 12 MG/DL (ref 7–18)
BUN/CREAT SERPL: 16.2
CALCIUM SERPL-MCNC: 8.8 MG/DL (ref 8.5–10.1)
CHLORIDE SERPL-SCNC: 106 MMOL/L (ref 98–107)
CO2 SERPL-SCNC: 25 MMOL/L (ref 21–32)
GLUCOSE SERPL-MCNC: 127 MG/DL (ref 74–106)
HCT VFR BLD AUTO: 39.5 % (ref 36–46)
HGB BLD-MCNC: 13.1 G/DL (ref 12.2–16.2)
INR PPP: 0.98 (ref 0.9–1.15)
MAGNESIUM SERPL-MCNC: 2 MG/DL (ref 1.6–2.6)
MCH RBC QN AUTO: 26.5 PG (ref 28–32)
MCV RBC AUTO: 79.9 FL (ref 80–100)
NRBC BLD QL AUTO: 0.2 %
POTASSIUM SERPL-SCNC: 3.9 MMOL/L (ref 3.5–5.1)
SODIUM SERPL-SCNC: 137 MMOL/L (ref 136–145)

## 2019-11-08 PROCEDURE — 047L3Z1 DILATION OF LEFT FEMORAL ARTERY USING DRUG-COATED BALLOON, PERCUTANEOUS APPROACH: ICD-10-PCS | Performed by: INTERNAL MEDICINE

## 2019-11-08 PROCEDURE — B41G1ZZ FLUOROSCOPY OF LEFT LOWER EXTREMITY ARTERIES USING LOW OSMOLAR CONTRAST: ICD-10-PCS | Performed by: INTERNAL MEDICINE

## 2019-11-08 PROCEDURE — B41F1ZZ FLUOROSCOPY OF RIGHT LOWER EXTREMITY ARTERIES USING LOW OSMOLAR CONTRAST: ICD-10-PCS | Performed by: INTERNAL MEDICINE

## 2019-11-08 RX ADMIN — LISINOPRIL SCH MG: 10 TABLET ORAL at 10:16

## 2019-11-08 RX ADMIN — Medication SCH STRIP: at 22:17

## 2019-11-08 RX ADMIN — HUMAN INSULIN SCH UNITS: 100 INJECTION, SOLUTION SUBCUTANEOUS at 06:22

## 2019-11-08 RX ADMIN — METOPROLOL TARTRATE SCH MG: 25 TABLET, FILM COATED ORAL at 22:16

## 2019-11-08 RX ADMIN — HUMAN INSULIN SCH UNITS: 100 INJECTION, SOLUTION SUBCUTANEOUS at 22:53

## 2019-11-08 RX ADMIN — ASPIRIN SCH MG: 81 TABLET ORAL at 10:15

## 2019-11-08 RX ADMIN — HUMAN INSULIN SCH UNITS: 100 INJECTION, SOLUTION SUBCUTANEOUS at 18:02

## 2019-11-08 RX ADMIN — METOPROLOL TARTRATE SCH MG: 25 TABLET, FILM COATED ORAL at 10:16

## 2019-11-08 RX ADMIN — ATORVASTATIN CALCIUM SCH MG: 20 TABLET, FILM COATED ORAL at 22:16

## 2019-11-08 RX ADMIN — FAMOTIDINE SCH MG: 20 TABLET, FILM COATED ORAL at 10:00

## 2019-11-08 RX ADMIN — Medication SCH STRIP: at 17:56

## 2019-11-08 RX ADMIN — Medication SCH STRIP: at 11:35

## 2019-11-08 RX ADMIN — HYDROCODONE BITARTRATE AND ACETAMINOPHEN PRN TAB: 5; 325 TABLET ORAL at 10:17

## 2019-11-08 RX ADMIN — HYDROCODONE BITARTRATE AND ACETAMINOPHEN PRN TAB: 5; 325 TABLET ORAL at 18:38

## 2019-11-08 RX ADMIN — HUMAN INSULIN SCH UNITS: 100 INJECTION, SOLUTION SUBCUTANEOUS at 11:30

## 2019-11-08 RX ADMIN — Medication SCH STRIP: at 06:22

## 2019-11-08 NOTE — NUR
Closing Shift Note

 Patient is resting in bed. No distress noted. Report given. Will endorse care to the night 
shift RN.

## 2019-11-08 NOTE — NUR
Back on Unit

Patient back on unit at this time. No complaints of pain. Patient shows no signs or symptoms 
of distress.

## 2019-11-08 NOTE — NUR
Opening Shift Note

Assumed care of patient. Patient is awake, alert and resting. No S/S of distress/SOB or 
pain. Instructed on POC and to call for assist PRN, will continue to monitor for changes. 
Bed locked in lowest position and bed rails up x2. Call light within reach.

## 2019-11-09 VITALS — SYSTOLIC BLOOD PRESSURE: 153 MMHG | DIASTOLIC BLOOD PRESSURE: 70 MMHG

## 2019-11-09 VITALS — SYSTOLIC BLOOD PRESSURE: 153 MMHG | DIASTOLIC BLOOD PRESSURE: 76 MMHG

## 2019-11-09 VITALS — DIASTOLIC BLOOD PRESSURE: 88 MMHG | SYSTOLIC BLOOD PRESSURE: 165 MMHG

## 2019-11-09 VITALS — DIASTOLIC BLOOD PRESSURE: 77 MMHG | SYSTOLIC BLOOD PRESSURE: 166 MMHG

## 2019-11-09 VITALS — SYSTOLIC BLOOD PRESSURE: 145 MMHG | DIASTOLIC BLOOD PRESSURE: 73 MMHG

## 2019-11-09 LAB
ANION GAP SERPL CALCULATED.3IONS-SCNC: 4 MMOL/L (ref 5–15)
BUN SERPL-MCNC: 15 MG/DL (ref 7–18)
BUN/CREAT SERPL: 19.7
CALCIUM SERPL-MCNC: 8.5 MG/DL (ref 8.5–10.1)
CHLORIDE SERPL-SCNC: 108 MMOL/L (ref 98–107)
CO2 SERPL-SCNC: 20 MMOL/L (ref 21–32)
GLUCOSE SERPL-MCNC: 177 MG/DL (ref 74–106)
HCT VFR BLD AUTO: 44 % (ref 36–46)
HGB BLD-MCNC: 14.1 G/DL (ref 12.2–16.2)
MCH RBC QN AUTO: 26.6 PG (ref 28–32)
MCV RBC AUTO: 83 FL (ref 80–100)
NRBC BLD QL AUTO: 0.1 %
POTASSIUM SERPL-SCNC: 4 MMOL/L (ref 3.5–5.1)
SODIUM SERPL-SCNC: 132 MMOL/L (ref 136–145)

## 2019-11-09 RX ADMIN — HYDROCODONE BITARTRATE AND ACETAMINOPHEN PRN TAB: 5; 325 TABLET ORAL at 04:22

## 2019-11-09 RX ADMIN — HYDROCODONE BITARTRATE AND ACETAMINOPHEN PRN TAB: 5; 325 TABLET ORAL at 10:38

## 2019-11-09 RX ADMIN — LISINOPRIL SCH MG: 10 TABLET ORAL at 10:40

## 2019-11-09 RX ADMIN — Medication SCH STRIP: at 11:30

## 2019-11-09 RX ADMIN — HYDROCODONE BITARTRATE AND ACETAMINOPHEN PRN TAB: 5; 325 TABLET ORAL at 16:32

## 2019-11-09 RX ADMIN — Medication SCH STRIP: at 17:00

## 2019-11-09 RX ADMIN — ASPIRIN SCH MG: 81 TABLET ORAL at 10:38

## 2019-11-09 RX ADMIN — HYDRALAZINE HYDROCHLORIDE PRN MG: 20 INJECTION INTRAMUSCULAR; INTRAVENOUS at 04:23

## 2019-11-09 RX ADMIN — HUMAN INSULIN SCH UNITS: 100 INJECTION, SOLUTION SUBCUTANEOUS at 06:52

## 2019-11-09 RX ADMIN — Medication SCH STRIP: at 06:52

## 2019-11-09 RX ADMIN — HUMAN INSULIN SCH UNITS: 100 INJECTION, SOLUTION SUBCUTANEOUS at 11:30

## 2019-11-09 RX ADMIN — FAMOTIDINE SCH MG: 20 TABLET, FILM COATED ORAL at 10:39

## 2019-11-09 RX ADMIN — METOPROLOL TARTRATE SCH MG: 25 TABLET, FILM COATED ORAL at 10:39

## 2019-11-11 ENCOUNTER — HOSPITAL ENCOUNTER (OUTPATIENT)
Dept: PHYSICAL THERAPY | Age: 69
Setting detail: THERAPIES SERIES
Discharge: HOME | End: 2019-11-11
Payer: MEDICARE

## 2019-11-11 PROCEDURE — 97110 THERAPEUTIC EXERCISES: CPT

## 2019-11-13 ENCOUNTER — HOSPITAL ENCOUNTER (OUTPATIENT)
Dept: PHYSICAL THERAPY | Age: 69
Setting detail: THERAPIES SERIES
Discharge: HOME | End: 2019-11-13
Payer: MEDICARE

## 2019-11-13 PROCEDURE — 97110 THERAPEUTIC EXERCISES: CPT

## 2019-11-15 ENCOUNTER — HOSPITAL ENCOUNTER (OUTPATIENT)
Dept: PHYSICAL THERAPY | Age: 69
Setting detail: THERAPIES SERIES
Discharge: HOME | End: 2019-11-15
Payer: MEDICARE

## 2019-11-15 PROCEDURE — 97110 THERAPEUTIC EXERCISES: CPT

## 2019-11-19 ENCOUNTER — HOSPITAL ENCOUNTER (OUTPATIENT)
Dept: PHYSICAL THERAPY | Age: 69
Setting detail: THERAPIES SERIES
Discharge: HOME | End: 2019-11-19
Payer: MEDICARE

## 2019-11-19 PROCEDURE — 97110 THERAPEUTIC EXERCISES: CPT

## 2019-11-22 ENCOUNTER — HOSPITAL ENCOUNTER (OUTPATIENT)
Dept: PHYSICAL THERAPY | Age: 69
Setting detail: THERAPIES SERIES
Discharge: HOME | End: 2019-11-22
Payer: MEDICARE

## 2019-11-22 PROCEDURE — 97110 THERAPEUTIC EXERCISES: CPT

## 2019-11-25 ENCOUNTER — HOSPITAL ENCOUNTER (OUTPATIENT)
Dept: PHYSICAL THERAPY | Age: 69
Setting detail: THERAPIES SERIES
Discharge: HOME | End: 2019-11-25
Payer: MEDICARE

## 2019-11-27 ENCOUNTER — HOSPITAL ENCOUNTER (OUTPATIENT)
Dept: PHYSICAL THERAPY | Age: 69
Setting detail: THERAPIES SERIES
Discharge: HOME | End: 2019-11-27
Payer: MEDICARE

## 2019-11-27 PROCEDURE — 97110 THERAPEUTIC EXERCISES: CPT

## 2019-11-29 ENCOUNTER — HOSPITAL ENCOUNTER (OUTPATIENT)
Dept: PHYSICAL THERAPY | Age: 69
Setting detail: THERAPIES SERIES
Discharge: HOME | End: 2019-11-29
Payer: MEDICARE

## 2019-11-29 PROCEDURE — 97110 THERAPEUTIC EXERCISES: CPT

## 2019-12-02 ENCOUNTER — HOSPITAL ENCOUNTER (OUTPATIENT)
Dept: PHYSICAL THERAPY | Age: 69
Setting detail: THERAPIES SERIES
Discharge: HOME | End: 2019-12-02
Payer: MEDICARE

## 2019-12-02 PROCEDURE — 97113 AQUATIC THERAPY/EXERCISES: CPT

## 2019-12-05 ENCOUNTER — HOSPITAL ENCOUNTER (OUTPATIENT)
Dept: PHYSICAL THERAPY | Age: 69
Setting detail: THERAPIES SERIES
Discharge: HOME | End: 2019-12-05
Payer: MEDICARE

## 2019-12-05 PROCEDURE — 97113 AQUATIC THERAPY/EXERCISES: CPT

## 2019-12-05 PROCEDURE — 97150 GROUP THERAPEUTIC PROCEDURES: CPT

## 2019-12-06 ENCOUNTER — HOSPITAL ENCOUNTER (OUTPATIENT)
Dept: CT IMAGING | Age: 69
Discharge: HOME | End: 2019-12-06
Payer: MEDICARE

## 2019-12-06 ENCOUNTER — HOSPITAL ENCOUNTER (OUTPATIENT)
Dept: GENERAL RADIOLOGY | Age: 69
Discharge: HOME | End: 2019-12-06
Payer: MEDICARE

## 2019-12-06 VITALS
SYSTOLIC BLOOD PRESSURE: 138 MMHG | HEIGHT: 64 IN | BODY MASS INDEX: 45.41 KG/M2 | TEMPERATURE: 97.9 F | HEART RATE: 74 BPM | WEIGHT: 266 LBS | OXYGEN SATURATION: 99 % | DIASTOLIC BLOOD PRESSURE: 74 MMHG | RESPIRATION RATE: 16 BRPM

## 2019-12-06 DIAGNOSIS — M48.061 SPINAL STENOSIS OF LUMBAR REGION, UNSPECIFIED WHETHER NEUROGENIC CLAUDICATION PRESENT: ICD-10-CM

## 2019-12-06 DIAGNOSIS — Z48.89 OTHER SPECIFIED AFTERCARE FOLLOWING SURGERY: ICD-10-CM

## 2019-12-06 LAB
APTT: 29.3 SEC (ref 24.2–36.2)
INR BLD: 1.03 (ref 0.86–1.14)
PROTHROMBIN TIME: 12 SEC (ref 10–13.2)

## 2019-12-06 PROCEDURE — 85730 THROMBOPLASTIN TIME PARTIAL: CPT

## 2019-12-06 PROCEDURE — 2500000003 HC RX 250 WO HCPCS

## 2019-12-06 PROCEDURE — 72132 CT LUMBAR SPINE W/DYE: CPT

## 2019-12-06 PROCEDURE — 7100000011 HC PHASE II RECOVERY - ADDTL 15 MIN

## 2019-12-06 PROCEDURE — 36415 COLL VENOUS BLD VENIPUNCTURE: CPT

## 2019-12-06 PROCEDURE — 72265 MYELOGRAPHY L-S SPINE: CPT

## 2019-12-06 PROCEDURE — 85610 PROTHROMBIN TIME: CPT

## 2019-12-06 PROCEDURE — 6360000004 HC RX CONTRAST MEDICATION

## 2019-12-06 PROCEDURE — 7100000010 HC PHASE II RECOVERY - FIRST 15 MIN

## 2019-12-06 RX ORDER — LIDOCAINE HYDROCHLORIDE 10 MG/ML
INJECTION, SOLUTION EPIDURAL; INFILTRATION; INTRACAUDAL; PERINEURAL
Status: COMPLETED
Start: 2019-12-06 | End: 2019-12-06

## 2019-12-06 RX ADMIN — LIDOCAINE HYDROCHLORIDE 5 ML: 10 INJECTION, SOLUTION EPIDURAL; INFILTRATION; INTRACAUDAL; PERINEURAL at 12:03

## 2019-12-06 RX ADMIN — IOPAMIDOL 12 ML: 408 INJECTION, SOLUTION INTRATHECAL at 12:04

## 2019-12-06 ASSESSMENT — PAIN - FUNCTIONAL ASSESSMENT: PAIN_FUNCTIONAL_ASSESSMENT: 0-10

## 2019-12-09 ENCOUNTER — HOSPITAL ENCOUNTER (OUTPATIENT)
Dept: PHYSICAL THERAPY | Age: 69
Setting detail: THERAPIES SERIES
Discharge: HOME | End: 2019-12-09
Payer: MEDICARE

## 2019-12-09 PROCEDURE — 97150 GROUP THERAPEUTIC PROCEDURES: CPT

## 2019-12-09 PROCEDURE — 97113 AQUATIC THERAPY/EXERCISES: CPT

## 2019-12-12 ENCOUNTER — HOSPITAL ENCOUNTER (OUTPATIENT)
Dept: PHYSICAL THERAPY | Age: 69
Setting detail: THERAPIES SERIES
Discharge: HOME | End: 2019-12-12
Payer: MEDICARE

## 2019-12-12 PROCEDURE — 97150 GROUP THERAPEUTIC PROCEDURES: CPT

## 2019-12-12 PROCEDURE — 97113 AQUATIC THERAPY/EXERCISES: CPT

## 2019-12-16 ENCOUNTER — HOSPITAL ENCOUNTER (OUTPATIENT)
Dept: PHYSICAL THERAPY | Age: 69
Setting detail: THERAPIES SERIES
Discharge: HOME | End: 2019-12-16
Payer: MEDICARE

## 2019-12-16 PROCEDURE — 97113 AQUATIC THERAPY/EXERCISES: CPT

## 2019-12-16 PROCEDURE — 97150 GROUP THERAPEUTIC PROCEDURES: CPT

## 2019-12-19 ENCOUNTER — HOSPITAL ENCOUNTER (OUTPATIENT)
Dept: PHYSICAL THERAPY | Age: 69
Setting detail: THERAPIES SERIES
Discharge: HOME | End: 2019-12-19
Payer: MEDICARE

## 2019-12-19 PROCEDURE — 97113 AQUATIC THERAPY/EXERCISES: CPT

## 2019-12-19 PROCEDURE — 97150 GROUP THERAPEUTIC PROCEDURES: CPT

## 2019-12-23 ENCOUNTER — HOSPITAL ENCOUNTER (OUTPATIENT)
Dept: PHYSICAL THERAPY | Age: 69
Setting detail: THERAPIES SERIES
Discharge: HOME | End: 2019-12-23
Payer: MEDICARE

## 2019-12-23 PROCEDURE — 97113 AQUATIC THERAPY/EXERCISES: CPT

## 2019-12-23 PROCEDURE — 97150 GROUP THERAPEUTIC PROCEDURES: CPT

## 2019-12-26 ENCOUNTER — HOSPITAL ENCOUNTER (OUTPATIENT)
Dept: PHYSICAL THERAPY | Age: 69
Setting detail: THERAPIES SERIES
Discharge: HOME | End: 2019-12-26
Payer: MEDICARE

## 2019-12-26 PROCEDURE — 97113 AQUATIC THERAPY/EXERCISES: CPT

## 2019-12-26 PROCEDURE — 97150 GROUP THERAPEUTIC PROCEDURES: CPT

## 2019-12-30 ENCOUNTER — HOSPITAL ENCOUNTER (OUTPATIENT)
Dept: PHYSICAL THERAPY | Age: 69
Setting detail: THERAPIES SERIES
Discharge: HOME | End: 2019-12-30
Payer: MEDICARE

## 2019-12-30 PROCEDURE — 97110 THERAPEUTIC EXERCISES: CPT

## 2019-12-30 PROCEDURE — 97530 THERAPEUTIC ACTIVITIES: CPT

## 2019-12-31 ENCOUNTER — HOSPITAL ENCOUNTER (OUTPATIENT)
Dept: PHYSICAL THERAPY | Age: 69
Setting detail: THERAPIES SERIES
Discharge: HOME | End: 2019-12-31
Payer: MEDICARE

## 2019-12-31 PROCEDURE — 97113 AQUATIC THERAPY/EXERCISES: CPT

## 2019-12-31 PROCEDURE — 97150 GROUP THERAPEUTIC PROCEDURES: CPT

## 2019-12-31 NOTE — PROGRESS NOTES
[facility]       Physical Therapy Treatment Note/ Progress Report:     Date:  2019    Patient Name:  Sylvia Kovacs     :  1950  MRN: [id number]  Restrictions/Precautions:    Medical/Treatment Diagnosis Information:  · Diagnosis: Spinal Stenosis M48.061; Lumbar Radiculopathy M54.16  · Treatment Diagnosis: Decreased Trunk and B hip strength limiting Functional transfers/Gait/Ambulation  Insurance/Certification information:  PT Insurance Information: [employer] $25 copay eval only; Medically necessary visits; Medicare guidelines  Physician Information:  Referring Practitioner: Dr. Stella Wyatt of care signed (Y/N): [x]  Yes []  No signed 10/28/19    Date of Patient follow up with Physician:      Progress Report: []  Yes  [x]  No     Date Range for reporting period:  Beginning: 10/26/2019  Ending:       Progress report due (10 Rx/or 30 days whichever is less):      Recertification due (POC duration/ or 90 days whichever is less):      Visit # Insurance Allowable Auth required? Date Range    +  + 08 req [place] []  Yes  [x]  No NA     Latex Allergy:  [x]NO      []YES  Preferred Language for Healthcare:   [x]English       []other:    Functional Scale: Oswestry: 58%  Date assessed:10/26/2019     Oswestry 60%  Date assessed: 19    Pain level:  4/10     SUBJECTIVE:  Pt reports pain throughout her low back, legs, foot, and toes.      OBJECTIVE:   : Stairs: 6 in steps with heavy use of B HRs and reciprocal pattern    Ambulating with rolling walker and moderate fwd trunk, L LE in ER   LE MMT:   Right:  Left:  Hip Flexion: seated  4  4     Hip IR:    4+  3   Hip ER:   4+  4+  Knee Extension:  4+  4+  Knee Flexion:    4+  4  Ankle DF:   4+  Limited    Balance: NBOS 30 sec; R tandem 30 sec with intermittent tapping; L tandem 30 sec  6 MWT with RW: 670 ft     Observation: 10/30 ambulates with RW   Test measurements: 11/29/19: Pt able to climb 6 stairs with 1 HR, reciprocal pattern, but is very slow. L LE is in ER. LE MMT:   Right:  Left:  Hip Flexion: seated  4  4  Hip aBduction:   3+  3  Hip IR:    4+  3   Hip ER:   4+  4+  Knee Extension:  4+  4+  Knee Flexion:    4+  4-  Ankle DF:   4+  Limited       RESTRICTIONS/PRECAUTIONS: L2 - L5 lumbar fusion on May 13, 2019 - has been wearing AFO on L LE since July     Exercises/Interventions:     Therapeutic Exercise (86365) Resistance / level Sets / Seconds Reps Notes / Cues   Scifit   Nustep 1   6 min   12/30: Unable to determine level, screen on Nustep not working   IB     HR/TR  Limited ROM on L LE    Piriformis stretch     Supine hip abduction     Supine marching     TA iso     Bent Knee Fall out w/ TB Lime green With TA   Hooklying Adductor Ball squeeze     Seated HR/TR  Performed with L AFO donned, used for muscle activation (limited ROM)   Seated Hip IR/ER     Seated HSS      Mini bridge  With TA - cues to just clear buttocks from plinth   LAQ Cues to not complete full ROM to avoid hyperextension    Standing HS Curl     Standing 3 way SLR   B UE support   Standing marching                                   Therapeutic Activities (23636)       STS from mat table Minimal use of UEs, placed in lap   Fwd step ups 4 in     Lateral step ups 4 in Cues to not let L LE turn into ER                  Neuromuscular Re-ed (85859)       Tandem Airex     NBOS Airex     Fwd step ups to Airex    B UE support                  Manual Intervention (13528)       Prone PA       GISTM/STM       Lumbar Manip       SI Manip       Hip belt mobs       L hip lateral distraction w/ belt    10/30 w/o belt   PROM L hip IR                      AquaticTherapy Dates of Service: 12/2, 12/5, 12/9, 12/12, 12/16, 12/19, 12/23 , 12/26, 12/31  Aquatic Visits Exercises/Activities:   Transfers:          % Immersion:            Ambulation:   UE Exercises:       Forwards   x1 with 1 noodles Shoulder Shrugs      Lateral x 1 with 1 noodles Shoulder Circles  x    Retro   x 1 with 1 noodles Scapular Retraction   x15    Marching   Push Downs       Cariocas   Punching     Jog    Rowing     Multifidi walkouts with paddle   Elbow Flex/Ext x15      Shldr Flex/Ext x15      Shldr aBd/aDd x15   LE Exercises:  Shldr Horiz aBd/aDd x15   HR/TR X 15 Shldr IR/ER x   Marches X 15 Arm Circles x15   Squats X 15 PNF Diagonals    Hamstring Curls X 15 Wall Push Ups    Hip Flexion (SLR) X 15 Figure 8's    Hip aBduction (SLR) X 15 Bilateral Pull Downs    Hip Extension (SLR) X 15      Hip aDduction (SLR)      Hip Circles X 15 Functional:    Hip IR/Er X 15 Step up forward X 15 B   TrA Set  5 sec x 10  Step up lateral  X 15 B   Pelvic Tilts   Step down     Fig 8's   Lunges Forward    LE PNF  Lunges Retro      Lunges Lateral     Balance:   Lower ab curl with noodle     SLS  2 x 15 sec      Tandem Stance 2 x 30 sec       NBOS eyes open  Seated:     NBOS eyes closed  Ankle pumps     Hand to Opposite Knee X 15 Ankle Circles     Fwd Step ups to SLS  Knee Flex/Ext    Lateral Step ups to SLS  Hip aBd/aDd    Stop/Go Gait   Bicycle       Ankle DF/PF      Ankle Inv/Ev    Stretching:       Gastroc/Soleus      Hamstring  2 X 30 sec   Noodle:     Knee Flex Stretch  Leg Press    Piriformis   Noodle Hang at [place]    Hip Flexor  Noodle Hang Deep Water    SKTC  Noodle Bicycle     DKTC       ITB      Quad  Deep Water:    Mid Back   Jog    UT  Jumping Jacks    Post Shoulder  Heel to Toe    Ladder Pull  Hand to Opposite Knee    Pec Stretch  Rocking Horse      [employer] Skier          Cervical:   Other:     AROM Flexion      AROM Extension      AROM Side Bending      AROM Rotation      Chin Tucks      Chin Nods        Aquatic Abbreviation Key  B= Belt DB= Dumbells T= Theratube   H= Hydrotone N= Noodles W= Weights   P= Paddles S= Speedo equipment K= Kickboard     Pt.  Education:  -pt educated on diagnosis, prognosis and expectations for rehab  -pt provided with written and illustrated instructions for HEP  -all pt questions were answered  10/26/19 Pt was educated on PT POC, Diagnosis, Prognosis, pathomechanics as well as frequency and duration of scheduling future physical therapy appointments. Time was also taken on this day to answer all patient questions and participation in PT.   11/29: Discussed with pt progress made and remaining limitations      HEP education/instruction:  12/30: standing 3 way SLR, mini squats, marching, HS curls     Therapeutic Exercise and NMR EXR  [] (34680) Provided verbal/tactile cueing for activities related to strengthening, flexibility, endurance, ROM for improvements in  [] LE / Lumbar: LE, proximal hip, and core control with self care, mobility, lifting, ambulation. [] UE / Cervical: cervical, postural, scapular, scapulothoracic and UE control with self care, reaching, carrying, lifting, house/yardwork, driving, computer work.  [] (04215) Provided verbal/tactile cueing for activities related to improving balance, coordination, kinesthetic sense, posture, motor skill, proprioception to assist with   [] LE / lumbar: LE, proximal hip, and core control in self care, mobility, lifting, ambulation and eccentric single leg control. [] UE / cervical: cervical, scapular, scapulothoracic and UE control with self care, reaching, carrying, lifting, house/yardwork, driving, computer work. [x] (27429) Aquatic therapy with therapeutic exercise. Provided verbal and tactile cueing for activities related to strengthening, flexibility, endurance, ROM for improvements in  [x] LE / lumbar: LE, proximal hip, and core control in self care, mobility, lifting, ambulation and eccentric single leg control. [x] UE / cervical: cervical, scapular, scapulothoracic and UE control with self care, reaching, carrying, lifting, house/yardwork, driving, computer work.    [x] (44075) Therapist is in constant attendance of 2 or more patients providing skilled therapy interventions, but not providing any significant amount of measurable one-on-one time to either patient, for improvements in  [x] LE / lumbar: LE, proximal hip, and core control in self care, mobility, lifting, ambulation and eccentric single leg control. [x] UE / cervical: cervical, scapular, scapulothoracic and UE control with self care, reaching, carrying, lifting, house/yardwork, driving, computer work. Therapeutic Activities:  10/26/19 Pt was educated on PT POC, Diagnosis, Prognosis, pathomechanics as well as frequency and duration of scheduling future physical therapy appointments.  Time was also taken on this day to answer all patient questions and participation in PT.   [] (45640 or ) Provided verbal/tactile cueing for activities related to improving balance, coordination, kinesthetic sense, posture, motor skill, proprioception and motor activation to allow for proper function  with self care and ADLs  [] (85212) Provided training and instruction to the patient for proper core and proximal hip recruitment and positioning with ambulation re-education     Home Exercise Program:    [x] (04226) Reviewed/Progressed HEP activities related to strengthening, flexibility, endurance, ROM of core, proximal hip and LE for functional self-care, mobility, lifting and ambulation   [] (13966) Reviewed/Progressed HEP activities related to improving balance, coordination, kinesthetic sense, posture, motor skill, proprioception of core, proximal hip and LE for self care, mobility, lifting, and ambulation      Manual Treatments:  PROM / STM / Oscillations-Mobs:  G-I, II, III, IV (PA's, Inf., Post.)  [] (06049) Provided manual therapy to mobilize proximal hip and LS spine soft tissue/joints for the purpose of modulating pain, promoting relaxation,  increasing ROM, reducing/eliminating soft tissue swelling/inflammation/restriction, improving soft tissue extensibility and allowing for proper ROM for normal function with self care, mobility, lifting and ambulation. Modalities:   11/11/19, 11/22: CP to low back sitting in chair x 10 min    Charges:  Timed Code Treatment Minutes: 8   Total Treatment Minutes: 42       [] EVAL - LOW (71087)   [] EVAL - MOD (88266)  [] EVAL - HIGH (61460)  [] RE-EVAL (31971)  [] WH(51928) x      [] Ionto  [] NMR (29806) x     [] Vaso  [] Manual (30343) x      [] Ultrasound  [] TA x      [] Mech Traction (67029)  [] GaitTraining x     [] ES (un) (10378):   [] Home Management Training [] ES(attended) (82784)             [x] Aquatics x  1  [] Group  x  [x] Other      GOALS:  Patient stated goal: To walk with a cane instead of a walker. [] Progressing: [] Met: [] Not Met: [] Adjusted    Therapist goals for Patient:   Short Term Goals: To be achieved in: 2 weeks  1. Independent in HEP and progression per patient tolerance, in order to prevent re-injury. [x] Progressing: [] Met: [] Not Met: [] Adjusted  2. Patient will have a decrease in pain to facilitate improvement in movement, function, and ADLs as indicated by Functional Deficits. [x] Progressing: [] Met: [] Not Met: [] Adjusted    Long Term Goals: To be achieved in: 6 weeks  1. Disability index score of 40% or less for the THOMAS to assist with reaching prior level of function. [x] Progressing: [] Met: [] Not Met: [] Adjusted  2. Patient will demonstrate increased AROM to WNL, good LS mobility, good hip ROM to allow for proper joint functioning as indicated by patients ability to navigate steps. [] Progressing: [] Met: [x] Not Met: [] Adjusted  3. Patient will demonstrate an increase in Strength to good proximal hip and core activation to allow for proper functional mobility as indicated by patients ability to ambulate >250ft with a cane. [] Progressing: [] Met: [x] Not Met: [] Adjusted  4. Patient will return to functional activities including standing for >10 minutes without increased symptoms or restriction.    [x] Progressing: [] Met: [] Not Met: [] Adjusted  5. Patient will be able to navigate 6 steps with the use of 1 hand rail. [x] Progressing: [] Met: [] Not Met: [] Adjusted       Overall Progression Towards Functional goals/ Treatment Progress Update:  [] Patient is progressing as expected towards functional goals listed. [] Progression is slowed due to complexities/Impairments listed. [x] Progression has been slowed due to co-morbidities. [] Plan just implemented, too soon to assess goals progression <30days   [] Goals require adjustment due to lack of progress  [] Patient is not progressing as expected and requires additional follow up with physician  [] Other    Persisting Functional Limitations/Impairments:  [x]Sitting [x]Standing   [x]Walking [x]Squatting/bending    [x]Stairs [x]ADL's    [x]Transfers [x]Reaching  [x]Housework []Job related tasks  []Driving []Sports/Recreation   [x]Sleeping []Other:    ASSESSMENT: Pt is almost independent with aquatics routine and will likely be able to complete it by DC. Treatment/Activity Tolerance:  [x] Patient able to complete tx  [] Patient limited by fatique  [] Patient limited by pain  [] Patient limited by other medical complications  [] Other:     Prognosis: [] Good [x] Fair  [] Poor    Patient Requires Follow-up: [x] Yes  [] No    PLAN: See eval. PT 3x / week for 4 weeks. [x] Continue per plan of care [] Alter current plan (see comments)  [] Plan of care initiated [] Hold pending MD visit [] Discharge    Electronically signed by:  Johnny Patel PT DPT         Note: If patient does not return for scheduled/ recommended follow up visits, his note will serve as a discharge from care along with most recent update on progress.

## 2020-01-02 ENCOUNTER — APPOINTMENT (OUTPATIENT)
Dept: PHYSICAL THERAPY | Age: 70
End: 2020-01-02
Payer: MEDICARE

## 2020-01-03 ENCOUNTER — HOSPITAL ENCOUNTER (OUTPATIENT)
Dept: PHYSICAL THERAPY | Age: 70
Setting detail: THERAPIES SERIES
Discharge: HOME | End: 2020-01-03
Payer: MEDICARE

## 2020-01-03 PROCEDURE — 97113 AQUATIC THERAPY/EXERCISES: CPT

## 2020-01-03 PROCEDURE — 97150 GROUP THERAPEUTIC PROCEDURES: CPT

## 2020-01-03 NOTE — PROGRESS NOTES
[facility]       Physical Therapy Treatment Note/ Progress Report:     Date:  1/3/2020    Patient Name:  Zahraa Velasco     :  1950  MRN: [id number]  Restrictions/Precautions:    Medical/Treatment Diagnosis Information:  · Diagnosis: Spinal Stenosis M48.061; Lumbar Radiculopathy M54.16  · Treatment Diagnosis: Decreased Trunk and B hip strength limiting Functional transfers/Gait/Ambulation  Insurance/Certification information:  PT Insurance Information: [employer] $25 copay eval only; Medically necessary visits; Medicare guidelines  Physician Information:  Referring Practitioner: Dr. Monty Siddiqi of care signed (Y/N): [x]  Yes []  No signed 10/28/19    Date of Patient follow up with Physician:      Progress Report: []  Yes  [x]  No     Date Range for reporting period:  Beginning: 10/26/2019  Ending:       Progress report due (10 Rx/or 30 days whichever is less):      Recertification due (POC duration/ or 90 days whichever is less):      Visit # Insurance Allowable Auth required? Date Range    +  +  req [place] []  Yes  [x]  No NA     Latex Allergy:  [x]NO      []YES  Preferred Language for Healthcare:   [x]English       []other:    Functional Scale: Oswestry: 58%  Date assessed:10/26/2019     Oswestry 60%  Date assessed: 19    Pain level:  3/10     SUBJECTIVE:  Pt reports she is doing a little bit better today. Was sick yesterday.      OBJECTIVE:   : Stairs: 6 in steps with heavy use of B HRs and reciprocal pattern    Ambulating with rolling walker and moderate fwd trunk, L LE in ER   LE MMT:   Right:  Left:  Hip Flexion: seated  4  4     Hip IR:    4+  3   Hip ER:   4+  4+  Knee Extension:  4+  4+  Knee Flexion:    4+  4  Ankle DF:   4+  Limited    Balance: NBOS 30 sec; R tandem 30 sec with intermittent tapping; L tandem 30 sec  6 MWT with RW: 670 ft     Observation: 10/30 ambulates with RW   Test measurements: 11/29/19: Pt able to climb 6 stairs with 1 HR, reciprocal pattern, but is very slow. L LE is in ER. LE MMT:   Right:  Left:  Hip Flexion: seated  4  4  Hip aBduction:   3+  3  Hip IR:    4+  3   Hip ER:   4+  4+  Knee Extension:  4+  4+  Knee Flexion:    4+  4-  Ankle DF:   4+  Limited       RESTRICTIONS/PRECAUTIONS: L2 - L5 lumbar fusion on May 13, 2019 - has been wearing AFO on L LE since July     Exercises/Interventions:     Therapeutic Exercise (02470) Resistance / level Sets / Seconds Reps Notes / Cues   Scifit   Nustep 1   6 min   12/30: Unable to determine level, screen on Nustep not working   IB     HR/TR  Limited ROM on L LE    Piriformis stretch     Supine hip abduction     Supine marching     TA iso     Bent Knee Fall out w/ TB Lime green With TA   Hooklying Adductor Ball squeeze     Seated HR/TR  Performed with L AFO donned, used for muscle activation (limited ROM)   Seated Hip IR/ER     Seated HSS      Mini bridge  With TA - cues to just clear buttocks from plinth   LAQ Cues to not complete full ROM to avoid hyperextension    Standing HS Curl     Standing 3 way SLR   B UE support   Standing marching                                   Therapeutic Activities (77485)       STS from mat table Minimal use of UEs, placed in lap   Fwd step ups 4 in     Lateral step ups 4 in Cues to not let L LE turn into ER                  Neuromuscular Re-ed (91843)       Tandem Airex     NBOS Airex     Fwd step ups to Airex    B UE support                  Manual Intervention (31440)       Prone PA       GISTM/STM       Lumbar Manip       SI Manip       Hip belt mobs       L hip lateral distraction w/ belt    10/30 w/o belt   PROM L hip IR                      AquaticTherapy Dates of Service: 12/2, 12/5, 12/9, 12/12, 12/16, 12/19, 12/23 , 12/26, 12/31, 1/3  Aquatic Visits Exercises/Activities:   Transfers:          % Immersion:            Ambulation:   UE Exercises:       Forwards   x1 with 1 noodles Shoulder Shrugs interventions, but not providing any significant amount of measurable one-on-one time to either patient, for improvements in  [x] LE / lumbar: LE, proximal hip, and core control in self care, mobility, lifting, ambulation and eccentric single leg control. [x] UE / cervical: cervical, scapular, scapulothoracic and UE control with self care, reaching, carrying, lifting, house/yardwork, driving, computer work. Therapeutic Activities:  10/26/19 Pt was educated on PT POC, Diagnosis, Prognosis, pathomechanics as well as frequency and duration of scheduling future physical therapy appointments.  Time was also taken on this day to answer all patient questions and participation in PT.   [] (72302 or ) Provided verbal/tactile cueing for activities related to improving balance, coordination, kinesthetic sense, posture, motor skill, proprioception and motor activation to allow for proper function  with self care and ADLs  [] (19025) Provided training and instruction to the patient for proper core and proximal hip recruitment and positioning with ambulation re-education     Home Exercise Program:    [] (40568) Reviewed/Progressed HEP activities related to strengthening, flexibility, endurance, ROM of core, proximal hip and LE for functional self-care, mobility, lifting and ambulation   [] (47438) Reviewed/Progressed HEP activities related to improving balance, coordination, kinesthetic sense, posture, motor skill, proprioception of core, proximal hip and LE for self care, mobility, lifting, and ambulation      Manual Treatments:  PROM / STM / Oscillations-Mobs:  G-I, II, III, IV (PA's, Inf., Post.)  [] (30784) Provided manual therapy to mobilize proximal hip and LS spine soft tissue/joints for the purpose of modulating pain, promoting relaxation,  increasing ROM, reducing/eliminating soft tissue swelling/inflammation/restriction, improving soft tissue extensibility and allowing for proper ROM for normal function with

## 2020-01-07 ENCOUNTER — HOSPITAL ENCOUNTER (OUTPATIENT)
Dept: PHYSICAL THERAPY | Age: 70
Setting detail: THERAPIES SERIES
Discharge: HOME | End: 2020-01-07
Payer: MEDICARE

## 2020-01-09 ENCOUNTER — HOSPITAL ENCOUNTER (OUTPATIENT)
Dept: PHYSICAL THERAPY | Age: 70
Setting detail: THERAPIES SERIES
Discharge: HOME | End: 2020-01-09
Payer: MEDICARE

## 2020-01-09 PROCEDURE — 97150 GROUP THERAPEUTIC PROCEDURES: CPT

## 2020-01-09 PROCEDURE — 97113 AQUATIC THERAPY/EXERCISES: CPT

## 2020-01-09 NOTE — FLOWSHEET NOTE
Test measurements:  11/29/19: Pt able to climb 6 stairs with 1 HR, reciprocal pattern, but is very slow. L LE is in ER. LE MMT:   Right:  Left:  Hip Flexion: seated  4  4  Hip aBduction:   3+  3  Hip IR:    4+  3   Hip ER:   4+  4+  Knee Extension:  4+  4+  Knee Flexion:    4+  4-  Ankle DF:   4+  Limited       RESTRICTIONS/PRECAUTIONS: L2 - L5 lumbar fusion on May 13, 2019 - has been wearing AFO on L LE since July     Exercises/Interventions:     Therapeutic Exercise (79088) Resistance / level Sets / Seconds Reps Notes / Cues   Scifit   Nustep 1   6 min   12/30: Unable to determine level, screen on Nustep not working   IB     HR/TR  Limited ROM on L LE    Piriformis stretch     Supine hip abduction     Supine marching     TA iso     Bent Knee Fall out w/ TB Lime green With TA   Hooklying Adductor Ball squeeze     Seated HR/TR  Performed with L AFO donned, used for muscle activation (limited ROM)   Seated Hip IR/ER     Seated HSS      Mini bridge  With TA - cues to just clear buttocks from plinth   LAQ Cues to not complete full ROM to avoid hyperextension    Standing HS Curl     Standing 3 way SLR   B UE support   Standing marching                                   Therapeutic Activities (99151)       STS from mat table Minimal use of UEs, placed in lap   Fwd step ups 4 in     Lateral step ups 4 in Cues to not let L LE turn into ER                  Neuromuscular Re-ed (23971)       Tandem Airex     NBOS Airex     Fwd step ups to Airex    B UE support                  Manual Intervention (95429)       Prone PA       GISTM/STM       Lumbar Manip       SI Manip       Hip belt mobs       L hip lateral distraction w/ belt    10/30 w/o belt   PROM L hip IR                      AquaticTherapy Dates of Service: 12/2, 12/5, 12/9, 12/12, 12/16, 12/19, 12/23 , 12/26, 12/31, 1/3, 1/9/2020  Aquatic Visits Exercises/Activities:   Transfers:          % Immersion:            Ambulation:   UE Exercises:       Forwards   x1 with 1 noodles Shoulder Shrugs      Lateral   x 1 with 1 noodles Shoulder Circles  x    Retro   x 1 with 1 noodles Scapular Retraction      Marching   Push Downs       [place]   Punching     Jog    Rowing     Multifidi walkouts with paddle   Elbow Flex/Ext x15      Shldr Flex/Ext x15      Shldr aBd/aDd x15   LE Exercises:  Shldr Horiz aBd/aDd x15   HR/TR X 15 Shldr IR/ER x   Marches X 15 Arm Circles x15   Squats X 15 PNF Diagonals    Hamstring Curls X 15 Wall Push Ups    Hip Flexion (SLR) X 15 Figure 8's    Hip aBduction (SLR) X 15 Bilateral Pull Downs    Hip Extension (SLR) X 15      Hip aDduction (SLR)      Hip Circles X 15 Functional:    Hip IR/Er X 15 Step up forward X 15 B   TrA Set  5 sec x 10  Step up lateral  X 15 B   Pelvic Tilts   Step down     Fig 8's   Lunges Forward    LE PNF  Lunges Retro      Lunges Lateral     Balance:   Lower ab curl with noodle     SLS  2 x 15 sec      Tandem Stance 2 x 30 sec       NBOS eyes open  Seated:     NBOS eyes closed  Ankle pumps     Hand to Opposite Knee X 15 Ankle Circles     Fwd Step ups to SLS  Knee Flex/Ext    Lateral Step ups to SLS  Hip aBd/aDd    Stop/Go Gait   Bicycle       Ankle DF/PF      Ankle Inv/Ev    Stretching:       Gastroc/Soleus      Hamstring  2 X 30 sec   Noodle:     Knee Flex Stretch  Leg Press    Piriformis   Noodle Hang at [employer]    Hip Flexor  Noodle Hang Deep Water    SKTC  Noodle Bicycle     DKTC       ITB      Quad  Deep Water:    Mid Back   Jog    UT  Jumping Jacks    Post Shoulder  Heel to Toe    Ladder Pull  Hand to Opposite Knee    Pec Stretch  Rocking Horse      [employer] Skier          Cervical:   Other:     AROM Flexion      AROM Extension      AROM Side Bending      AROM Rotation      Chin Tucks      Chin Nods        Aquatic Abbreviation Key  B= Belt DB= Dumbells T= Theratube   H= Hydrotone N= Noodles W= Weights   P= Paddles S= Speedo equipment K= Kickboard     Pt.  Education:  -pt educated on diagnosis, prognosis and expectations for rehab  -pt provided with written and illustrated instructions for HEP  -all pt questions were answered  10/26/19 Pt was educated on PT POC, Diagnosis, Prognosis, pathomechanics as well as frequency and duration of scheduling future physical therapy appointments. Time was also taken on this day to answer all patient questions and participation in PT.   11/29: Discussed with pt progress made and remaining limitations      HEP education/instruction:  12/30: standing 3 way SLR, mini squats, marching, HS curls     Therapeutic Exercise and NMR EXR  [] (52476) Provided verbal/tactile cueing for activities related to strengthening, flexibility, endurance, ROM for improvements in  [] LE / Lumbar: LE, proximal hip, and core control with self care, mobility, lifting, ambulation. [] UE / Cervical: cervical, postural, scapular, scapulothoracic and UE control with self care, reaching, carrying, lifting, house/yardwork, driving, computer work.  [] (39612) Provided verbal/tactile cueing for activities related to improving balance, coordination, kinesthetic sense, posture, motor skill, proprioception to assist with   [] LE / lumbar: LE, proximal hip, and core control in self care, mobility, lifting, ambulation and eccentric single leg control. [] UE / cervical: cervical, scapular, scapulothoracic and UE control with self care, reaching, carrying, lifting, house/yardwork, driving, computer work. [x] (63795) Aquatic therapy with therapeutic exercise. Provided verbal and tactile cueing for activities related to strengthening, flexibility, endurance, ROM for improvements in  [x] LE / lumbar: LE, proximal hip, and core control in self care, mobility, lifting, ambulation and eccentric single leg control. [x] UE / cervical: cervical, scapular, scapulothoracic and UE control with self care, reaching, carrying, lifting, house/yardwork, driving, computer work.    [x] (67771) Therapist is in constant attendance of 2 or more patients providing skilled therapy interventions, but not providing any significant amount of measurable one-on-one time to either patient, for improvements in  [x] LE / lumbar: LE, proximal hip, and core control in self care, mobility, lifting, ambulation and eccentric single leg control. [x] UE / cervical: cervical, scapular, scapulothoracic and UE control with self care, reaching, carrying, lifting, house/yardwork, driving, computer work. Therapeutic Activities:  10/26/19 Pt was educated on PT POC, Diagnosis, Prognosis, pathomechanics as well as frequency and duration of scheduling future physical therapy appointments.  Time was also taken on this day to answer all patient questions and participation in PT.   [] (56560 or ) Provided verbal/tactile cueing for activities related to improving balance, coordination, kinesthetic sense, posture, motor skill, proprioception and motor activation to allow for proper function  with self care and ADLs  [] (55739) Provided training and instruction to the patient for proper core and proximal hip recruitment and positioning with ambulation re-education     Home Exercise Program:    [] (92234) Reviewed/Progressed HEP activities related to strengthening, flexibility, endurance, ROM of core, proximal hip and LE for functional self-care, mobility, lifting and ambulation   [] (05518) Reviewed/Progressed HEP activities related to improving balance, coordination, kinesthetic sense, posture, motor skill, proprioception of core, proximal hip and LE for self care, mobility, lifting, and ambulation      Manual Treatments:  PROM / STM / Oscillations-Mobs:  G-I, II, III, IV (PA's, Inf., Post.)  [] (07782) Provided manual therapy to mobilize proximal hip and LS spine soft tissue/joints for the purpose of modulating pain, promoting relaxation,  increasing ROM, reducing/eliminating soft tissue swelling/inflammation/restriction, improving soft tissue extensibility and allowing for proper ROM for normal function with self care, mobility, lifting and ambulation. Modalities:   11/11/19, 11/22: CP to low back sitting in chair x 10 min    Charges:  Timed Code Treatment Minutes: 9   Total Treatment Minutes: 39       [] EVAL - LOW (13777)   [] EVAL - MOD (95094)  [] EVAL - HIGH (00150)  [] RE-EVAL (41999)  [] ZS(07560) x      [] Ionto  [] NMR (39792) x     [] Vaso  [] Manual (52741) x      [] Ultrasound  [] TA x      [] Mech Traction (48483)  [] GaitTraining x     [] ES (un) (91984):   [] Home Management Training [] ES(attended) (52248)             [x] Aquatics x  1  [x] Group  X 1  [] Other      GOALS:  Patient stated goal: To walk with a cane instead of a walker. [] Progressing: [] Met: [] Not Met: [] Adjusted    Therapist goals for Patient:   Short Term Goals: To be achieved in: 2 weeks  1. Independent in HEP and progression per patient tolerance, in order to prevent re-injury. [x] Progressing: [] Met: [] Not Met: [] Adjusted  2. Patient will have a decrease in pain to facilitate improvement in movement, function, and ADLs as indicated by Functional Deficits. [x] Progressing: [] Met: [] Not Met: [] Adjusted    Long Term Goals: To be achieved in: 6 weeks  1. Disability index score of 40% or less for the THOMAS to assist with reaching prior level of function. [x] Progressing: [] Met: [] Not Met: [] Adjusted  2. Patient will demonstrate increased AROM to WNL, good LS mobility, good hip ROM to allow for proper joint functioning as indicated by patients ability to navigate steps. [] Progressing: [] Met: [x] Not Met: [] Adjusted  3. Patient will demonstrate an increase in Strength to good proximal hip and core activation to allow for proper functional mobility as indicated by patients ability to ambulate >250ft with a cane. [] Progressing: [] Met: [x] Not Met: [] Adjusted  4. Patient will return to functional activities including standing for >10 minutes without increased symptoms or restriction. [x] Progressing: [] Met: [] Not Met: [] Adjusted  5. Patient will be able to navigate 6 steps with the use of 1 hand rail. [x] Progressing: [] Met: [] Not Met: [] Adjusted       Overall Progression Towards Functional goals/ Treatment Progress Update:  [] Patient is progressing as expected towards functional goals listed. [] Progression is slowed due to complexities/Impairments listed. [x] Progression has been slowed due to co-morbidities. [] Plan just implemented, too soon to assess goals progression <30days   [] Goals require adjustment due to lack of progress  [] Patient is not progressing as expected and requires additional follow up with physician  [] Other    Persisting Functional Limitations/Impairments:  [x]Sitting [x]Standing   [x]Walking [x]Squatting/bending    [x]Stairs [x]ADL's    [x]Transfers [x]Reaching  [x]Housework []Job related tasks  []Driving []Sports/Recreation   [x]Sleeping []Other:    ASSESSMENT: No complaints with activities in pool this date, continue to progress LE stretching. Treatment/Activity Tolerance:  [x] Patient able to complete tx  [] Patient limited by fatique  [] Patient limited by pain  [] Patient limited by other medical complications  [] Other:     Prognosis: [] Good [x] Fair  [] Poor    Patient Requires Follow-up: [x] Yes  [] No    PLAN: See tre. PT 3x / week for 4 weeks. [x] Continue per plan of care [] Alter current plan (see comments)  [] Plan of care initiated [] Hold pending MD visit [] Discharge    Electronically signed by:  Solomon Loya PT DPT         Note: If patient does not return for scheduled/ recommended follow up visits, his note will serve as a discharge from care along with most recent update on progress.

## 2020-01-14 ENCOUNTER — HOSPITAL ENCOUNTER (OUTPATIENT)
Dept: PHYSICAL THERAPY | Age: 70
Setting detail: THERAPIES SERIES
Discharge: HOME | End: 2020-01-14
Payer: MEDICARE

## 2020-01-14 PROCEDURE — 97113 AQUATIC THERAPY/EXERCISES: CPT

## 2020-01-14 PROCEDURE — 97150 GROUP THERAPEUTIC PROCEDURES: CPT

## 2020-01-16 ENCOUNTER — HOSPITAL ENCOUNTER (OUTPATIENT)
Dept: PHYSICAL THERAPY | Age: 70
Setting detail: THERAPIES SERIES
Discharge: HOME | End: 2020-01-16
Payer: MEDICARE

## 2020-01-16 PROCEDURE — 97150 GROUP THERAPEUTIC PROCEDURES: CPT

## 2020-01-16 PROCEDURE — 97113 AQUATIC THERAPY/EXERCISES: CPT

## 2020-01-16 NOTE — FLOWSHEET NOTE
Service: 12/2, 12/5, 12/9, 12/12, 12/16, 12/19, 12/23 , 12/26, 12/31, 1/3, 1/9/2020, 1/14, 1/16  Aquatic Visits Exercises/Activities:   Transfers:          % Immersion:            Ambulation:   UE Exercises:       Forwards   x1 with 1 noodles Shoulder Shrugs      Lateral   x 1 with 1 noodles Shoulder Circles  x    Retro   x 1 with 1 noodles Scapular Retraction      Marching   Push Downs       Cariocas   Punching     Jog    Rowing     Multifidi walkouts with paddle   Elbow Flex/Ext x15      Shldr Flex/Ext x15      Shldr aBd/aDd x15   LE Exercises:  Shldr Horiz aBd/aDd x15   HR/TR X 15 Shldr IR/ER x   Marches X 15 Arm Circles x15   Squats X 15 PNF Diagonals    Hamstring Curls X 15 Wall Push Ups    Hip Flexion (SLR) X 15 Figure 8's    Hip aBduction (SLR) X 15 Bilateral Pull Downs    Hip Extension (SLR) X 15      Hip aDduction (SLR)      Hip Circles X 15 Functional:    Hip IR/Er X 15 Step up forward X 15 B   TrA Set  5 sec x 10  Step up lateral  X 15 B   Pelvic Tilts   Step down     Fig 8's   Lunges Forward    LE PNF  Lunges Retro      Lunges Lateral     Balance:   Lower ab curl with noodle     SLS  2 x 15 sec      Tandem Stance 2 x 30 sec       NBOS eyes open  Seated:     NBOS eyes closed  Ankle pumps     Hand to Opposite Knee X 15 Ankle Circles     Fwd Step ups to SLS  Knee Flex/Ext    Lateral Step ups to SLS  Hip aBd/aDd    Stop/Go Gait   Bicycle       Ankle DF/PF      Ankle Inv/Ev    Stretching:       Gastroc/Soleus      Hamstring  2 X 30 sec   Noodle:     Knee Flex Stretch  Leg Press    Piriformis   Noodle Hang at [place]    Hip Flexor  Noodle Hang Deep Water    SKTC  Noodle Bicycle     DKTC       ITB      Quad  Deep Water:    Mid Back   Jog    UT  Jumping Jacks    Post Shoulder  Heel to Toe    Ladder Pull  Hand to Opposite Knee    Pec Stretch  Rocking Horse      [employer] Skier          Cervical:   Other:     AROM Flexion      AROM Extension      AROM Side Bending      AROM Rotation      Chin Tucks      Chin Nods Aquatic Abbreviation Key  B= Belt DB= Dumbells T= Theratube   H= Hydrotone N= Noodles W= Weights   P= Paddles S= Speedo equipment K= Kickboard     Pt. Education:  -pt educated on diagnosis, prognosis and expectations for rehab  -pt provided with written and illustrated instructions for HEP  -all pt questions were answered  10/26/19 Pt was educated on PT POC, Diagnosis, Prognosis, pathomechanics as well as frequency and duration of scheduling future physical therapy appointments. Time was also taken on this day to answer all patient questions and participation in PT.   11/29: Discussed with pt progress made and remaining limitations      HEP education/instruction:  12/30: standing 3 way SLR, mini squats, marching, HS curls     Therapeutic Exercise and NMR EXR  [] (12033) Provided verbal/tactile cueing for activities related to strengthening, flexibility, endurance, ROM for improvements in  [] LE / Lumbar: LE, proximal hip, and core control with self care, mobility, lifting, ambulation. [] UE / Cervical: cervical, postural, scapular, scapulothoracic and UE control with self care, reaching, carrying, lifting, house/yardwork, driving, computer work.  [] (97879) Provided verbal/tactile cueing for activities related to improving balance, coordination, kinesthetic sense, posture, motor skill, proprioception to assist with   [] LE / lumbar: LE, proximal hip, and core control in self care, mobility, lifting, ambulation and eccentric single leg control. [] UE / cervical: cervical, scapular, scapulothoracic and UE control with self care, reaching, carrying, lifting, house/yardwork, driving, computer work. [x] (48894) Aquatic therapy with therapeutic exercise. Provided verbal and tactile cueing for activities related to strengthening, flexibility, endurance, ROM for improvements in  [x] LE / lumbar: LE, proximal hip, and core control in self care, mobility, lifting, ambulation and eccentric single leg control.    [x] UE / cervical: cervical, scapular, scapulothoracic and UE control with self care, reaching, carrying, lifting, house/yardwork, driving, computer work. [x] (89346) Therapist is in constant attendance of 2 or more patients providing skilled therapy interventions, but not providing any significant amount of measurable one-on-one time to either patient, for improvements in  [x] LE / lumbar: LE, proximal hip, and core control in self care, mobility, lifting, ambulation and eccentric single leg control. [x] UE / cervical: cervical, scapular, scapulothoracic and UE control with self care, reaching, carrying, lifting, house/yardwork, driving, computer work. Therapeutic Activities:  10/26/19 Pt was educated on PT POC, Diagnosis, Prognosis, pathomechanics as well as frequency and duration of scheduling future physical therapy appointments.  Time was also taken on this day to answer all patient questions and participation in PT.   [] (47932 or ) Provided verbal/tactile cueing for activities related to improving balance, coordination, kinesthetic sense, posture, motor skill, proprioception and motor activation to allow for proper function  with self care and ADLs  [] (27168) Provided training and instruction to the patient for proper core and proximal hip recruitment and positioning with ambulation re-education     Home Exercise Program:    [] (70051) Reviewed/Progressed HEP activities related to strengthening, flexibility, endurance, ROM of core, proximal hip and LE for functional self-care, mobility, lifting and ambulation   [] (00631) Reviewed/Progressed HEP activities related to improving balance, coordination, kinesthetic sense, posture, motor skill, proprioception of core, proximal hip and LE for self care, mobility, lifting, and ambulation      Manual Treatments:  PROM / STM / Oscillations-Mobs:  G-I, II, III, IV (PA's, Inf., Post.)  [] (99931) Provided manual therapy to mobilize proximal hip and LS spine patients ability to ambulate >250ft with a cane. [] Progressing: [] Met: [x] Not Met: [] Adjusted  4. Patient will return to functional activities including standing for >10 minutes without increased symptoms or restriction. [x] Progressing: [] Met: [] Not Met: [] Adjusted  5. Patient will be able to navigate 6 steps with the use of 1 hand rail. [x] Progressing: [] Met: [] Not Met: [] Adjusted       Overall Progression Towards Functional goals/ Treatment Progress Update:  [] Patient is progressing as expected towards functional goals listed. [] Progression is slowed due to complexities/Impairments listed. [x] Progression has been slowed due to co-morbidities. [] Plan just implemented, too soon to assess goals progression <30days   [] Goals require adjustment due to lack of progress  [] Patient is not progressing as expected and requires additional follow up with physician  [] Other    Persisting Functional Limitations/Impairments:  [x]Sitting [x]Standing   [x]Walking [x]Squatting/bending    [x]Stairs [x]ADL's    [x]Transfers [x]Reaching  [x]Housework []Job related tasks  []Driving []Sports/Recreation   [x]Sleeping []Other:    ASSESSMENT: No complaints with activities in pool this date. Continue to progress with aquatic and land hip strengthening and gait training to improve functional mobility. Treatment/Activity Tolerance:  [x] Patient able to complete tx  [] Patient limited by fatique  [] Patient limited by pain  [] Patient limited by other medical complications  [] Other:     Prognosis: [] Good [x] Fair  [] Poor    Patient Requires Follow-up: [x] Yes  [] No    PLAN: See eval. PT 3x / week for 4 weeks.    [x] Continue per plan of care [] Alter current plan (see comments)  [] Plan of care initiated [] Hold pending MD visit [] Discharge    Electronically signed by:Smita Humphreys, SPT    Therapist was present, directed the patient's care, made skilled judgement, and was responsible for assessment and treatment of the patient. Camron Taylor, PT DPT         Note: If patient does not return for scheduled/ recommended follow up visits, his note will serve as a discharge from care along with most recent update on progress.

## 2020-01-21 ENCOUNTER — APPOINTMENT (OUTPATIENT)
Dept: PHYSICAL THERAPY | Age: 70
End: 2020-01-21
Payer: MEDICARE

## 2020-01-23 ENCOUNTER — HOSPITAL ENCOUNTER (OUTPATIENT)
Dept: PHYSICAL THERAPY | Age: 70
Setting detail: THERAPIES SERIES
Discharge: HOME | End: 2020-01-23
Payer: MEDICARE

## 2020-01-23 PROCEDURE — 97110 THERAPEUTIC EXERCISES: CPT

## 2020-01-23 NOTE — DISCHARGE SUMMARY
Diagnosis: Decreased Trunk and B hip strength limiting Functional transfers/Gait/Ambulation  Insurance/Certification information:  PT Insurance Information: [employer] $25 copay eval only; Medically necessary visits; Medicare guidelines  Physician Information:  Referring Practitioner: Dr. Val Aranda of care signed (Y/N): [x]  Yes []  No signed 10/28/19    Date of Patient follow up with Physician:      Progress Report: [x]  Yes  []  No     Date Range for reporting period:  Beginning: 10/26/2019  Endin19   Progress report due (10 Rx/or 30 days whichever is less):    Visit 29    Recertification due (POC duration/ or 90 days whichever is less):      Visit # Insurance Allowable Auth required? Date Range    +  +  req [place] []  Yes  [x]  No NA     Latex Allergy:  [x]NO      []YES  Preferred Language for Healthcare:   [x]English       []other:    Functional Scale: Oswestry: 58%  Date assessed:10/26/2019     Oswestry 60%  Date assessed: 19      Pain level:  4/10     SUBJECTIVE:  She states overall she is better than when she started but not a significant improvement. She has more ease going up steps. Patient has not yet stood for ten minutes, she thinks maybe 5 minutes would be her limit.          OBJECTIVE:   : Stairs: 6 in steps  With heavy use of B HRs, able to use reciprocal pattern with cueing   Tandem R: 30 sec; L 30 sec   NBOS 30 sec   6 MWT with  ft   LE MMT:   Right:  Left:  Hip Flexion: seated  4  4     Hip IR:    4+  3+   Hip ER:   4+  4+  Knee Extension:  4+  4+  Knee Flexion:    4+  4  Ankle DF:   4+  Limited    : Stairs: 6 in steps with heavy use of B HRs and reciprocal pattern    Ambulating with rolling walker and moderate fwd trunk, L LE in ER   LE MMT:   Right:  Left:  Hip Flexion: seated  4  4     Hip IR:    4+  3   Hip ER:   4+  4+  Knee Extension:  4+  4+  Knee Flexion:    4+  4  Ankle DF:   4+  Limited    Balance: NBOS 30 sec; R tandem 30 sec with Exercises/Activities:   Transfers:          % Immersion:            Ambulation:   UE Exercises:       Forwards   x1 with 1 noodles Shoulder Shrugs      Lateral   x 1 with 1 noodles Shoulder Circles  x    Retro   x 1 with 1 noodles Scapular Retraction      Marching   Push Downs       Cariocas   Punching     Jog    Rowing     Multifidi walkouts with paddle   Elbow Flex/Ext x15      Shldr Flex/Ext x15      Shldr aBd/aDd x15   LE Exercises:  Shldr Horiz aBd/aDd x15   HR/TR X 15 Shldr IR/ER x   Marches X 15 Arm Circles x15   Squats X 15 PNF Diagonals    Hamstring Curls X 15 Wall Push Ups    Hip Flexion (SLR) X 15 Figure 8's    Hip aBduction (SLR) X 15 Bilateral Pull Downs    Hip Extension (SLR) X 15      Hip aDduction (SLR)      Hip Circles X 15 Functional:    Hip IR/Er X 15 Step up forward X 15 B   TrA Set  5 sec x 10  Step up lateral  X 15 B   Pelvic Tilts   Step down     Fig 8's   Lunges Forward    LE PNF  Lunges Retro      Lunges Lateral     Balance:   Lower ab curl with noodle     SLS  2 x 15 sec      Tandem Stance 2 x 30 sec       NBOS eyes open  Seated:     NBOS eyes closed  Ankle pumps     Hand to Opposite Knee X 15 Ankle Circles     Fwd Step ups to SLS  Knee Flex/Ext    Lateral Step ups to SLS  Hip aBd/aDd    Stop/Go Gait   Bicycle       Ankle DF/PF      Ankle Inv/Ev    Stretching:       Gastroc/Soleus      Hamstring  2 X 30 sec   Noodle:     Knee Flex Stretch  Leg Press    Piriformis   Noodle Hang at [place]    Hip Flexor  Noodle Hang Deep Water    SKTC  Noodle Bicycle     DKTC       ITB      Quad  Deep Water:    Mid Back   Jog    UT  Jumping Jacks    Post Shoulder  Heel to Toe    Ladder Pull  Hand to Opposite Knee    Pec Stretch  Rocking Horse      [employer] Skier          Cervical:   Other:     AROM Flexion      AROM Extension      AROM Side Bending      AROM Rotation      Chin Tucks      Chin Nods        Aquatic Abbreviation Key  B= Belt DB= Dumbells T= Theratube   H= Hydrotone N= Noodles W= Weights   P= Paddles S= Speedo equipment K= Kickboard     Pt. Education:  -pt educated on diagnosis, prognosis and expectations for rehab  -pt provided with written and illustrated instructions for HEP  -all pt questions were answered  10/26/19 Pt was educated on PT POC, Diagnosis, Prognosis, pathomechanics as well as frequency and duration of scheduling future physical therapy appointments. Time was also taken on this day to answer all patient questions and participation in PT.   11/29: Discussed with pt progress made and remaining limitations      HEP education/instruction:  12/30: standing 3 way SLR, mini squats, marching, HS curls     Therapeutic Exercise and NMR EXR  [x] (06413) Provided verbal/tactile cueing for activities related to strengthening, flexibility, endurance, ROM for improvements in  [x] LE / Lumbar: LE, proximal hip, and core control with self care, mobility, lifting, ambulation. [] UE / Cervical: cervical, postural, scapular, scapulothoracic and UE control with self care, reaching, carrying, lifting, house/yardwork, driving, computer work.  [] (32897) Provided verbal/tactile cueing for activities related to improving balance, coordination, kinesthetic sense, posture, motor skill, proprioception to assist with   [] LE / lumbar: LE, proximal hip, and core control in self care, mobility, lifting, ambulation and eccentric single leg control. [] UE / cervical: cervical, scapular, scapulothoracic and UE control with self care, reaching, carrying, lifting, house/yardwork, driving, computer work.   [] (06281) Aquatic therapy with therapeutic exercise. Provided verbal and tactile cueing for activities related to strengthening, flexibility, endurance, ROM for improvements in  [] LE / lumbar: LE, proximal hip, and core control in self care, mobility, lifting, ambulation and eccentric single leg control.    [] UE / cervical: cervical, scapular, scapulothoracic and UE control with self care, reaching, carrying, Adjusted  4. Patient will return to functional activities including standing for >10 minutes without increased symptoms or restriction. [] Progressing: [] Met: [x] Not Met: [] Adjusted  5. Patient will be able to navigate 6 steps with the use of 1 hand rail. [] Progressing: [] Met: [x] Not Met: [] Adjusted       Overall Progression Towards Functional goals/ Treatment Progress Update:  [] Patient is progressing as expected towards functional goals listed. [] Progression is slowed due to complexities/Impairments listed. [x] Progression has been slowed due to co-morbidities. [] Plan just implemented, too soon to assess goals progression <30days   [] Goals require adjustment due to lack of progress  [] Patient is not progressing as expected and requires additional follow up with physician  [] Other    Persisting Functional Limitations/Impairments:  [x]Sitting [x]Standing   [x]Walking [x]Squatting/bending    [x]Stairs [x]ADL's    [x]Transfers [x]Reaching  [x]Housework []Job related tasks  []Driving []Sports/Recreation   [x]Sleeping []Other:    ASSESSMENT: See above    Treatment/Activity Tolerance:  [x] Patient able to complete tx  [] Patient limited by fatique  [] Patient limited by pain  [] Patient limited by other medical complications  [] Other:     Prognosis: [] Good [x] Fair  [] Poor    Patient Requires Follow-up: [] Yes  [x] No    PLAN: See fly PT 3x / week for 4 weeks. [x] Continue per plan of care [] Alter current plan (see comments)  [] Plan of care initiated [] Hold pending MD visit [x] Discharge    Electronically signed by:      Mekhi Davis PT DPT     Note: If patient does not return for scheduled/ recommended follow up visits, his note will serve as a discharge from care along with most recent update on progress.

## 2020-07-20 ENCOUNTER — APPOINTMENT (OUTPATIENT)
Dept: GENERAL RADIOLOGY | Age: 70
End: 2020-07-20
Payer: MEDICARE

## 2020-07-20 ENCOUNTER — HOSPITAL ENCOUNTER (EMERGENCY)
Age: 70
Discharge: HOME | End: 2020-07-20
Attending: EMERGENCY MEDICINE
Payer: MEDICARE

## 2020-07-20 VITALS
BODY MASS INDEX: 46.78 KG/M2 | OXYGEN SATURATION: 96 % | SYSTOLIC BLOOD PRESSURE: 125 MMHG | TEMPERATURE: 98.8 F | RESPIRATION RATE: 24 BRPM | HEIGHT: 64 IN | DIASTOLIC BLOOD PRESSURE: 68 MMHG | WEIGHT: 274 LBS | HEART RATE: 88 BPM

## 2020-07-20 LAB
A/G RATIO: 0.9 (ref 1.1–2.2)
ALBUMIN SERPL-MCNC: 3.8 G/DL (ref 3.4–5)
ALP BLD-CCNC: 81 U/L (ref 40–129)
ALT SERPL-CCNC: 25 U/L (ref 10–40)
ANION GAP SERPL CALCULATED.3IONS-SCNC: 11 MMOL/L (ref 3–16)
AST SERPL-CCNC: 45 U/L (ref 15–37)
BACTERIA: ABNORMAL /HPF
BASOPHILS ABSOLUTE: 0 K/UL (ref 0–0.2)
BASOPHILS RELATIVE PERCENT: 0.4 %
BILIRUB SERPL-MCNC: 0.3 MG/DL (ref 0–1)
BILIRUBIN URINE: NEGATIVE
BLOOD, URINE: NEGATIVE
BUN BLDV-MCNC: 20 MG/DL (ref 7–20)
CALCIUM SERPL-MCNC: 9.3 MG/DL (ref 8.3–10.6)
CHLORIDE BLD-SCNC: 101 MMOL/L (ref 99–110)
CLARITY: ABNORMAL
CO2: 24 MMOL/L (ref 21–32)
COLOR: YELLOW
CREAT SERPL-MCNC: 0.9 MG/DL (ref 0.6–1.2)
EOSINOPHILS ABSOLUTE: 0 K/UL (ref 0–0.6)
EOSINOPHILS RELATIVE PERCENT: 0.1 %
EPITHELIAL CELLS, UA: 3 /HPF (ref 0–5)
GFR AFRICAN AMERICAN: >60
GFR NON-AFRICAN AMERICAN: >60
GLOBULIN: 4.3 G/DL
GLUCOSE BLD-MCNC: 118 MG/DL (ref 70–99)
GLUCOSE URINE: NEGATIVE MG/DL
HCT VFR BLD CALC: 35.5 % (ref 36–48)
HEMOGLOBIN: 11.7 G/DL (ref 12–16)
HYALINE CASTS: 4 /LPF (ref 0–8)
KETONES, URINE: NEGATIVE MG/DL
LACTIC ACID: 1.6 MMOL/L (ref 0.4–2)
LEUKOCYTE ESTERASE, URINE: NEGATIVE
LYMPHOCYTES ABSOLUTE: 1.6 K/UL (ref 1–5.1)
LYMPHOCYTES RELATIVE PERCENT: 20.8 %
MCH RBC QN AUTO: 24.2 PG (ref 26–34)
MCHC RBC AUTO-ENTMCNC: 33.1 G/DL (ref 31–36)
MCV RBC AUTO: 73.2 FL (ref 80–100)
MICROSCOPIC EXAMINATION: YES
MONOCYTES ABSOLUTE: 0.7 K/UL (ref 0–1.3)
MONOCYTES RELATIVE PERCENT: 9.4 %
NEUTROPHILS ABSOLUTE: 5.2 K/UL (ref 1.7–7.7)
NEUTROPHILS RELATIVE PERCENT: 69.3 %
NITRITE, URINE: NEGATIVE
PDW BLD-RTO: 16.1 % (ref 12.4–15.4)
PH UA: 5.5 (ref 5–8)
PLATELET # BLD: 232 K/UL (ref 135–450)
PMV BLD AUTO: 7.9 FL (ref 5–10.5)
POTASSIUM SERPL-SCNC: 3.3 MMOL/L (ref 3.5–5.1)
PROTEIN UA: ABNORMAL MG/DL
RBC # BLD: 4.85 M/UL (ref 4–5.2)
RBC UA: 2 /HPF (ref 0–4)
SODIUM BLD-SCNC: 136 MMOL/L (ref 136–145)
SPECIFIC GRAVITY UA: 1.02 (ref 1–1.03)
TOTAL PROTEIN: 8.1 G/DL (ref 6.4–8.2)
TROPONIN: <0.01 NG/ML
URINE REFLEX TO CULTURE: ABNORMAL
URINE TYPE: ABNORMAL
UROBILINOGEN, URINE: 0.2 E.U./DL
WBC # BLD: 7.5 K/UL (ref 4–11)
WBC UA: 2 /HPF (ref 0–5)

## 2020-07-20 PROCEDURE — 93005 ELECTROCARDIOGRAM TRACING: CPT | Performed by: EMERGENCY MEDICINE

## 2020-07-20 PROCEDURE — 85025 COMPLETE CBC W/AUTO DIFF WBC: CPT

## 2020-07-20 PROCEDURE — 71046 X-RAY EXAM CHEST 2 VIEWS: CPT

## 2020-07-20 PROCEDURE — U0003 INFECTIOUS AGENT DETECTION BY NUCLEIC ACID (DNA OR RNA); SEVERE ACUTE RESPIRATORY SYNDROME CORONAVIRUS 2 (SARS-COV-2) (CORONAVIRUS DISEASE [COVID-19]), AMPLIFIED PROBE TECHNIQUE, MAKING USE OF HIGH THROUGHPUT TECHNOLOGIES AS DESCRIBED BY CMS-2020-01-R: HCPCS

## 2020-07-20 PROCEDURE — 99283 EMERGENCY DEPT VISIT LOW MDM: CPT

## 2020-07-20 PROCEDURE — 84484 ASSAY OF TROPONIN QUANT: CPT

## 2020-07-20 PROCEDURE — 83605 ASSAY OF LACTIC ACID: CPT

## 2020-07-20 PROCEDURE — 87040 BLOOD CULTURE FOR BACTERIA: CPT

## 2020-07-20 PROCEDURE — 6370000000 HC RX 637 (ALT 250 FOR IP): Performed by: PHYSICIAN ASSISTANT

## 2020-07-20 PROCEDURE — 80053 COMPREHEN METABOLIC PANEL: CPT

## 2020-07-20 PROCEDURE — 81001 URINALYSIS AUTO W/SCOPE: CPT

## 2020-07-20 RX ORDER — CEPHALEXIN 500 MG/1
500 CAPSULE ORAL 2 TIMES DAILY
Qty: 10 CAPSULE | Refills: 0 | Status: SHIPPED | OUTPATIENT
Start: 2020-07-20 | End: 2020-07-25

## 2020-07-20 RX ORDER — ONDANSETRON 4 MG/1
4 TABLET, ORALLY DISINTEGRATING ORAL 3 TIMES DAILY PRN
Qty: 20 TABLET | Refills: 0 | Status: SHIPPED | OUTPATIENT
Start: 2020-07-20 | End: 2022-01-20 | Stop reason: ALTCHOICE

## 2020-07-20 RX ORDER — POTASSIUM CHLORIDE 20 MEQ/1
40 TABLET, EXTENDED RELEASE ORAL ONCE
Status: COMPLETED | OUTPATIENT
Start: 2020-07-20 | End: 2020-07-20

## 2020-07-20 RX ADMIN — POTASSIUM CHLORIDE 40 MEQ: 1500 TABLET, EXTENDED RELEASE ORAL at 20:36

## 2020-07-20 ASSESSMENT — ENCOUNTER SYMPTOMS
STRIDOR: 0
CONSTIPATION: 0
COLOR CHANGE: 0
VOMITING: 0
NAUSEA: 0
COUGH: 1
ABDOMINAL DISTENTION: 0
BACK PAIN: 0
ABDOMINAL PAIN: 0
WHEEZING: 0
DIARRHEA: 1
SHORTNESS OF BREATH: 0

## 2020-07-20 NOTE — ED NOTES
Bed: 05  Expected date:   Expected time:   Means of arrival:   Comments:     Patricia Terrazas  07/20/20 [id number]

## 2020-07-20 NOTE — ED PROVIDER NOTES
[address] [facility]        Pt Name: Chelsea Gibbs  MRN: [id number]  [place] 1950  Date of evaluation: 7/20/2020  Provider: Korey Winkler PA-C  PCP: Juan David Calix MD     I have seen and evaluated this patient with my supervising physician Cristel Hutson, [address] [place] [address]       Chief Complaint   Patient presents with    Fatigue     weakness, wants to be checked for covid. hard to stand up. dunia knee pain. diarrhea. denies emesis. started last week       HISTORY OF PRESENT ILLNESS   (Location, Timing/Onset, Context/Setting, Quality, Duration, Modifying Factors, Severity, Associated Signs and Symptoms)  Note limiting factors. Chelsea Gbibs is a 72 y.o. female with history of arthritis, gout, fibromyalgia, hypertension, obesity who presents to the emergency department with complaints of non productive cough, generalized weakness and fatigue, arthralgias and diarrhea for the past week. She thinks that she might have COVID-19. She would like to be tested. She denies chest pain, shortness of breath,  documented fever, abdominal pain or distention, bloody or black stool. She reports  Mild urine frequency. Nursing Notes were all reviewed and agreed with or any disagreements were addressed in the HPI. REVIEW OF SYSTEMS    (2-9 systems for level 4, 10 or more for level 5)     Review of Systems   Constitutional: Positive for fatigue. Negative for chills and fever. HENT: Negative. Eyes: Negative for visual disturbance. Respiratory: Positive for cough. Negative for shortness of breath, wheezing and stridor. Cardiovascular: Negative for chest pain, palpitations and leg swelling. Gastrointestinal: Positive for diarrhea. Negative for abdominal distention, abdominal pain, constipation, nausea and vomiting. Endocrine: Negative. Genitourinary: Positive for frequency.  Negative for dysuria, flank pain, vaginal bleeding and vaginal discharge. Musculoskeletal: Positive for arthralgias. Negative for back pain, neck pain and neck stiffness. Skin: Negative for color change, pallor, rash and wound. Neurological: Negative for dizziness, tremors, seizures, syncope, facial asymmetry, speech difficulty, weakness, light-headedness, numbness and headaches. Psychiatric/Behavioral: Negative for confusion. All other systems reviewed and are negative. Positives and Pertinent negatives as per HPI. Except as noted above in the ROS, all other systems were reviewed and negative.        PAST MEDICAL HISTORY     Past Medical History:   Diagnosis Date    Arthritis     Back pain     Colon polyps     Fibromyalgia     Gout     Hiatal hernia     Hyperlipidemia     Hypertension     Peripheral neuropathy     Spondylolisthesis, lumbar region     Thyroid disease          SURGICAL HISTORY     Past Surgical History:   Procedure Laterality Date    CARPAL TUNNEL RELEASE      and nerve surgery    COLONOSCOPY  9/26/2019    COLONOSCOPY POLYPECTOMY SNARE/COLD BIOPSY performed by Amarilys Fan MD at [address] [place] [address], COLON, DIAGNOSTIC      HYSTERECTOMY      LUMBAR FUSION N/A 5/13/2019    L2-3, L3-4 LATERAL LUMBAR INTERBODY FUSION performed by Keyla Pérez MD at [facility] N/A 5/13/2019    L4-5 TRANSFORAMINAL LUMBAR INTERBODY FUSION WITH PEDICLE SCREW performed by Keyla Pérez MD at [address]       Discharge Medication List as of 7/20/2020  9:35 PM      CONTINUE these medications which have NOT CHANGED    Details   Multiple Vitamins-Minerals (THERAPEUTIC MULTIVITAMIN-MINERALS) tablet Take 1 tablet by mouth dailyHistorical Med      allopurinol (ZYLOPRIM) 100 MG tablet Take 100 mg by mouth dailyHistorical Med      Niacin (VITAMIN B-3 PO) Take by mouthHistorical Med      oxyCODONE-acetaminophen (PERCOCET) 5-325 MG per tablet Take 1 tablet by mouth 2 times daily. Historical Med      latanoprost (XALATAN) 0.005 % ophthalmic solution 1 drop nightlyHistorical Med      Cholecalciferol (VITAMIN D3) 1000 units CAPS Take 1 capsule by mouth dailyHistorical Med      tamsulosin (FLOMAX) 0.4 MG capsule Take 2 capsules by mouth daily for 7 days, Disp-14 capsule, R-0Print      atorvastatin (LIPITOR) 10 MG tablet Take 10 mg by mouth dailyHistorical Med      potassium chloride (KLOR-CON M) 20 MEQ extended release tablet Take 20 mEq by mouth dailyHistorical Med      metoprolol (LOPRESSOR) 100 MG tablet Take 100 mg by mouth dailyHistorical Med      gabapentin (NEURONTIN) 600 MG tablet Take 600 mg by mouth 3 times daily as needed. Historical Med      levothyroxine (SYNTHROID) 150 MCG tablet Take 150 mcg by mouth Daily. furosemide (LASIX) 80 MG tablet Take 80 mg by mouth daily. amLODIPine (NORVASC) 10 MG tablet Take 10 mg by mouth daily. ALLERGIES     Lisinopril    FAMILYHISTORY       Family History   Problem Relation Age of Onset    Diabetes Mother           SOCIAL HISTORY       Social History     Tobacco Use    Smoking status: Never Smoker    Smokeless tobacco: Never Used   Substance Use Topics    Alcohol use: No    Drug use: No       SCREENINGS             PHYSICAL EXAM    (up to 7 for level 4, 8 or more for level 5)     ED Triage Vitals   BP Temp Temp Source Pulse Resp SpO2 Height Weight   07/20/20 1519 07/20/20 1519 07/20/20 1519 07/20/20 1519 07/20/20 1519 07/20/20 1519 07/20/20 1521 07/20/20 1521   (!) 141/101 98.8 °F (37.1 °C) Temporal 98 18 93 % 5' 4\" (1.626 m) 274 lb (124.3 kg)       Physical Exam  Vitals signs and nursing note reviewed. Constitutional:       Appearance: Normal appearance. She is well-developed. She is not toxic-appearing or diaphoretic. HENT:      Head: Normocephalic and atraumatic.       Right Ear: External ear normal.      Left Ear: External ear normal.      Nose: Nose normal.      Mouth/Throat:      Mouth: Mucous membranes are moist.      Pharynx: Oropharynx is clear. Eyes:      General: No scleral icterus. Right eye: No discharge. Left eye: No discharge. Extraocular Movements: Extraocular movements intact. Conjunctiva/sclera: Conjunctivae normal.      Pupils: Pupils are equal, round, and reactive to light. Neck:      Musculoskeletal: Full passive range of motion without pain, normal range of motion and neck supple. Trachea: Trachea and phonation normal.      Meningeal: Brudzinski's sign and Kernig's sign absent. Cardiovascular:      Rate and Rhythm: Normal rate. Pulmonary:      Effort: Pulmonary effort is normal.      Breath sounds: Normal breath sounds. Abdominal:      General: Bowel sounds are normal. There is no distension. Palpations: Abdomen is soft. Tenderness: There is no abdominal tenderness. Musculoskeletal: Normal range of motion. Skin:     General: Skin is warm and dry. Capillary Refill: Capillary refill takes less than 2 seconds. Coloration: Skin is not jaundiced or pale. Findings: No bruising, erythema, lesion or rash. Neurological:      General: No focal deficit present. Mental Status: She is alert and oriented to person, place, and time. Cranial Nerves: No cranial nerve deficit.    Psychiatric:         Mood and Affect: Mood normal.         Behavior: Behavior normal.         DIAGNOSTIC RESULTS   LABS:    Labs Reviewed   CBC WITH AUTO DIFFERENTIAL - Abnormal; Notable for the following components:       Result Value    Hemoglobin 11.7 (*)     Hematocrit 35.5 (*)     MCV 73.2 (*)     MCH 24.2 (*)     RDW 16.1 (*)     All other components within normal limits    Narrative:     Performed at:  [facility] [address], [place], [facility] [address]   Phone [phone]   COMPREHENSIVE METABOLIC PANEL - Abnormal; Notable for the following components:    Potassium 3.3 (*)     Glucose 118 (*) Albumin/Globulin Ratio 0.9 (*)     AST 45 (*)     All other components within normal limits    Narrative:     Performed at:  [facility] [address] [employer]   Phone [phone]   URINE RT REFLEX TO CULTURE - Abnormal; Notable for the following components:    Clarity, UA CLOUDY (*)     Protein, UA TRACE (*)     All other components within normal limits    Narrative:     Performed at:  [facility] [email or web address]   Phone [phone]   MICROSCOPIC URINALYSIS - Abnormal; Notable for the following components:    Bacteria, UA 4+ (*)     All other components within normal limits    Narrative:     Performed at:  [facility] [address] [facility] [employer] [facility] [employer] [address]   Phone [phone]   CULTURE, BLOOD 1   CULTURE, BLOOD 2   LACTIC ACID, PLASMA    Narrative:     Performed at:  [facility] [address] [place] [employer]   Phone [phone]   TROPONIN    Narrative:     Performed at:  [facility] [address] [employer]   Phone [phone]       All other labs were within normal range or not returned as of this dictation. EKG: All EKG's are interpreted by the Emergency Department Physician in the absence of a cardiologist.  Please see their note for interpretation of EKG. RADIOLOGY:   Non-plain film images such as CT, Ultrasound and MRI are read by the radiologist. Plain radiographic images are visualized and preliminarily interpreted by the ED Provider with the below findings:        Interpretation per the Radiologist below, if available at the time of this note:    XR CHEST STANDARD (2 VW)   Final Result   Stable chest with cardiomegaly. No acute cardiopulmonary process.                  PROCEDURES   Unless otherwise noted below, none     Procedures    CRITICAL CARE TIME N/A    CONSULTS:  None      EMERGENCY DEPARTMENT COURSE and DIFFERENTIAL DIAGNOSIS/MDM:   Vitals:    Vitals:    07/20/20 2002 07/20/20 2045 07/20/20 2059 07/20/20 2100   BP:    125/68   Pulse: 91 81 89 88   Resp: 28 23 22 24   Temp:       TempSrc:       SpO2: 96% 97% 96%    Weight:       Height:           Patient was given the following medications:  Medications   potassium chloride (KLOR-CON M) extended release tablet 40 mEq (40 mEq Oral Given 7/20/20 2036)           This patient presents to the emergency department with complaints of diarrhea, fatigue, cough, arthralgias in her knees and is primarily concerned about COVID-19. She does report a mild cough however lungs are clear. Chest x-ray is unremarkable. She was found to be slightly hypokalemic. Other lab findings are fairly unremarkable. Abdomen is soft and nontender in all 4 quadrants without pulsatile mass or CVA tenderness. Urine shows infection, treated with antibiotics per my attending.  My suspicion is low for intussusception,acute surgical abdomen, obstruction, perforation, abscess, mesenteric ischemia, AAA, dissection, cholecystitis, cholangitis, pancreatitis, appendicitis, C. diff colitis, diverticulitis, volvulus, incarcerated hernia, necrotizing fasciitis, TOA, ovarian torsion, PID, ectopic pregnancy, izaiah tracy Kadeem syndrome,pyelonephritis, perinephric abscess, kidney stone, urosepsis, fistula , ACS, PE, myocarditis, pericarditis, endocarditis, acute pulmonary edema, pleural effusion, pericardial effusion, cardiac tamponade, cardiomyopathy, CHF exacerbation, thoracic aortic dissection, esophageal rupture, other life-threatening arrhythmia, hypertensive urgency or emergency, hemothorax, pulmonary contusion, subcutaneous emphysema, flail chest, pneumo mediastinum, rib fracture, pneumonia, pneumothorax, carotid dissection, sinus abscess, acute fracture, acute CVA, ICH, SAH, TIA, meningitis, encephalitis, pseudotumor cerebri, temporal arteritis, sentinel bleed from ruptured aneurysm,  subdural hematoma, epidural hematoma, foreign body, tendon rupture, compartment syndrome, acute fracture, dislocation, DVT, arterial compromise or occlusion, limb ischemia, gout, septic joint, abscess, cellulitis, osteomyelitis, or other concerning pathology. She is advised to quarantine until covid19 swab is resulted. We have addressed concerns and expectations. FINAL IMPRESSION      1. Encounter for laboratory testing for COVID-19 virus    2. Fatigue, unspecified type    3. Arthralgia of both knees    4. Diarrhea, unspecified type    5. Cough    6. Hypokalemia    7.  Urinary frequency          DISPOSITION/PLAN   DISPOSITION        PATIENT REFERREDTO:  Radha Hogue MD      for follow up in 1-3 days    [facility] Emergency Department  [address] [facility]  [phone]    If symptoms worsen      DISCHARGE MEDICATIONS:  Discharge Medication List as of 7/20/2020  9:35 PM      START taking these medications    Details   cephALEXin (KEFLEX) 500 MG capsule Take 1 capsule by mouth 2 times daily for 5 days, Disp-10 capsule,R-0Print      ondansetron (ZOFRAN-ODT) 4 MG disintegrating tablet Take 1 tablet by mouth 3 times daily as needed for Nausea or Vomiting, Disp-20 tablet,R-0Print             DISCONTINUED MEDICATIONS:  Discharge Medication List as of 7/20/2020  9:35 PM                 (Please note that portions of this note were completed with a voice recognition program.  Efforts were made to edit the dictations but occasionally words are mis-transcribed.)    Manisha Zarate PA-C (electronically signed)            Manisha Zarate PA-C  07/21/20 4193

## 2020-07-21 ENCOUNTER — CARE COORDINATION (OUTPATIENT)
Dept: CARE COORDINATION | Age: 70
End: 2020-07-21

## 2020-07-21 LAB
EKG ATRIAL RATE: 100 BPM
EKG DIAGNOSIS: NORMAL
EKG P-R INTERVAL: 126 MS
EKG Q-T INTERVAL: 390 MS
EKG QRS DURATION: 80 MS
EKG QTC CALCULATION (BAZETT): 503 MS
EKG R AXIS: -14 DEGREES
EKG T AXIS: 19 DEGREES
EKG VENTRICULAR RATE: 100 BPM
SARS-COV-2, PCR: DETECTED

## 2020-07-21 PROCEDURE — 93010 ELECTROCARDIOGRAM REPORT: CPT | Performed by: INTERNAL MEDICINE

## 2020-07-21 NOTE — CARE COORDINATION
information for Natalia Loop and agrees to enroll yes  Patient's preferred e-mail: [email or web address]   Patient's preferred phone [phone] (M)Based on Loop alert triggers, patient will be contacted by nurse care manager for worsening symptoms. Pt will be further monitored by COVID Loop Team based on severity of symptoms and risk factors. ACM spoke with Nievesla April. She reports that her diarrhea and weakness have improved. She denies fever. She plans to schedule with Dr. David Anaya after waiting a few days to see if her symptoms continue to improve. Encouraged hydration, lactobacillus yogurt and avoidance of spicy, greasy foods. Advised ED is symptoms worsened.

## 2020-07-22 NOTE — ED PROVIDER NOTES
I independently performed a history and physical on Jackie Lynn. All diagnostic, treatment, and disposition decisions were made by myself in conjunction with the advanced practice provider. Briefly, this is a 72 y.o. female here for generalized weakness and fatigue ongoing for the past week. Associated with nonbloody diarrhea. Denies any shortness of breath or chest pain. .    On exam, Patient afebrile and nontoxic. No distress. Heart RRR. Lungs CTAB. Abdomen soft, nondistended, nontender to palpation in all quadrants. EKG  EKG was reviewed by emergency department physician in the absence of a cardiologist    Narrow complex sinus rhythm, rate 100, normal axis, normal [place] and QRS intervals, prolonged Qtc, no ST elevations or depressions, normal t-wave morphology, impression borderline sinus tachycardia with solitary PAC, no STEMI      Screenings            MDM    Patient afebrile and nontoxic. Overall well appearing. EKG no indicative of ischemia or malignant dysrhythmia. Lab workup reassuring, mild hypokalemia noted which was repleted. CXR clear, no increased WOB or hypoxia, no evidence of pneumonia. UA not clearly indicative of infection, will defer treatment pending culture results. Suspect likely viral illness, COVID19 swab obtained. No indication for admission at this time. Patient is felt safe for discharge to self care with close PCP follow up. Strict return precautions discussed. Note that at time of completion of this documentation on 7/22/20 patient's COVID19 swab has returned positive. I have verified that patient is followed by care coordinator and will celeste notified of results.         Patient Referrals:  Ayla Galloway MD      for follow up in 1-3 days    [facility] Emergency Department  [address] [place] [address]  [phone]    If symptoms worsen      Discharge Medications:  Discharge Medication List as of 7/20/2020  9:35 PM      START taking these medications Details   cephALEXin (KEFLEX) 500 MG capsule Take 1 capsule by mouth 2 times daily for 5 days, Disp-10 capsule,R-0Print      ondansetron (ZOFRAN-ODT) 4 MG disintegrating tablet Take 1 tablet by mouth 3 times daily as needed for Nausea or Vomiting, Disp-20 tablet,R-0Print             FINAL IMPRESSION  1. Encounter for laboratory testing for COVID-19 virus    2. Fatigue, unspecified type    3. Arthralgia of both knees    4. Diarrhea, unspecified type    5. Cough    6. Hypokalemia    7. Urinary frequency        Blood pressure 125/68, pulse 88, temperature 98.8 °F (37.1 °C), temperature source Temporal, resp. rate 24, height 5' 4\" (1.626 m), weight 274 lb (124.3 kg), SpO2 96 %. For further details of Amira Bustillo emergency department encounter, please see documentation by advanced practice provider, SHERYL Yin.     Cristel Hutson DO (electronically signed)  Attending Emergency Physician       Cristel Hutson DO  07/22/20 4240

## 2020-07-24 LAB
BLOOD CULTURE, ROUTINE: NORMAL
CULTURE, BLOOD 2: NORMAL

## 2020-08-11 ENCOUNTER — HOSPITAL ENCOUNTER (OUTPATIENT)
Dept: CT IMAGING | Age: 70
Discharge: HOME | End: 2020-08-11
Payer: MEDICARE

## 2020-08-11 PROCEDURE — 72131 CT LUMBAR SPINE W/O DYE: CPT

## 2020-10-28 NOTE — FLOWSHEET NOTE
[address] [facility]       Physical Therapy Treatment Note    Date:  2020    Patient Name:  Yancy Berumen     :  1950  MRN: [id number]  Restrictions/Precautions:    Medical/Treatment Diagnosis Information:  · Diagnosis: Spinal Stenosis M48.061; Lumbar Radiculopathy M54.16  · Treatment Diagnosis: Decreased Trunk and B hip strength limiting Functional transfers/Gait/Ambulation  Insurance/Certification information:  PT Insurance Information: [employer] Medicare $25 copay eval only; Medically necessary visits; Medicare guidelines  Physician Information:  Referring Practitioner: Dr. Stephanie Davila of care signed (Y/N): [x]  Yes []  No signed 10/28/19    Date of Patient follow up with Physician:      Progress Report: []  Yes  [x]  No     Date Range for reporting period:  Beginning: 10/26/2019  Endin19   Progress report due (10 Rx/or 30 days whichever is less):    Vi sit 29    Recertification due (POC duration/ or 90 days whichever is less):      Visit # Insurance Allowable Auth required? Date Range    +  + 3 req [place] []  Yes  [x]  No NA     Latex Allergy:  [x]NO      []YES  Preferred Language for Healthcare:   [x]English       []other:    Functional Scale: Oswestry: 58%  Date assessed:10/26/2019     Oswestry 60%  Date assessed: 19    Pain level:  4/10     SUBJECTIVE:  Pt reports she  Is feeling a little better in her foot region but   She has not scheduled  EMG.    OBJECTIVE:   : Stairs: 6 in steps with heavy use of B HRs and reciprocal pattern    Ambulating with rolling walker and moderate fwd trunk, L LE in ER   LE MMT:   Right:  Left:  Hip Flexion: seated  4  4     Hip IR:    4+  3   Hip ER:   4+  4+  Knee Extension:  4+  4+  Knee Flexion:    4+  4  Ankle DF:   4+  Limited    Balance: NBOS 30 sec; R tandem 30 sec with intermittent tapping; L tandem 30 sec  6 MWT with RW: 670 ft     Observation: 10/30 ambulates with RW Office Procedure  Patient:   Date of Procedure:     Office Procedure: Mitomycin  INTRAVESICAL THERAPY    Surgeon: Kevin Sims MD        PROCEDURE  After fully informed consent, the patient was brought into the procedure room and after patient positioning, the penoscrotal  region  was prepped in a routine fashion. After draping under a sterile field, a 16 Albanian Garcia catheter was inserted into the bladder atraumatically. The bladder was drained and 40 mg of Mitomycin in normal saline was instilled in a closed circuit system intravesically through the Garcia catheter. After instillation the Garcia catheter and reservoir bag were then discarded into to a sealed Biohazard bag.    The patient was then instructed on assuming the supine position for 15 minutes, left lateral position for 15 minutes, prone position of 15 minutes, and right lateral position for another 15 minutes.    CONDITION  He was discharged home after treatment in stable condition without  Complication.    ADDITIONAL INFORMATION  Part or all of this note was dictated and transcribed using Dragon Voice-Activated Software.The below signed provider has make in good tiera attempt to make the appropriate corrections to the document; however, areas of commission, omission, and outright mistakes in working may still exist.      Kevin Sims MD    providing skilled therapy interventions, but not providing any significant amount of measurable one-on-one time to either patient, for improvements in  [x] LE / lumbar: LE, proximal hip, and core control in self care, mobility, lifting, ambulation and eccentric single leg control. [x] UE / cervical: cervical, scapular, scapulothoracic and UE control with self care, reaching, carrying, lifting, house/yardwork, driving, computer work. Therapeutic Activities:  10/26/19 Pt was educated on PT POC, Diagnosis, Prognosis, pathomechanics as well as frequency and duration of scheduling future physical therapy appointments.  Time was also taken on this day to answer all patient questions and participation in PT.   [] (00369 or ) Provided verbal/tactile cueing for activities related to improving balance, coordination, kinesthetic sense, posture, motor skill, proprioception and motor activation to allow for proper function  with self care and ADLs  [] (70863) Provided training and instruction to the patient for proper core and proximal hip recruitment and positioning with ambulation re-education     Home Exercise Program:    [] (70695) Reviewed/Progressed HEP activities related to strengthening, flexibility, endurance, ROM of core, proximal hip and LE for functional self-care, mobility, lifting and ambulation   [] (48908) Reviewed/Progressed HEP activities related to improving balance, coordination, kinesthetic sense, posture, motor skill, proprioception of core, proximal hip and LE for self care, mobility, lifting, and ambulation      Manual Treatments:  PROM / STM / Oscillations-Mobs:  G-I, II, III, IV (PA's, Inf., Post.)  [] (70902) Provided manual therapy to mobilize proximal hip and LS spine soft tissue/joints for the purpose of modulating pain, promoting relaxation,  increasing ROM, reducing/eliminating soft tissue swelling/inflammation/restriction, improving soft tissue extensibility and allowing for proper

## 2020-10-30 ENCOUNTER — HOSPITAL ENCOUNTER (OUTPATIENT)
Dept: WOMENS IMAGING | Age: 70
Discharge: HOME | End: 2020-10-30
Payer: MEDICARE

## 2020-10-30 PROCEDURE — 77063 BREAST TOMOSYNTHESIS BI: CPT

## 2020-11-05 ENCOUNTER — HOSPITAL ENCOUNTER (EMERGENCY)
Age: 70
Discharge: HOME | End: 2020-11-05
Payer: MEDICARE

## 2020-11-05 ENCOUNTER — APPOINTMENT (OUTPATIENT)
Dept: GENERAL RADIOLOGY | Age: 70
End: 2020-11-05
Payer: MEDICARE

## 2020-11-05 VITALS
BODY MASS INDEX: 45.75 KG/M2 | RESPIRATION RATE: 20 BRPM | OXYGEN SATURATION: 96 % | HEIGHT: 64 IN | HEART RATE: 93 BPM | WEIGHT: 268 LBS | TEMPERATURE: 98.2 F | SYSTOLIC BLOOD PRESSURE: 124 MMHG | DIASTOLIC BLOOD PRESSURE: 92 MMHG

## 2020-11-05 PROCEDURE — 99283 EMERGENCY DEPT VISIT LOW MDM: CPT

## 2020-11-05 PROCEDURE — 6370000000 HC RX 637 (ALT 250 FOR IP): Performed by: PHYSICIAN ASSISTANT

## 2020-11-05 PROCEDURE — 73660 X-RAY EXAM OF TOE(S): CPT

## 2020-11-05 PROCEDURE — 28515 TREATMENT OF TOE FRACTURE: CPT

## 2020-11-05 RX ORDER — NAPROXEN 250 MG/1
500 TABLET ORAL ONCE
Status: COMPLETED | OUTPATIENT
Start: 2020-11-05 | End: 2020-11-05

## 2020-11-05 RX ORDER — NAPROXEN 500 MG/1
500 TABLET ORAL 2 TIMES DAILY PRN
Qty: 20 TABLET | Refills: 0 | Status: SHIPPED | OUTPATIENT
Start: 2020-11-05 | End: 2022-01-20 | Stop reason: ALTCHOICE

## 2020-11-05 RX ADMIN — NAPROXEN 500 MG: 250 TABLET ORAL at 16:01

## 2020-11-05 ASSESSMENT — PAIN SCALES - GENERAL
PAINLEVEL_OUTOF10: 6
PAINLEVEL_OUTOF10: 6

## 2020-11-05 ASSESSMENT — ENCOUNTER SYMPTOMS
SHORTNESS OF BREATH: 0
NAUSEA: 0
BACK PAIN: 1
DIARRHEA: 0
VOMITING: 0
ABDOMINAL PAIN: 0
CHEST TIGHTNESS: 0

## 2020-11-05 ASSESSMENT — PAIN DESCRIPTION - PAIN TYPE: TYPE: ACUTE PAIN

## 2020-11-05 ASSESSMENT — PAIN DESCRIPTION - LOCATION: LOCATION: TOE (COMMENT WHICH ONE)

## 2020-11-05 ASSESSMENT — PAIN DESCRIPTION - ORIENTATION: ORIENTATION: LEFT

## 2020-11-05 NOTE — ED PROVIDER NOTES
[address] [facility]        Pt Name: Alvaro Bourne  MRN: [id number]  [place] 1950  Date of evaluation: 11/5/2020  Provider: Arcelia Romero PA-C  PCP: Carmela Yoo MD    ANDREW. I have evaluated this patient. My supervising physician was available for consultation. CHIEF COMPLAINT       Chief Complaint   Patient presents with    Toe Injury     pt tripped over her husbands shoe today and has pain to left 3rd toe       HISTORY OF PRESENT ILLNESS   (Location, Timing/Onset, Context/Setting, Quality, Duration, Modifying Factors, Severity, Associated Signs and Symptoms)  Note limiting factors. Alvaro Bourne is a 72 y.o. female presents the emergency department reports of difficulty as a pertains to her left toes. Patient states that she had an injury accident that occurred early this morning. She accidentally tripped over her 's shoe and had a direct blow over the second and third toe. She states both of those toes of been tender. She states she normally walks with a cane and when she was walking with her cane this morning she noticed increasing pain. She states she took her regular and usual oxycodone that she has for her low back pain and it seemed to help the pain in her foot but she is concerned that possibility that she might have broken toes. Patient states she has had no additional anti-inflammatory medicines. Current level of pain and discomfort rates to be 6 out of 10. She reports he is not currently anticoagulated. She has no allergies. She states this is an isolated complaint over the left toes. She states the skin is intact. She has no additional complaints or concerns and presents to the ED for evaluation and treatment. Nursing Notes were all reviewed and agreed with or any disagreements were addressed in the HPI.     REVIEW OF SYSTEMS    (2-9 systems for level 4, 10 or more for level 5)     Review of Systems   Constitutional: Negative for activity change, chills and fever. Respiratory: Negative for chest tightness and shortness of breath. Cardiovascular: Negative for chest pain. Gastrointestinal: Negative for abdominal pain, diarrhea, nausea and vomiting. Genitourinary: Negative for dysuria and flank pain. Musculoskeletal: Positive for arthralgias, back pain and gait problem. Neurological: Negative for numbness. Positives and Pertinent negatives as per HPI. Except as noted above in the ROS, all other systems were reviewed and negative. PAST MEDICAL HISTORY     Past Medical History:   Diagnosis Date    Arthritis     Back pain     Colon polyps     Fibromyalgia     Gout     Hiatal hernia     Hyperlipidemia     Hypertension     Peripheral neuropathy     Spondylolisthesis, lumbar region     Thyroid disease          SURGICAL HISTORY     Past Surgical History:   Procedure Laterality Date    CARPAL TUNNEL RELEASE      and nerve surgery    COLONOSCOPY  9/26/2019    COLONOSCOPY POLYPECTOMY SNARE/COLD BIOPSY performed by Yvette Martins MD at [address] [place] [address], COLON, DIAGNOSTIC      HYSTERECTOMY      LUMBAR FUSION N/A 5/13/2019    L2-3, L3-4 LATERAL LUMBAR INTERBODY FUSION performed by Waylon Benson MD at [facility] N/A 5/13/2019    L4-5 TRANSFORAMINAL LUMBAR INTERBODY FUSION WITH PEDICLE SCREW performed by Waylon Benson MD at [facility]        Previous Medications    ALLOPURINOL (ZYLOPRIM) 100 MG TABLET    Take 100 mg by mouth daily    AMLODIPINE (NORVASC) 10 MG TABLET    Take 10 mg by mouth daily. ATORVASTATIN (LIPITOR) 10 MG TABLET    Take 10 mg by mouth daily    CHOLECALCIFEROL (VITAMIN D3) 1000 UNITS CAPS    Take 1 capsule by mouth daily    FUROSEMIDE (LASIX) 80 MG TABLET    Take 80 mg by mouth daily. GABAPENTIN (NEURONTIN) 600 MG TABLET    Take 600 mg by mouth 3 times daily as needed. LATANOPROST (XALATAN) 0.005 % OPHTHALMIC SOLUTION    1 drop nightly    LEVOTHYROXINE (SYNTHROID) 150 MCG TABLET    Take 150 mcg by mouth Daily. METOPROLOL (LOPRESSOR) 100 MG TABLET    Take 100 mg by mouth daily    MULTIPLE VITAMINS-MINERALS (THERAPEUTIC MULTIVITAMIN-MINERALS) TABLET    Take 1 tablet by mouth daily    NIACIN (VITAMIN B-3 PO)    Take by mouth    ONDANSETRON (ZOFRAN-ODT) 4 MG DISINTEGRATING TABLET    Take 1 tablet by mouth 3 times daily as needed for Nausea or Vomiting    OXYCODONE-ACETAMINOPHEN (PERCOCET) 5-325 MG PER TABLET    Take 1 tablet by mouth 2 times daily. POTASSIUM CHLORIDE (KLOR-CON M) 20 MEQ EXTENDED RELEASE TABLET    Take 20 mEq by mouth daily    TAMSULOSIN (FLOMAX) 0.4 MG CAPSULE    Take 2 capsules by mouth daily for 7 days         ALLERGIES     Lisinopril    FAMILYHISTORY       Family History   Problem Relation Age of Onset    Diabetes Mother           SOCIAL HISTORY       Social History     Tobacco Use    Smoking status: Never Smoker    Smokeless tobacco: Never Used   Substance Use Topics    Alcohol use: No    Drug use: No       SCREENINGS             PHYSICAL EXAM    (up to 7 for level 4, 8 or more for level 5)     ED Triage Vitals [11/05/20 1531]   BP Temp Temp Source Pulse Resp SpO2 Height Weight   (!) 124/92 98.2 °F (36.8 °C) Oral 93 20 96 % 5' 4\" (1.626 m) 268 lb (121.6 kg)       Physical Exam  Vitals signs and nursing note reviewed. Constitutional:       General: She is awake. She is not in acute distress. Appearance: Normal appearance. She is well-developed. She is not ill-appearing or diaphoretic. HENT:      Head: Normocephalic and atraumatic. Right Ear: External ear normal.      Left Ear: External ear normal.   Eyes:      General: No scleral icterus. Right eye: No discharge. Left eye: No discharge. Conjunctiva/sclera: Conjunctivae normal.   Neck:      Musculoskeletal: Normal range of motion. Vascular: No JVD. Cardiovascular:      Rate and Rhythm: Normal rate and regular rhythm. Heart sounds: No murmur. No friction rub. No gallop. Pulmonary:      Effort: Pulmonary effort is normal. No accessory muscle usage or respiratory distress. Breath sounds: Normal breath sounds. No wheezing, rhonchi or rales. Abdominal:      General: There is no distension. Palpations: Abdomen is soft. Abdomen is not rigid. There is no mass. Tenderness: There is no abdominal tenderness. There is no guarding or rebound. Musculoskeletal:      Left foot: Normal range of motion and normal capillary refill. Tenderness, bony tenderness and swelling present. No crepitus, deformity or laceration. Feet:    Skin:     General: Skin is warm and dry. Neurological:      Mental Status: She is alert and oriented to person, place, and time. GCS: GCS eye subscore is 4. GCS verbal subscore is 5. GCS motor subscore is 6. Cranial Nerves: No cranial nerve deficit. Sensory: No sensory deficit. Coordination: Coordination normal.   Psychiatric:         Behavior: Behavior normal. Behavior is cooperative. DIAGNOSTIC RESULTS   LABS:    Labs Reviewed - No data to display    All other labs were within normal range or not returned as of this dictation. EKG: All EKG's are interpreted by the Emergency Department Physician in the absence of a cardiologist.  Please see their note for interpretation of EKG. RADIOLOGY:   Non-plain film images such as CT, Ultrasound and MRI are read by the radiologist. Plain radiographic images are visualized and preliminarily interpreted by the ED Provider with the below findings:        Interpretation per the Radiologist below, if available at the time of this note:    XR TOE LEFT (MIN 2 VIEWS)   Final Result   Acute, nondisplaced fracture of the diaphysis of the proximal phalanx of the   3rd toe.            Kirt See Digital Screen Bilateral    Result Date: 10/30/2020  EXAMINATION: SCREENING DIGITAL BILATERAL  MAMMOGRAM WITH TOMOSYNTHESIS, 10/30/2020 TECHNIQUE: Screening mammography was performed with tomosynthesis including MLO and CC views of the bilateral breasts. Computer aided detection was used for the interpretation of this exam. COMPARISON: Prior mammograms in 2019 and 2018 and 2014-15 HISTORY: Screening. FINDINGS: There are scattered areas of fibroglandular density. No new suspicious masses, microcalcifications, or areas of architectural distortion are identified. Stable small asymmetry is noted in the medial right breast. Stable small nodular asymmetry is noted in the lateral left breast.  Stable calcifications are noted in the left breast.  Prominent left axillary lymph nodes demonstrate long-term stability with symmetric nodes noted on the right. Stable mammogram with benign findings. BIRADS: BIRADS - CATEGORY 2 Benign Findings. Normal interval follow-up is recommended in 12 months. OVERALL ASSESSMENT - BENIGN A letter of notification will be sent to the patient regarding the results. The [employer] recommends annual mammograms for women 40 years and older. PROCEDURES   Unless otherwise noted below, none     Ortho Injury    Date/Time: 11/5/2020 4:03 PM  Performed by: Yariel Webber PA-C  Authorized by: Yariel Webber PA-C   Consent: Verbal consent obtained.   Patient identity confirmed: verbally with patient  Injury location: toe  Location details: left third toe  Injury type: fracture  Fracture type: proximal phalanx  Pre-procedure neurovascular assessment: neurovascularly intact  Pre-procedure distal perfusion: normal  Pre-procedure neurological function: normal  Pre-procedure range of motion: reduced    Anesthesia:  Local anesthesia used: no  Manipulation performed: no  Immobilization: tape  Post-procedure neurovascular assessment: post-procedure neurovascularly intact  Post-procedure distal perfusion: normal  Post-procedure neurological function: normal  Post-procedure range of motion: unchanged  Patient tolerance: Patient tolerated the procedure well with no immediate complications  Comments: Definitive fracture care for the patient's proximal phalanx left closed toe fracture completed. Have independently reviewed the radiographs and provided her with definitive fracture care. Medications for the home environment follow-up as needed. CRITICAL CARE TIME   N/A    CONSULTS:  None      EMERGENCY DEPARTMENT COURSE and DIFFERENTIAL DIAGNOSIS/MDM:   Vitals:    Vitals:    11/05/20 1531   BP: (!) 124/92   Pulse: 93   Resp: 20   Temp: 98.2 °F (36.8 °C)   TempSrc: Oral   SpO2: 96%   Weight: 268 lb (121.6 kg)   Height: 5' 4\" (1.626 m)       Patient was given the following medications:  Medications   naproxen (NAPROSYN) tablet 500 mg (500 mg Oral Given 11/5/20 1601)           The patient's detailed history of present illness is documented as above. Upon arrival to the emergency department the patient's vital signs are as documented. The patient is noted to be hemodynamically stable and afebrile. Physical examination findings are as above. Patient was placed on Naprosyn. She is already had Percocet today. Have suggested buddy taping and ongoing care management on an outpatient basis. She is aware the definitive fracture care was completed here in the emergency department today has been given follow-up with Dr. Marquis Candelaria as needed. The patient has been made aware of the signs and symptoms which would necessitate an immediate return to the emergency department and verbalizes an understanding of these signs and symptoms. I estimate there is low risk for compartment syndrome, deep venous thrombosis, limb-threatening infectious, tendon and/or neurovascular injury and therefore I consider the discharge disposition reasonable.  Jean Pierre Morales and I have discussed the diagnosis and risks, and we agree with discharging home to follow-up with their primary care provider and/or the referring orthopedist on call. We also discussed returning to the emergency department immediately if new or worsening symptoms occur. We have discussed the symptoms which are most concerning (e.g., changing or worsening pain, numbness, weakness) that necessitate immediate return. FINAL IMPRESSION      1. Closed fracture of proximal phalanx of lesser toe of left foot, initial encounter    2.  Opiate dependence, continuous ([address]          DISPOSITION/PLAN   DISPOSITION: Discharged to home      PATIENT REFERREDTO:  Rae Haro MD      As needed    [facility] Emergency Department  [address] [facility]  [phone]    If symptoms worsen    Cuauhtemoc Ramsey MD  [address] [address]  [phone]    Schedule an appointment as soon as possible for a visit   If you are still having significant pain and discomfort in a couple of weeks      DISCHARGE MEDICATIONS:  New Prescriptions    NAPROXEN (NAPROSYN) 500 MG TABLET    Take 1 tablet by mouth 2 times daily as needed for Pain       DISCONTINUED MEDICATIONS:  Discontinued Medications    No medications on file              (Please note that portions of this note were completed with a voice recognition program.  Efforts were made to edit the dictations but occasionally words are mis-transcribed.)    Lazaro Hood PA-C (electronically signed)                Leonardo Cooney PA-C  11/05/20 1151

## 2020-11-05 NOTE — ED NOTES
Sushil tape to 3ed and 4th toe. Discharge instructions given with verbal understanding and one prescription.  Pt taken to lobby via wheelchair     Deandre Coppola RN  11/05/20 7675

## 2020-11-06 ENCOUNTER — TELEPHONE (OUTPATIENT)
Dept: ORTHOPEDIC SURGERY | Age: 70
End: 2020-11-06

## 2020-11-06 ENCOUNTER — OFFICE VISIT (OUTPATIENT)
Dept: ORTHOPEDIC SURGERY | Age: 70
End: 2020-11-06
Payer: MEDICARE

## 2020-11-06 VITALS — HEIGHT: 64 IN | WEIGHT: 268 LBS | BODY MASS INDEX: 45.75 KG/M2 | TEMPERATURE: 97.2 F

## 2020-11-06 PROCEDURE — 99203 OFFICE O/P NEW LOW 30 MIN: CPT | Performed by: ORTHOPAEDIC SURGERY

## 2020-11-06 PROCEDURE — 28510 TREATMENT OF TOE FRACTURE: CPT | Performed by: ORTHOPAEDIC SURGERY

## 2020-11-06 NOTE — TELEPHONE ENCOUNTER
Called and left voicemail for patient. Patient referred to Dr. Kayley Schreiber from ED for toe injury. Upon return call, patient can be scheduled today 11/6.

## 2020-11-08 PROBLEM — M21.372 FOOT DROP, LEFT: Status: ACTIVE | Noted: 2020-11-08

## 2020-11-08 PROBLEM — S92.515A CLOSED NONDISPLACED FRACTURE OF PROXIMAL PHALANX OF LESSER TOE OF LEFT FOOT: Status: ACTIVE | Noted: 2020-11-08

## 2020-11-09 NOTE — PROGRESS NOTES
CHIEF COMPLAINT:  1- Left foot pain/ third toe proximal phalanx minimally displaced fracture. 2- Left foot drop. DATE OF INJURY:  11/5/2020    HISTORY:  Ms. Morris Bolaños 72 y.o.  [place] female presents today for the first visit for evaluation of a left foot injury which occurred when she fell after stepping on a shoe. She is complaining of left foot third toe pain and swelling. This is better with elevation and worse with bearing any wt. The pain is sharp and not radiating. No other complaint. She was seen 1st at [facility], where she was x-rayed and splinted and asked to f/u with orthopaedics. She had a back surgery 2018 with a foot drop, where she used AFO, but did not like it and has been walking without a brace.     Past Medical History:   Diagnosis Date    Arthritis     Back pain     Colon polyps     Fibromyalgia     Gout     Hiatal hernia     Hyperlipidemia     Hypertension     Peripheral neuropathy     Spondylolisthesis, lumbar region     Thyroid disease        Past Surgical History:   Procedure Laterality Date    CARPAL TUNNEL RELEASE      and nerve surgery    COLONOSCOPY  9/26/2019    COLONOSCOPY POLYPECTOMY SNARE/COLD BIOPSY performed by [place] MD at [address] [place] [address] COLON, DIAGNOSTIC      HYSTERECTOMY      LUMBAR FUSION N/A 5/13/2019    L2-3, L3-4 LATERAL LUMBAR INTERBODY FUSION performed by Anjel Rankin MD at [facility] N/A 5/13/2019    L4-5 TRANSFORAMINAL LUMBAR INTERBODY FUSION WITH PEDICLE SCREW performed by Anjel Rankin MD at [facility] History     Socioeconomic History    Marital status:      Spouse name: Not on file    Number of children: Not on file    Years of education: Not on file    Highest education level: Not on file   Occupational History    Not on file   Social Needs    Financial resource strain: Not on file    Food insecurity     Worry: Not on file     Inability: Not on file    Transportation needs     Medical: Not on file     Non-medical: Not on file   Tobacco Use    Smoking status: Never Smoker    Smokeless tobacco: Never Used   Substance and Sexual Activity    Alcohol use: No    Drug use: No    Sexual activity: Not on file   Lifestyle    Physical activity     Days per week: Not on file     Minutes per session: Not on file    Stress: Not on file   Relationships    Social connections     Talks on phone: Not on file     Gets together: Not on file     Attends [employer] service: Not on file     Active member of club or organization: Not on file     Attends meetings of clubs or organizations: Not on file     Relationship status: Not on file    Intimate partner violence     Fear of current or ex partner: Not on file     Emotionally abused: Not on file     Physically abused: Not on file     Forced sexual activity: Not on file   Other Topics Concern    Not on file   Social History Narrative    Not on file       Family History   Problem Relation Age of Onset    Diabetes Mother        Current Outpatient Medications on File Prior to Visit   Medication Sig Dispense Refill    naproxen (NAPROSYN) 500 MG tablet Take 1 tablet by mouth 2 times daily as needed for Pain 20 tablet 0    ondansetron (ZOFRAN-ODT) 4 MG disintegrating tablet Take 1 tablet by mouth 3 times daily as needed for Nausea or Vomiting 20 tablet 0    Multiple Vitamins-Minerals (THERAPEUTIC MULTIVITAMIN-MINERALS) tablet Take 1 tablet by mouth daily      allopurinol (ZYLOPRIM) 100 MG tablet Take 100 mg by mouth daily      Niacin (VITAMIN B-3 PO) Take by mouth      oxyCODONE-acetaminophen (PERCOCET) 5-325 MG per tablet Take 1 tablet by mouth 2 times daily.       latanoprost (XALATAN) 0.005 % ophthalmic solution 1 drop nightly      Cholecalciferol (VITAMIN D3) 1000 units CAPS Take 1 capsule by mouth daily      tamsulosin (FLOMAX) 0.4 MG capsule Take 2 capsules by mouth daily for 7 days 14 capsule 0    atorvastatin (LIPITOR) 10 MG tablet Take 10 mg by mouth daily      potassium chloride (KLOR-CON M) 20 MEQ extended release tablet Take 20 mEq by mouth daily      metoprolol (LOPRESSOR) 100 MG tablet Take 100 mg by mouth daily      gabapentin (NEURONTIN) 600 MG tablet Take 600 mg by mouth 3 times daily as needed.  levothyroxine (SYNTHROID) 150 MCG tablet Take 150 mcg by mouth Daily.  furosemide (LASIX) 80 MG tablet Take 80 mg by mouth daily.  amLODIPine (NORVASC) 10 MG tablet Take 10 mg by mouth daily. No current facility-administered medications on file prior to visit. Pertinent items are noted in HPI  Review of systems reviewed from Patient History Form dated on 11/6/2020 and available in the patient's chart under the Media tab. No change noted. PHYSICAL EXAMINATION:  Ms. Rebecca Borrero is a very pleasant 72 y.o.  female who presents today in no acute distress, awake, alert, and oriented. She is well dressed, nourished and  groomed. Patient with normal affect. Height is  5' 4\" (1.626 m), weight is 268 lb (121.6 kg), Body mass index is 46 kg/m². Resting respiratory rate is 16. Examination of the gait, showed that the patient walks with a limp and a steppage gait from left foot drop in regular shoe, WB left leg. Examination of both feet and ankles showing a decreased range of motion of the left foot third toe compare to the other side because of pain. There is moderate swelling that can be seen, as well as ecchymosis over third toe of the left foot. She has intact sensation and good pedal pulses. She has significant tenderness on deep palpation over the third toe proximal phalanx left foot. Left foot drop. IMAGING: Genny Bonilla were reviewed, dated 11/5/2020, 3 views of the left foot, and showed a third toe proximal phalanx minimally displaced fracture. IMPRESSION: Left foot third toe proximal phalanx minimally displaced fracture.     PLAN: I discussed that the overall alignment of this fracture is good and that we can try to treat this non-operatively in a post-op shoe with full WB and strapping. We discussed the risk of nonunion and  malunion. We will see her  back in 6 weeks at which time we will get a new xray of the left foot. We recommended AFO brace for her foot drop but she declined to use. PROCEDURE:    With the patient's permission, the left second and third toes were strapped and tapped with coband. The patient tolerated the procedure well.        Ruy Mratel MD

## 2020-12-18 ENCOUNTER — OFFICE VISIT (OUTPATIENT)
Dept: ORTHOPEDIC SURGERY | Age: 70
End: 2020-12-18
Payer: MEDICARE

## 2020-12-18 VITALS — WEIGHT: 268 LBS | BODY MASS INDEX: 45.75 KG/M2 | HEIGHT: 64 IN | TEMPERATURE: 97.6 F

## 2020-12-18 PROCEDURE — 99214 OFFICE O/P EST MOD 30 MIN: CPT | Performed by: ORTHOPAEDIC SURGERY

## 2020-12-18 PROCEDURE — 99024 POSTOP FOLLOW-UP VISIT: CPT | Performed by: ORTHOPAEDIC SURGERY

## 2020-12-18 RX ORDER — NAPROXEN 500 MG/1
500 TABLET ORAL 2 TIMES DAILY WITH MEALS
Qty: 60 TABLET | Refills: 0 | Status: SHIPPED | OUTPATIENT
Start: 2020-12-18 | End: 2022-01-20 | Stop reason: ALTCHOICE

## 2020-12-18 NOTE — PROGRESS NOTES
CHIEF COMPLAINT:  1- Left foot pain/ third toe proximal phalanx minimally displaced fracture. 2- Left foot drop. 3- Left shoulder pain/ rotator cuff strain-new    DATE OF INJURY:  11/5/2020    HISTORY:  Ms. Heike Banuelos 72 y.o.  [place] female presents today for follow up visit for evaluation of a left foot injury which occurred when she fell after stepping on a shoe. She is complaining of left foot third toe pain and swelling that is improving. This is better with elevation and worse with bearing any wt. Rates pain a 3/10 VAS. The pain is mild tender and not radiating. No other complaint. She was seen 1st at [facility], where she was x-rayed and splinted and asked to f/u with orthopaedics. She had a back surgery 2018 with a foot drop, where she used AFO, but did not like it and has been walking without a brace. She is also here for new c/o left shoulder pain that started 3 weeks ago and is worsening. Pain is in entire left shoulder and is worse with overhead activity and better with rest. She denies injury or trauma. She has had prior treatment for the left shoulder and had a Cortisone injection by her PCP in 1/2020 with some improvement, but then the pain returned.      Past Medical History:   Diagnosis Date    Arthritis     Back pain     Colon polyps     Fibromyalgia     Gout     Hiatal hernia     Hyperlipidemia     Hypertension     Peripheral neuropathy     Spondylolisthesis, lumbar region     Thyroid disease        Past Surgical History:   Procedure Laterality Date    CARPAL TUNNEL RELEASE      and nerve surgery    COLONOSCOPY  9/26/2019    COLONOSCOPY POLYPECTOMY SNARE/COLD BIOPSY performed by Rosenda Stover MD at [address], COLON, DIAGNOSTIC      HYSTERECTOMY      LUMBAR FUSION N/A 5/13/2019    L2-3, L3-4 LATERAL LUMBAR INTERBODY FUSION performed by Lucyann Severe, MD at [address] [facility] N/A 5/13/2019  allopurinol (ZYLOPRIM) 100 MG tablet Take 100 mg by mouth daily      Niacin (VITAMIN B-3 PO) Take by mouth      oxyCODONE-acetaminophen (PERCOCET) 5-325 MG per tablet Take 1 tablet by mouth 2 times daily.  latanoprost (XALATAN) 0.005 % ophthalmic solution 1 drop nightly      Cholecalciferol (VITAMIN D3) 1000 units CAPS Take 1 capsule by mouth daily      atorvastatin (LIPITOR) 10 MG tablet Take 10 mg by mouth daily      potassium chloride (KLOR-CON M) 20 MEQ extended release tablet Take 20 mEq by mouth daily      metoprolol (LOPRESSOR) 100 MG tablet Take 100 mg by mouth daily      gabapentin (NEURONTIN) 600 MG tablet Take 600 mg by mouth 3 times daily as needed.  levothyroxine (SYNTHROID) 150 MCG tablet Take 150 mcg by mouth Daily.  furosemide (LASIX) 80 MG tablet Take 80 mg by mouth daily.  amLODIPine (NORVASC) 10 MG tablet Take 10 mg by mouth daily.  naproxen (NAPROSYN) 500 MG tablet Take 1 tablet by mouth 2 times daily as needed for Pain 20 tablet 0    tamsulosin (FLOMAX) 0.4 MG capsule Take 2 capsules by mouth daily for 7 days 14 capsule 0     No current facility-administered medications on file prior to visit. Pertinent items are noted in HPI  Review of systems reviewed from Patient History Form dated on 11/6/2020 and available in the patient's chart under the Media tab. No change noted. PHYSICAL EXAMINATION:  Ms. Mina Arvizu is a very pleasant 72 y.o.  female who presents today in no acute distress, awake, alert, and oriented. She is well dressed, nourished and  groomed. Patient with normal affect. Height is  5' 4\" (1.626 m), weight is 268 lb (121.6 kg), Body mass index is 46 kg/m². Resting respiratory rate is 16. Examination of the gait, showed that the patient walks with a limp and a steppage gait from left foot drop in regular shoe, WB left leg using a walker. Examination of both feet and ankles showing a decreased range of motion of the left foot third toe with mild swelling that can be seen, no ecchymosis over third toe of the left foot. She has intact sensation and good pedal pulses. She has mild tenderness on deep palpation over the third toe proximal phalanx left foot. Left foot drop. On evaluation of the left shoulder, range of motion is 0-160 degree in flexion and 0-140 degree in abduction. There is positive impingement signs. Motor and sensation is intact and symmetric throughout the bilateral upper extremities in the median, ulnar and radial nerve distributions. She has 2+ radial pulses bilaterally. IMAGING: Emma Morenita were reviewed, dated today in office, 3 views of the left foot, and showed a third toe proximal phalanx minimally displaced fracture, healing well. X-rays were taken in the office today, 3 views of the left shoulder, and showed no acute fracture. Mild cuff arthropathy. IMPRESSION:   1-Left foot third toe proximal phalanx minimally displaced fracture. 2-Left foot drop. 3-Left shoulder pain/ rotator cuff strain-new    PLAN: For the foot:  I discussed that the overall alignment of this fracture is good. She can be full WB as tolerated. We will see her  back in 2 months PRN at which time we will get a new xray of the left foot. We recommended AFO brace for her foot drop but she declined to use. For the shoulder: I discussed with the patient the treatment options including both surgical and non-surgical treatment. We recommended stretching exercises of the shoulder was taught to the patient today. She will take NSAIDS Naprosyn. Follow up in 6 weeks. If her pain is not improved, she may benefit from Cortisone injection or PT.         Arabella Moon MD

## 2020-12-22 ENCOUNTER — TELEPHONE (OUTPATIENT)
Dept: ORTHOPEDIC SURGERY | Age: 70
End: 2020-12-22

## 2020-12-22 NOTE — TELEPHONE ENCOUNTER
Spoke with patient concerning her left foot and left shoulder. Patient had questions regarding her plan of care. Patient was informed that she can be full WBAT on her left foot and will follow up in 2 months with a new xray of left foot. Patient was also informed that for the left shoulder she will use the recommended stretches and exercises and will follow up in 6 weeks. An appt was made for 01.28.2020 at the [facility] location per patients request. Patient understood and will call back if needed.

## 2021-02-23 ENCOUNTER — OFFICE VISIT (OUTPATIENT)
Dept: ORTHOPEDIC SURGERY | Age: 71
End: 2021-02-23
Payer: MEDICARE

## 2021-02-23 VITALS
TEMPERATURE: 97.5 F | DIASTOLIC BLOOD PRESSURE: 78 MMHG | WEIGHT: 268 LBS | SYSTOLIC BLOOD PRESSURE: 132 MMHG | BODY MASS INDEX: 45.75 KG/M2 | HEIGHT: 64 IN

## 2021-02-23 DIAGNOSIS — M21.372 FOOT DROP, LEFT: ICD-10-CM

## 2021-02-23 DIAGNOSIS — S92.515A CLOSED NONDISPLACED FRACTURE OF PROXIMAL PHALANX OF LESSER TOE OF LEFT FOOT, INITIAL ENCOUNTER: Primary | ICD-10-CM

## 2021-02-23 PROCEDURE — 99213 OFFICE O/P EST LOW 20 MIN: CPT | Performed by: ORTHOPAEDIC SURGERY

## 2021-02-26 NOTE — PROGRESS NOTES
CHIEF COMPLAINT:  1- Left foot pain/ third toe proximal phalanx minimally displaced fracture. 2- Left foot drop. 3- Left shoulder pain/ rotator cuff strain-better    DATE OF INJURY:  11/5/2020    HISTORY:  Ms. Aleks Araya 72 y.o.  [place] female presents today for follow up visit for evaluation of a left foot injury which occurred when she fell after stepping on a shoe. She is still complaining of left foot third toe pain and swelling that is improving. This is better with elevation and worse with bearing any wt. Rates pain a 5/10 VAS. The pain is mild tender and not radiating. No other complaint. She was seen 1st at [facility], where she was x-rayed and splinted and asked to f/u with orthopaedics. She had a back surgery 2018 with a foot drop, where she used AFO, but did not like it and has been walking without a brace. She is also here for new c/o left shoulder pain that started 3 weeks ago and is worsening. Pain is in entire left shoulder and is worse with overhead activity and better with rest. She denies injury or trauma. She has had prior treatment for the left shoulder and had a Cortisone injection by her PCP in Jan/2020 with some improvement, but then the pain returned, and now improved.      Past Medical History:   Diagnosis Date    Arthritis     Back pain     Colon polyps     Fibromyalgia     Gout     Hiatal hernia     Hyperlipidemia     Hypertension     Peripheral neuropathy     Spondylolisthesis, lumbar region     Thyroid disease        Past Surgical History:   Procedure Laterality Date    CARPAL TUNNEL RELEASE      and nerve surgery    COLONOSCOPY  9/26/2019    COLONOSCOPY POLYPECTOMY SNARE/COLD BIOPSY performed by Vamsi Crane MD at [address], COLON, DIAGNOSTIC      HYSTERECTOMY      LUMBAR FUSION N/A 5/13/2019    L2-3, L3-4 LATERAL LUMBAR INTERBODY FUSION performed by Hector Mullins MD at [facility] N/A 5/13/2019 L4-5 TRANSFORAMINAL LUMBAR INTERBODY FUSION WITH PEDICLE SCREW performed by rKis Quiros MD at [facility] History     Socioeconomic History    Marital status:      Spouse name: Not on file    Number of children: Not on file    Years of education: Not on file    Highest education level: Not on file   Occupational History    Not on file   Social Needs    Financial resource strain: Not on file    Food insecurity     Worry: Not on file     Inability: Not on file    Transportation needs     Medical: Not on file     Non-medical: Not on file   Tobacco Use    Smoking status: Never Smoker    Smokeless tobacco: Never Used   Substance and Sexual Activity    Alcohol use: No    Drug use: No    Sexual activity: Not on file   Lifestyle    Physical activity     Days per week: Not on file     Minutes per session: Not on file    Stress: Not on file   Relationships    Social connections     Talks on phone: Not on file     Gets together: Not on file     Attends Mandaeism service: Not on file     Active member of club or organization: Not on file     Attends meetings of clubs or organizations: Not on file     Relationship status: Not on file    Intimate partner violence     Fear of current or ex partner: Not on file     Emotionally abused: Not on file     Physically abused: Not on file     Forced sexual activity: Not on file   Other Topics Concern    Not on file   Social History Narrative    Not on file       Family History   Problem Relation Age of Onset    Diabetes Mother        Current Outpatient Medications on File Prior to Visit   Medication Sig Dispense Refill    ondansetron (ZOFRAN-ODT) 4 MG disintegrating tablet Take 1 tablet by mouth 3 times daily as needed for Nausea or Vomiting 20 tablet 0    Multiple Vitamins-Minerals (THERAPEUTIC MULTIVITAMIN-MINERALS) tablet Take 1 tablet by mouth daily  allopurinol (ZYLOPRIM) 100 MG tablet Take 100 mg by mouth daily      Niacin (VITAMIN B-3 PO) Take by mouth      oxyCODONE-acetaminophen (PERCOCET) 5-325 MG per tablet Take 1 tablet by mouth 2 times daily.  latanoprost (XALATAN) 0.005 % ophthalmic solution 1 drop nightly      Cholecalciferol (VITAMIN D3) 1000 units CAPS Take 1 capsule by mouth daily      atorvastatin (LIPITOR) 10 MG tablet Take 10 mg by mouth daily      potassium chloride (KLOR-CON M) 20 MEQ extended release tablet Take 20 mEq by mouth daily      metoprolol (LOPRESSOR) 100 MG tablet Take 100 mg by mouth daily      gabapentin (NEURONTIN) 600 MG tablet Take 600 mg by mouth 3 times daily as needed.  levothyroxine (SYNTHROID) 150 MCG tablet Take 150 mcg by mouth Daily.  furosemide (LASIX) 80 MG tablet Take 80 mg by mouth daily.  amLODIPine (NORVASC) 10 MG tablet Take 10 mg by mouth daily.  naproxen (NAPROSYN) 500 MG tablet Take 1 tablet by mouth 2 times daily (with meals) 60 tablet 0    naproxen (NAPROSYN) 500 MG tablet Take 1 tablet by mouth 2 times daily as needed for Pain 20 tablet 0    tamsulosin (FLOMAX) 0.4 MG capsule Take 2 capsules by mouth daily for 7 days 14 capsule 0     No current facility-administered medications on file prior to visit. Pertinent items are noted in HPI  Review of systems reviewed from Patient History Form dated on 11/6/2020 and available in the patient's chart under the Media tab. No change noted. PHYSICAL EXAMINATION:  Ms. Eveline oPnce is a very pleasant 72 y.o.  female who presents today in no acute distress, awake, alert, and oriented. She is well dressed, nourished and  groomed. Patient with normal affect. Height is  5' 4\" (1.626 m), weight is 268 lb (121.6 kg), Body mass index is 46 kg/m². Resting respiratory rate is 16. For the shoulder: I discussed with the patient the treatment options including both surgical and non-surgical treatment. We recommended stretching exercises of the shoulder was taught to the patient today. She will take NSAIDS Naprosyn. Follow up PRN. If her pain is not improved, she may benefit from Cortisone injection or PT.         Jose Mccall MD

## 2021-03-01 ENCOUNTER — HOSPITAL ENCOUNTER (OUTPATIENT)
Age: 71
Discharge: HOME | End: 2021-03-01
Payer: MEDICARE

## 2021-03-01 LAB
BASOPHILS ABSOLUTE: 0.1 K/UL (ref 0–0.2)
BASOPHILS RELATIVE PERCENT: 0.5 %
EOSINOPHILS ABSOLUTE: 0.1 K/UL (ref 0–0.6)
EOSINOPHILS RELATIVE PERCENT: 0.8 %
HCT VFR BLD CALC: 34.4 % (ref 36–48)
HEMOGLOBIN: 11 G/DL (ref 12–16)
LYMPHOCYTES ABSOLUTE: 4.1 K/UL (ref 1–5.1)
LYMPHOCYTES RELATIVE PERCENT: 36.3 %
MCH RBC QN AUTO: 23.6 PG (ref 26–34)
MCHC RBC AUTO-ENTMCNC: 31.8 G/DL (ref 31–36)
MCV RBC AUTO: 74 FL (ref 80–100)
MONOCYTES ABSOLUTE: 0.8 K/UL (ref 0–1.3)
MONOCYTES RELATIVE PERCENT: 6.7 %
NEUTROPHILS ABSOLUTE: 6.2 K/UL (ref 1.7–7.7)
NEUTROPHILS RELATIVE PERCENT: 55.7 %
PDW BLD-RTO: 16 % (ref 12.4–15.4)
PLATELET # BLD: 282 K/UL (ref 135–450)
PMV BLD AUTO: 8.4 FL (ref 5–10.5)
RBC # BLD: 4.65 M/UL (ref 4–5.2)
T4 TOTAL: 9.8 UG/DL (ref 4.5–10.9)
TOTAL CK: 348 U/L (ref 26–192)
TSH SERPL DL<=0.05 MIU/L-ACNC: 0.44 UIU/ML (ref 0.27–4.2)
VITAMIN B-12: 639 PG/ML (ref 211–911)
WBC # BLD: 11.2 K/UL (ref 4–11)

## 2021-03-01 PROCEDURE — 36415 COLL VENOUS BLD VENIPUNCTURE: CPT

## 2021-03-01 PROCEDURE — 84436 ASSAY OF TOTAL THYROXINE: CPT

## 2021-03-01 PROCEDURE — 85025 COMPLETE CBC W/AUTO DIFF WBC: CPT

## 2021-03-01 PROCEDURE — 82607 VITAMIN B-12: CPT

## 2021-03-01 PROCEDURE — 84443 ASSAY THYROID STIM HORMONE: CPT

## 2021-03-01 PROCEDURE — 82550 ASSAY OF CK (CPK): CPT

## 2021-06-03 ENCOUNTER — APPOINTMENT (OUTPATIENT)
Dept: MRI IMAGING | Age: 71
End: 2021-06-03
Payer: MEDICARE

## 2021-06-03 ENCOUNTER — APPOINTMENT (OUTPATIENT)
Dept: GENERAL RADIOLOGY | Age: 71
End: 2021-06-03
Payer: MEDICARE

## 2021-06-03 ENCOUNTER — HOSPITAL ENCOUNTER (EMERGENCY)
Age: 71
Discharge: HOME | End: 2021-06-03
Attending: STUDENT IN AN ORGANIZED HEALTH CARE EDUCATION/TRAINING PROGRAM
Payer: MEDICARE

## 2021-06-03 VITALS
RESPIRATION RATE: 16 BRPM | SYSTOLIC BLOOD PRESSURE: 155 MMHG | DIASTOLIC BLOOD PRESSURE: 97 MMHG | OXYGEN SATURATION: 95 % | TEMPERATURE: 97 F | HEART RATE: 92 BPM

## 2021-06-03 DIAGNOSIS — G89.29 ACUTE EXACERBATION OF CHRONIC LOW BACK PAIN: ICD-10-CM

## 2021-06-03 DIAGNOSIS — R32 URINARY INCONTINENCE, UNSPECIFIED TYPE: ICD-10-CM

## 2021-06-03 DIAGNOSIS — R05.9 COUGH: ICD-10-CM

## 2021-06-03 DIAGNOSIS — Z98.1 STATUS POST LUMBAR SPINAL FUSION: ICD-10-CM

## 2021-06-03 DIAGNOSIS — M54.50 ACUTE EXACERBATION OF CHRONIC LOW BACK PAIN: ICD-10-CM

## 2021-06-03 DIAGNOSIS — E87.6 HYPOKALEMIA: ICD-10-CM

## 2021-06-03 DIAGNOSIS — M21.372 LEFT FOOT DROP: Primary | ICD-10-CM

## 2021-06-03 DIAGNOSIS — E83.42 HYPOMAGNESEMIA: ICD-10-CM

## 2021-06-03 DIAGNOSIS — R06.02 SHORTNESS OF BREATH: ICD-10-CM

## 2021-06-03 LAB
A/G RATIO: 1 (ref 1.1–2.2)
ALBUMIN SERPL-MCNC: 4 G/DL (ref 3.4–5)
ALP BLD-CCNC: 94 U/L (ref 40–129)
ALT SERPL-CCNC: 20 U/L (ref 10–40)
ANION GAP SERPL CALCULATED.3IONS-SCNC: 10 MMOL/L (ref 3–16)
AST SERPL-CCNC: 31 U/L (ref 15–37)
BACTERIA: ABNORMAL /HPF
BASE EXCESS VENOUS: 7 MMOL/L (ref -3–3)
BASOPHILS ABSOLUTE: 0 K/UL (ref 0–0.2)
BASOPHILS RELATIVE PERCENT: 0.3 %
BILIRUB SERPL-MCNC: 0.4 MG/DL (ref 0–1)
BILIRUBIN URINE: NEGATIVE
BLOOD, URINE: NEGATIVE
BUN BLDV-MCNC: 12 MG/DL (ref 7–20)
CALCIUM SERPL-MCNC: 9.6 MG/DL (ref 8.3–10.6)
CARBOXYHEMOGLOBIN: 4.5 % (ref 0–1.5)
CHLORIDE BLD-SCNC: 102 MMOL/L (ref 99–110)
CLARITY: ABNORMAL
CO2: 29 MMOL/L (ref 21–32)
COLOR: YELLOW
CREAT SERPL-MCNC: 0.7 MG/DL (ref 0.6–1.2)
EKG ATRIAL RATE: 93 BPM
EKG DIAGNOSIS: NORMAL
EKG P AXIS: 40 DEGREES
EKG P-R INTERVAL: 132 MS
EKG Q-T INTERVAL: 372 MS
EKG QRS DURATION: 92 MS
EKG QTC CALCULATION (BAZETT): 462 MS
EKG R AXIS: -10 DEGREES
EKG T AXIS: 44 DEGREES
EKG VENTRICULAR RATE: 93 BPM
EOSINOPHILS ABSOLUTE: 0 K/UL (ref 0–0.6)
EOSINOPHILS RELATIVE PERCENT: 0.2 %
EPITHELIAL CELLS, UA: 10 /HPF (ref 0–5)
GFR AFRICAN AMERICAN: >60
GFR NON-AFRICAN AMERICAN: >60
GLOBULIN: 4.2 G/DL
GLUCOSE BLD-MCNC: 129 MG/DL (ref 70–99)
GLUCOSE URINE: NEGATIVE MG/DL
HCO3 VENOUS: 32.2 MMOL/L (ref 23–29)
HCT VFR BLD CALC: 35.3 % (ref 36–48)
HEMOGLOBIN, VEN, REDUCED: 39 %
HEMOGLOBIN: 11.4 G/DL (ref 12–16)
HYALINE CASTS: 7 /LPF (ref 0–8)
KETONES, URINE: NEGATIVE MG/DL
LACTIC ACID, SEPSIS: 1.9 MMOL/L (ref 0.4–1.9)
LEUKOCYTE ESTERASE, URINE: NEGATIVE
LYMPHOCYTES ABSOLUTE: 1.4 K/UL (ref 1–5.1)
LYMPHOCYTES RELATIVE PERCENT: 14.1 %
MAGNESIUM: 1.4 MG/DL (ref 1.8–2.4)
MCH RBC QN AUTO: 23.6 PG (ref 26–34)
MCHC RBC AUTO-ENTMCNC: 32.2 G/DL (ref 31–36)
MCV RBC AUTO: 73.3 FL (ref 80–100)
METHEMOGLOBIN VENOUS: 0.1 %
MICROSCOPIC EXAMINATION: YES
MONOCYTES ABSOLUTE: 0.6 K/UL (ref 0–1.3)
MONOCYTES RELATIVE PERCENT: 6.5 %
NEUTROPHILS ABSOLUTE: 7.9 K/UL (ref 1.7–7.7)
NEUTROPHILS RELATIVE PERCENT: 78.9 %
NITRITE, URINE: NEGATIVE
O2 CONTENT, VEN: 9 VOL %
O2 SAT, VEN: 59 %
O2 THERAPY: ABNORMAL
PCO2, VEN: 47.5 MMHG (ref 40–50)
PDW BLD-RTO: 16.7 % (ref 12.4–15.4)
PH UA: 7 (ref 5–8)
PH VENOUS: 7.44 (ref 7.35–7.45)
PLATELET # BLD: 266 K/UL (ref 135–450)
PMV BLD AUTO: 8 FL (ref 5–10.5)
PO2, VEN: ABNORMAL MMHG (ref 25–40)
POTASSIUM REFLEX MAGNESIUM: 3.3 MMOL/L (ref 3.5–5.1)
PRO-BNP: 139 PG/ML (ref 0–124)
PROTEIN UA: NEGATIVE MG/DL
RBC # BLD: 4.82 M/UL (ref 4–5.2)
RBC UA: 2 /HPF (ref 0–4)
SEDIMENTATION RATE, ERYTHROCYTE: 41 MM/HR (ref 0–30)
SODIUM BLD-SCNC: 141 MMOL/L (ref 136–145)
SPECIFIC GRAVITY UA: 1.02 (ref 1–1.03)
TCO2 CALC VENOUS: 75 MMOL/L
TOTAL PROTEIN: 8.2 G/DL (ref 6.4–8.2)
TROPONIN: <0.01 NG/ML
URINE REFLEX TO CULTURE: ABNORMAL
URINE TYPE: ABNORMAL
UROBILINOGEN, URINE: 1 E.U./DL
WBC # BLD: 10 K/UL (ref 4–11)
WBC UA: 8 /HPF (ref 0–5)

## 2021-06-03 PROCEDURE — A9577 INJ MULTIHANCE: HCPCS | Performed by: PHYSICIAN ASSISTANT

## 2021-06-03 PROCEDURE — 72158 MRI LUMBAR SPINE W/O & W/DYE: CPT

## 2021-06-03 PROCEDURE — 6370000000 HC RX 637 (ALT 250 FOR IP): Performed by: PHYSICIAN ASSISTANT

## 2021-06-03 PROCEDURE — 87040 BLOOD CULTURE FOR BACTERIA: CPT

## 2021-06-03 PROCEDURE — 81001 URINALYSIS AUTO W/SCOPE: CPT

## 2021-06-03 PROCEDURE — 71045 X-RAY EXAM CHEST 1 VIEW: CPT

## 2021-06-03 PROCEDURE — 72157 MRI CHEST SPINE W/O & W/DYE: CPT

## 2021-06-03 PROCEDURE — 84484 ASSAY OF TROPONIN QUANT: CPT

## 2021-06-03 PROCEDURE — 80053 COMPREHEN METABOLIC PANEL: CPT

## 2021-06-03 PROCEDURE — 93005 ELECTROCARDIOGRAM TRACING: CPT | Performed by: PHYSICIAN ASSISTANT

## 2021-06-03 PROCEDURE — 83880 ASSAY OF NATRIURETIC PEPTIDE: CPT

## 2021-06-03 PROCEDURE — 82803 BLOOD GASES ANY COMBINATION: CPT

## 2021-06-03 PROCEDURE — 83605 ASSAY OF LACTIC ACID: CPT

## 2021-06-03 PROCEDURE — 6360000004 HC RX CONTRAST MEDICATION: Performed by: PHYSICIAN ASSISTANT

## 2021-06-03 PROCEDURE — 2580000003 HC RX 258: Performed by: PHYSICIAN ASSISTANT

## 2021-06-03 PROCEDURE — 83735 ASSAY OF MAGNESIUM: CPT

## 2021-06-03 PROCEDURE — 99284 EMERGENCY DEPT VISIT MOD MDM: CPT

## 2021-06-03 PROCEDURE — 85652 RBC SED RATE AUTOMATED: CPT

## 2021-06-03 PROCEDURE — 93010 ELECTROCARDIOGRAM REPORT: CPT | Performed by: INTERNAL MEDICINE

## 2021-06-03 PROCEDURE — 85025 COMPLETE CBC W/AUTO DIFF WBC: CPT

## 2021-06-03 RX ORDER — LIDOCAINE 4 G/G
1 PATCH TOPICAL ONCE
Status: DISCONTINUED | OUTPATIENT
Start: 2021-06-03 | End: 2021-06-03 | Stop reason: HOSPADM

## 2021-06-03 RX ORDER — CEPHALEXIN 500 MG/1
500 CAPSULE ORAL 4 TIMES DAILY
Qty: 20 CAPSULE | Refills: 0 | Status: SHIPPED | OUTPATIENT
Start: 2021-06-03 | End: 2021-06-08

## 2021-06-03 RX ORDER — SODIUM CHLORIDE 9 MG/ML
INJECTION, SOLUTION INTRAVENOUS ONCE
Status: COMPLETED | OUTPATIENT
Start: 2021-06-03 | End: 2021-06-03

## 2021-06-03 RX ORDER — OXYCODONE HYDROCHLORIDE AND ACETAMINOPHEN 5; 325 MG/1; MG/1
1 TABLET ORAL ONCE
Status: COMPLETED | OUTPATIENT
Start: 2021-06-03 | End: 2021-06-03

## 2021-06-03 RX ORDER — CEPHALEXIN 250 MG/1
500 CAPSULE ORAL ONCE
Status: COMPLETED | OUTPATIENT
Start: 2021-06-03 | End: 2021-06-03

## 2021-06-03 RX ORDER — POTASSIUM CHLORIDE 20 MEQ/1
40 TABLET, EXTENDED RELEASE ORAL ONCE
Status: COMPLETED | OUTPATIENT
Start: 2021-06-03 | End: 2021-06-03

## 2021-06-03 RX ADMIN — GADOBENATE DIMEGLUMINE 24 ML: 529 INJECTION, SOLUTION INTRAVENOUS at 17:35

## 2021-06-03 RX ADMIN — OXYCODONE HYDROCHLORIDE AND ACETAMINOPHEN 1 TABLET: 5; 325 TABLET ORAL at 11:07

## 2021-06-03 RX ADMIN — MAGNESIUM GLUCONATE 500 MG ORAL TABLET 400 MG: 500 TABLET ORAL at 18:30

## 2021-06-03 RX ADMIN — SODIUM CHLORIDE: 9 INJECTION, SOLUTION INTRAVENOUS at 17:36

## 2021-06-03 RX ADMIN — CEPHALEXIN 500 MG: 250 CAPSULE ORAL at 18:31

## 2021-06-03 RX ADMIN — POTASSIUM CHLORIDE 40 MEQ: 1500 TABLET, EXTENDED RELEASE ORAL at 18:31

## 2021-06-03 ASSESSMENT — ENCOUNTER SYMPTOMS
VOMITING: 0
BACK PAIN: 1
ABDOMINAL PAIN: 0
DIARRHEA: 0
NAUSEA: 0
CHEST TIGHTNESS: 0
COUGH: 1
SHORTNESS OF BREATH: 1

## 2021-06-03 ASSESSMENT — PAIN SCALES - GENERAL: PAINLEVEL_OUTOF10: 10

## 2021-06-03 NOTE — ED PROVIDER NOTES
[address] [facility]        Pt Name: Isabella Wilson  MRN: [id number]  [place] 1950  Date of evaluation: 6/3/2021  Provider: Pancho Brito PA-C  PCP: Clare Mortensen MD  Note Started: 12:25 PM EDT        I have seen and evaluated this patient with my supervising physician Sukumar Barrera MD.    [address]       Chief Complaint   Patient presents with    Cough     patient states on going dry cough for over a week, states \" a tiny piece of hamburger got stuck last week and nini been coughing ever since\" states she is now coughing so hard \"im wetting myself\"    Leg Pain     back surgery 2 years ago, leg pain for a few weeks       HISTORY OF PRESENT ILLNESS   (Location, Timing/Onset, Context/Setting, Quality, Duration, Modifying Factors, Severity, Associated Signs and Symptoms)  Note limiting factors. Isabella Wilson is a 80 y.o. female who presents with a number of different complaints. Patient tells me that she has been having difficulty as a pertains to bilateral lower extremity swelling, increasing cough, shortness of breath, urinary in continence and difficulties as a pertains to chronic low back pain and left leg weakness. Patient's history dates back some 2 years ago as it pertains to her lumbar spine. She had a multilevel fusion done. She states that she has struggled and sees a pain management doctor for her back pain. She states for whatever reason she did not have her regular and usual medicine for her back today and is having increasing levels of pain. She states her current level of pain is 10 out of 10. Unfortunately she also reports that she is having symptoms related to urinary incontinence and left leg weakness. She has had difficulties with left leg weakness in the past but the urinary incontinence is relatively new. She states that she has been having that for almost a week.   She states that she had the possibility of aspirating a piece of hamburger which she thinks is why she is been coughing. Patient thinks that it \"went down the wrong tube\". She states that she has had some notations of feeling chilled but has not had a documented fever. She also goes on to report that her cough is essentially nonproductive. She states unfortunately it is giving her some shortness of breath on top of it. She states that she has not had difficulty such as this. She is me she is having coughing fits that occur frequently and unfortunately with that she is had urinary incontinence. She denies bowel incontinence. Patient tells me she has had worsening symptoms that have been intermittent in nature as it pertains to left leg pain which she has had off and on ever since she had surgery. Patient does ambulate with a single prong cane with the above-mentioned presents the ED for evaluation and treatment of all the above mention regarding her pain to be 10 out of 10. She denies that she is experiencing chest pain. She denies unilateral leg pain or swelling states is been equal and symmetric despite that says that she does not have a history that she knows of of congestive heart failure. She denies a history of DVT and or PE. No additional thromboembolic risk factors are noted. Denies IV drug use. No additional reports of difficulty as a pertains to abdominal or flank pain. No nausea vomiting or diarrhea. No additional GI or  complaints at present. Nursing Notes were all reviewed and agreed with or any disagreements were addressed in the HPI. REVIEW OF SYSTEMS    (2-9 systems for level 4, 10 or more for level 5)     Review of Systems   Constitutional: Negative for activity change, chills and fever. Respiratory: Positive for cough and shortness of breath. Negative for chest tightness. Cardiovascular: Positive for leg swelling. Negative for chest pain.    Gastrointestinal: Negative for abdominal pain, Daily.    METOPROLOL (LOPRESSOR) 100 MG TABLET    Take 100 mg by mouth daily    MULTIPLE VITAMINS-MINERALS (THERAPEUTIC MULTIVITAMIN-MINERALS) TABLET    Take 1 tablet by mouth daily    NAPROXEN (NAPROSYN) 500 MG TABLET    Take 1 tablet by mouth 2 times daily as needed for Pain    NAPROXEN (NAPROSYN) 500 MG TABLET    Take 1 tablet by mouth 2 times daily (with meals)    NIACIN (VITAMIN B-3 PO)    Take by mouth    ONDANSETRON (ZOFRAN-ODT) 4 MG DISINTEGRATING TABLET    Take 1 tablet by mouth 3 times daily as needed for Nausea or Vomiting    OXYCODONE-ACETAMINOPHEN (PERCOCET) 5-325 MG PER TABLET    Take 1 tablet by mouth 2 times daily. POTASSIUM CHLORIDE (KLOR-CON M) 20 MEQ EXTENDED RELEASE TABLET    Take 20 mEq by mouth daily    TAMSULOSIN (FLOMAX) 0.4 MG CAPSULE    Take 2 capsules by mouth daily for 7 days         ALLERGIES     Lisinopril    FAMILYHISTORY       Family History   Problem Relation Age of Onset    Diabetes Mother           SOCIAL HISTORY       Social History     Tobacco Use    Smoking status: Never Smoker    Smokeless tobacco: Never Used   Vaping Use    Vaping Use: Never used   Substance Use Topics    Alcohol use: No    Drug use: No       SCREENINGS             PHYSICAL EXAM    (up to 7 for level 4, 8 or more for level 5)     ED Triage Vitals [06/03/21 1014]   BP Temp Temp src Pulse Resp SpO2 Height Weight   (!) 170/96 97 °F (36.1 °C) -- 92 16 95 % -- --       Physical Exam  Vitals and nursing note reviewed. Constitutional:       General: She is awake. She is not in acute distress. Appearance: Normal appearance. She is well-developed. She is morbidly obese. She is ill-appearing (chronically). She is not diaphoretic. Comments: Despite reports of pain and discomfort as documented above the patient is resting comfortably in the examination room going through receipts in her wallet. HENT:      Head: Normocephalic and atraumatic. No raccoon eyes, Roque's sign, contusion or laceration. deficit. Sensory: No sensory deficit. Coordination: Coordination normal.   Psychiatric:         Behavior: Behavior normal. Behavior is cooperative.          DIAGNOSTIC RESULTS   LABS:    Labs Reviewed   CBC WITH AUTO DIFFERENTIAL - Abnormal; Notable for the following components:       Result Value    Hemoglobin 11.4 (*)     Hematocrit 35.3 (*)     MCV 73.3 (*)     MCH 23.6 (*)     RDW 16.7 (*)     Neutrophils Absolute 7.9 (*)     All other components within normal limits    Narrative:     Performed at:  [facility] [address]   Phone [phone]   COMPREHENSIVE METABOLIC PANEL W/ REFLEX TO MG FOR LOW K - Abnormal; Notable for the following components:    Potassium reflex Magnesium 3.3 (*)     Glucose 129 (*)     Albumin/Globulin Ratio 1.0 (*)     All other components within normal limits    Narrative:     Performed at:  [facility] [address]   Phone [phone]   BRAIN NATRIURETIC PEPTIDE - Abnormal; Notable for the following components:    Pro- (*)     All other components within normal limits    Narrative:     Performed at:  [facility] [address]   Phone [phone]   URINE RT REFLEX TO CULTURE - Abnormal; Notable for the following components:    Clarity, UA CLOUDY (*)     All other components within normal limits    Narrative:     Performed at:  [facility] [address]   Phone [phone]   BLOOD GAS, VENOUS - Abnormal; Notable for the following components:    HCO3, Venous 32.2 (*)     Base Excess, Yong 7.0 (*)     Carboxyhemoglobin 4.5 (*)     All other components within normal limits    Narrative:     Performed at:  [facility] [address]   Phone [phone]   MAGNESIUM - Abnormal; Notable for the following components:    Magnesium 1.40 (*)     All other components within normal limits    Narrative:     Performed at:  [facility] [address] [facility]  [place], [facility] [address] [employer]   Phone [phone]   MICROSCOPIC URINALYSIS - Abnormal; Notable for the following components:    Bacteria, UA 3+ (*)     WBC, UA 8 (*)     Epithelial Cells, UA 10 (*)     All other components within normal limits    Narrative:     Performed at:  [facility] [address] [address] [address]   Phone [phone]   CULTURE, BLOOD 1   CULTURE, BLOOD 2   TROPONIN    Narrative:     Performed at:  [facility] [address] [facility] [employer]   Phone [phone]   LACTATE, SEPSIS    Narrative:     Performed at:  [facility] [address] [address] [employer]   Phone [phone]   LACTATE, SEPSIS   SEDIMENTATION RATE       All other labs were within normal range or not returned as of this dictation. EKG: All EKG's are interpreted by the Emergency Department Physician in the absence of a cardiologist.  Please see their note for interpretation of EKG.     RADIOLOGY:   Non-plain film images such as CT, Ultrasound and MRI are read by the radiologist. Plain radiographic images are visualized and preliminarily interpreted by the ED Provider with the below findings:        Interpretation per the Radiologist below, if available at the time of this note:    XR CHEST PORTABLE   Final Result   No active cardiopulmonary disease         MRI THORACIC SPINE W WO CONTRAST    (Results Pending)   MRI LUMBAR SPINE W WO CONTRAST    (Results Pending)     XR CHEST PORTABLE    Result Date: 6/3/2021  EXAMINATION: ONE XRAY VIEW OF THE CHEST 6/3/2021 10:25 am COMPARISON: 07/20/2020 HISTORY: ORDERING SYSTEM PROVIDED HISTORY: cough TECHNOLOGIST PROVIDED HISTORY: Reason for exam:->cough Reason for Exam: Cough (patient states on going dry cough for over a week, states \" a tiny piece of hamburger got stuck last week and nini been coughing ever since\" states she is now coughing so hard \"im wetting myself\") Acuity: Acute Type of Exam: Initial FINDINGS: Heart size and pulmonary vasculature within normal limits. Haziness of the lower lungs due to the patient's body habitus. Lungs clear. Costophrenic angles sharp     No active cardiopulmonary disease           PROCEDURES   Unless otherwise noted below, none     Procedures    CRITICAL CARE TIME   N/A    CONSULTS:  None      EMERGENCY DEPARTMENT COURSE and DIFFERENTIAL DIAGNOSIS/MDM:   Vitals:    Vitals:    06/03/21 1400 06/03/21 1430 06/03/21 1500 06/03/21 1530   BP: 114/85 138/85 130/86 (!) 158/83   Pulse:       Resp:       Temp:       SpO2: 94% 94% 93% 92%       Patient was given the following medications:  Medications   lidocaine 4 % external patch 1 patch (1 patch Transdermal Patch Applied 6/3/21 1106)   cephALEXin (KEFLEX) capsule 500 mg (has no administration in time range)   potassium chloride (KLOR-CON M) extended release tablet 40 mEq (has no administration in time range)   magnesium oxide (MAG-OX) tablet 400 mg (has no administration in time range)   oxyCODONE-acetaminophen (PERCOCET) 5-325 MG per tablet 1 tablet (1 tablet Oral Given 6/3/21 1107)           The patient's detailed history of present illness is documented as above. Upon arrival to the emergency department the patient's vital signs are as documented. The patient is noted to be hemodynamically stable and afebrile. Physical examination findings are as above. IV access was obtained laboratory testing work-up was initiated. Unfortunately work-up was required with the patient's above-mentioned symptomatology. EKG performed upon arrival demonstrates a sinus rhythm with a rate of 93. Normal axis and interval.  No evidence of acute ST elevation.   Please see attending physician details for further EKG interpretation as well as comparison from July 20, 2020. CBC demonstrates no evidence of leukocytosis. She has anemia 11.4 and 35.3 respectively with no evidence of thrombocytopenia or thrombocytosis. BUN is 12 creatinine is 0.7 nonfasting glucose is 129 her potassium is mildly low at 3.3 and this will be replaced above. CO2 at 29 gap of 10 LFTs unremarkable. Troponin is less than 0.01. BNP is 139. Lactic acid is not elevated at 1.9. Venous blood gas demonstrates a normal pH without evidence of hypercapnia or hypoxia. Magnesium is low as well at 1.4. Sed rate is still in process. Urinalysis demonstrates contamination and 8 white blood cells with bacteria which likely is most consistent with contamination but that in the setting of the advanced age as well as the patient's reports of difficulty with loss of urine I will go ahead and treat this. Normal chest x-ray demonstrates no evidence of acute cardiopulmonary process. At the time of this dictation because the patient symptomatology MRI of her thoracolumbar spine is currently outstanding as per documented left-sided foot drop as well as urinary symptoms she unfortunately cannot be ruled out with an abnormal neurologic examination without this imaging. As it is the end of my shift, my attending physician will follow up on the outstanding test results and assume the final disposition of this patient. Please see attending physician note for further medical decision making, consultations, and final disposition of this patient. FINAL IMPRESSION      1. Left foot drop    2. Acute exacerbation of chronic low back pain    3. Shortness of breath    4. Cough    5. Urinary incontinence, unspecified type    6. Hypokalemia    7. Hypomagnesemia    8.  Status post lumbar spinal fusion          DISPOSITION/PLAN   DISPOSITION: Pending the time of this dictation         (Please note that portions of this note were completed with a voice recognition program. Efforts were made to edit the dictations but occasionally words are mis-transcribed.)    Lincoln Rosa PA-C (electronically signed)           Lisa Armstrong PA-C  06/03/21 8912

## 2021-06-03 NOTE — ED PROVIDER NOTES
I independently performed a history and physical on Joie Simmonds. All diagnostic, treatment, and disposition decisions were made by myself in conjunction with the advanced practice provider. Briefly, this is a 80 y.o. female here for low back pain down the left leg. She has had this pain in the past but it seems to be worsening. He does see pain management. She has a hx of lumbar fusion L2-L5 in 2019 with Dr. Petros Galaviz. Hx chronic L foot drop. Leg is not weaker than usual.  No fevers or injury to her back. She is concerned because she has lost continence of her urine several times in the last 1 week. This seems to be worse with coughing. On exam pt is resting comfortably  Cardiac RRR, no murmur  Lungs clear bilaterally, no increased work of breathing  Abdomen soft nontender  5 out of 5 strength with bilateral hip flexion. She has no drift in bilateral lower extremities. Left foot drop is noted which she states is at baseline. EKG  The Ekg interpreted by me in the absence of a cardiologist shows. normal sinus rhythm with a rate of 93  Axis is   Normal  QTc is  normal  Intervals and Durations are unremarkable. No specific ST-T wave changes appreciated. No evidence of acute ischemia. No significant change from prior EKG dated 7/20/2020      MRI LUMBAR SPINE W WO CONTRAST   Final Result   1. Multilevel degenerative changes as described. 2. Other findings as described. MRI THORACIC SPINE W WO CONTRAST   Final Result   1. Scattered degenerative changes as described, worse at T6-T7 where there is   moderate to severe canal stenosis with flattening of the ventral cord. 2. Multilevel canal stenoses in the cervical spine would be better evaluated   by dedicated MRI. 3. Overall limited evaluation of thoracic spine due to technique and motion   artifact. Vascular congestion versus leptomeningeal enhancement. Correlate   clinically to exclude infection.    4. Other findings as described. XR CHEST PORTABLE   Final Result   No active cardiopulmonary disease             Patient underwent MRI imaging of the T and L-spine to assess for cauda equina or postoperative complication in the setting of worsening left lower extremity pain and urinary incontinence. MRI is showing mostly chronic changes however there is note of T6-T7 ventral cord flattening. I discussed this case with NSGY on call Dr. Cavanaugh Needs 1900. He was able to view imaging and believes there is no abnormal cord signal not c/w cauda equina. Lesion of T6/7 would not present as loss of bladder function and rather as acute lower extremity weakness and numbness so it is unlikely that this is the cause of her incontinence. He suspects that this ventral cord flattening is not acute but he does recommend close follow up. She is agreeable to follow up with Dr. Rod Espitia next week and return to the emergency room for any new weakness or numbness in her leg, fever or worsening pain. In terms of her incontinence it is very consistent with stress incontinence as she is usually getting it after vagal maneuvers such as coughing or bearing down. Urine does appear infected and due to her older age we will treat this today with Keflex therapy at home. She was given strict cautions to return for any new weakness or numbness down the leg, fevers or intractable pain at home. She is agreeable to plan. Patient Referrals:  Ricco Sotelo. Rod Espitia, [address] [place] [id number] [phone]    In 3 days        Discharge Medications:  New Prescriptions    CEPHALEXIN (KEFLEX) 500 MG CAPSULE    Take 1 capsule by mouth 4 times daily for 5 days       FINAL IMPRESSION  1. Left foot drop    2. Acute exacerbation of chronic low back pain    3. Shortness of breath    4. Cough    5. Urinary incontinence, unspecified type    6. Hypokalemia    7. Hypomagnesemia    8.  Status post lumbar spinal fusion        Blood pressure (!) 155/97, pulse 92, temperature 97 °F (36.1 °C), resp. rate 16, SpO2 95 %.      For further details of [facility] emergency department encounter, please see documentation by advanced practice provider        Mitzy Crawford MD  06/03/21 0530

## 2021-06-03 NOTE — ED NOTES
Bed: 08  Expected date:   Expected time:   Means of arrival:   Comments:  Martín Liu RN  06/03/21 7151

## 2021-06-07 LAB — BLOOD CULTURE, ROUTINE: NORMAL

## 2021-11-09 ENCOUNTER — HOSPITAL ENCOUNTER (OUTPATIENT)
Dept: WOMENS IMAGING | Age: 71
Discharge: HOME | End: 2021-11-09
Payer: MEDICARE

## 2021-11-09 VITALS — HEIGHT: 64 IN | WEIGHT: 280 LBS | BODY MASS INDEX: 47.8 KG/M2

## 2021-11-09 DIAGNOSIS — Z12.31 ENCOUNTER FOR SCREENING MAMMOGRAM FOR MALIGNANT NEOPLASM OF BREAST: ICD-10-CM

## 2021-11-09 PROCEDURE — 77063 BREAST TOMOSYNTHESIS BI: CPT

## 2022-01-01 NOTE — TELEPHONE ENCOUNTER
Problem: Physiological Stability  Goal: Vital signs and physical assessments stable and within expected parameters  Outcome: Outcome Met, Continue evaluating goal progress toward completion  Goal: Remains free of signs and symptoms of infection  Outcome: Outcome Met, Continue evaluating goal progress toward completion  Goal: Parent / caregiver verbalizes understanding of NICU care related to patient-specific condition and treatment  Description: Document on Patient Education Activity  Outcome: Outcome Met, Continue evaluating goal progress toward completion  Goal: Parent / caregiver verbalizes / demonstrates comfort with their ability to care for infant and familiarity with community resources prior to discharge  Description: Document on Patient Education Activity  Outcome: Outcome Met, Continue evaluating goal progress toward completion     Problem: Pain  Goal: Acceptable level of comfort exhibited by infant (based on N-Pass/NIPS Scoring)  Outcome: Outcome Met, Continue evaluating goal progress toward completion     Problem: Oxygenation/Respiratory Function  Goal: Optimized respiratory function and gas exchange  Outcome: Outcome Met, Continue evaluating goal progress toward completion  Goal: Parent / caregiver verbalizes understanding of infant's respiratory condition and treatment  Description: Document on Patient Education Activity  Outcome: Outcome Met, Continue evaluating goal progress toward completion     Problem: Skin Integrity  Goal: Skin integrity is maintained or improved (skin will be intact without erythma or breakdown)  Outcome: Outcome Met, Continue evaluating goal progress toward completion     Problem: Alteration in Family Bonding  Goal: Family Interaction is supported  Outcome: Outcome Met, Continue evaluating goal progress toward completion  Goal: Family demonstrates appropriate coping mechanisms  Outcome: Outcome Met, Continue evaluating goal progress toward completion     Problem:  OTHER:  Patient was returning [place] call about getting an appt. Patients says she doesn't need appt she is doing ok. Nutrition  Goal: Tolerates feedings  Outcome: Outcome Met, Continue evaluating goal progress toward completion  Goal: Consumes sufficient dietary intake without complications  Outcome: Outcome Met, Continue evaluating goal progress toward completion  Goal: Progresses toward ability to receive all feeding from breast or bottle  Outcome: Outcome Met, Continue evaluating goal progress toward completion  Goal: Achieves catch up weight gain and growth consistent with birth weight percentile  Outcome: Outcome Met, Continue evaluating goal progress toward completion  Goal: Parent/ caregiver verbalizes understanding of milk preparation,  storage and bottle feeding techniques  Description: Document on Patient Education Activity  Outcome: Outcome Met, Continue evaluating goal progress toward completion     Problem: Breastfeeding  Goal: Breast milk supply is established and maintained to provide breast milk to infant in accordance with mother's preference  Outcome: Outcome Met, Continue evaluating goal progress toward completion  Goal: Successful breastfeeding as evidenced by proper latch and adequate suck/ swallow  Outcome: Outcome Met, Continue evaluating goal progress toward completion  Goal: Parent / caregiver verbalizes understanding of benefits of exclusive breastfeeding and breastfeeding techniques  Description: Document on Patient Education Activity  Outcome: Outcome Met, Continue evaluating goal progress toward completion     Problem: Risk of altered growth and development secondary to gestational age or condition  Goal: Demonstrates improved/ appropriate age-corrected neurobehavioral competence at time of discharge or will be referred for ongoing therapy  Outcome: Outcome Met, Continue evaluating goal progress toward completion  Goal: Parent / caregiver verbalizes understanding of developmentally appropriate interaction & environment  Description: Document on Patient Education Activity  Outcome: Outcome Met, Continue  evaluating goal progress toward completion  Goal: Parent / caregiver verbalizes understanding of risk for RSV and prevention  Description: Document on Patient Education Activity  Outcome: Outcome Met, Continue evaluating goal progress toward completion     Problem: Risk of Neurological Sequela  Goal: Demonstrates improved or stable neurological status  Outcome: Outcome Met, Continue evaluating goal progress toward completion

## 2022-01-20 ENCOUNTER — HOSPITAL ENCOUNTER (INPATIENT)
Age: 72
LOS: 1 days | Discharge: HOME | DRG: 683 | End: 2022-01-21
Attending: EMERGENCY MEDICINE | Admitting: INTERNAL MEDICINE
Payer: MEDICARE

## 2022-01-20 ENCOUNTER — APPOINTMENT (OUTPATIENT)
Dept: CT IMAGING | Age: 72
DRG: 683 | End: 2022-01-20
Payer: MEDICARE

## 2022-01-20 ENCOUNTER — APPOINTMENT (OUTPATIENT)
Dept: GENERAL RADIOLOGY | Age: 72
DRG: 683 | End: 2022-01-20
Payer: MEDICARE

## 2022-01-20 DIAGNOSIS — R77.8 ELEVATED TROPONIN: ICD-10-CM

## 2022-01-20 DIAGNOSIS — N17.9 ACUTE KIDNEY INJURY (HCC): ICD-10-CM

## 2022-01-20 DIAGNOSIS — M79.10 MYALGIA: ICD-10-CM

## 2022-01-20 DIAGNOSIS — R51.9 NONINTRACTABLE HEADACHE, UNSPECIFIED CHRONICITY PATTERN, UNSPECIFIED HEADACHE TYPE: Primary | ICD-10-CM

## 2022-01-20 PROBLEM — D72.829 LEUKOCYTOSIS: Status: ACTIVE | Noted: 2022-01-20

## 2022-01-20 PROBLEM — D64.9 ANEMIA: Status: ACTIVE | Noted: 2022-01-20

## 2022-01-20 PROBLEM — E66.01 MORBID OBESITY WITH BMI OF 45.0-49.9, ADULT (HCC): Status: ACTIVE | Noted: 2022-01-20

## 2022-01-20 LAB
A/G RATIO: 1.1 (ref 1.1–2.2)
ALBUMIN SERPL-MCNC: 3.9 G/DL (ref 3.4–5)
ALP BLD-CCNC: 81 U/L (ref 40–129)
ALT SERPL-CCNC: 26 U/L (ref 10–40)
ANION GAP SERPL CALCULATED.3IONS-SCNC: 16 MMOL/L (ref 3–16)
AST SERPL-CCNC: 26 U/L (ref 15–37)
BASOPHILS ABSOLUTE: 0.1 K/UL (ref 0–0.2)
BASOPHILS RELATIVE PERCENT: 0.4 %
BILIRUB SERPL-MCNC: <0.2 MG/DL (ref 0–1)
BILIRUBIN URINE: ABNORMAL
BLOOD, URINE: NEGATIVE
BUN BLDV-MCNC: 52 MG/DL (ref 7–20)
C-REACTIVE PROTEIN: <3 MG/L (ref 0–5.1)
CALCIUM SERPL-MCNC: 9.9 MG/DL (ref 8.3–10.6)
CHLORIDE BLD-SCNC: 98 MMOL/L (ref 99–110)
CLARITY: CLEAR
CO2: 23 MMOL/L (ref 21–32)
COLOR: YELLOW
CREAT SERPL-MCNC: 1.6 MG/DL (ref 0.6–1.2)
D DIMER: 406 NG/ML DDU (ref 0–229)
EKG ATRIAL RATE: 79 BPM
EKG DIAGNOSIS: NORMAL
EKG P AXIS: 84 DEGREES
EKG P-R INTERVAL: 150 MS
EKG Q-T INTERVAL: 398 MS
EKG QRS DURATION: 82 MS
EKG QTC CALCULATION (BAZETT): 456 MS
EKG R AXIS: -19 DEGREES
EKG T AXIS: 48 DEGREES
EKG VENTRICULAR RATE: 79 BPM
EOSINOPHILS ABSOLUTE: 0.1 K/UL (ref 0–0.6)
EOSINOPHILS RELATIVE PERCENT: 0.3 %
GFR AFRICAN AMERICAN: 38
GFR NON-AFRICAN AMERICAN: 32
GLUCOSE BLD-MCNC: 147 MG/DL (ref 70–99)
GLUCOSE URINE: NEGATIVE MG/DL
HCT VFR BLD CALC: 36.2 % (ref 36–48)
HEMOGLOBIN: 11.6 G/DL (ref 12–16)
KETONES, URINE: NEGATIVE MG/DL
LACTATE DEHYDROGENASE: 245 U/L (ref 100–190)
LEUKOCYTE ESTERASE, URINE: NEGATIVE
LYMPHOCYTES ABSOLUTE: 4 K/UL (ref 1–5.1)
LYMPHOCYTES RELATIVE PERCENT: 27.4 %
MCH RBC QN AUTO: 24.3 PG (ref 26–34)
MCHC RBC AUTO-ENTMCNC: 32.2 G/DL (ref 31–36)
MCV RBC AUTO: 75.7 FL (ref 80–100)
MICROSCOPIC EXAMINATION: ABNORMAL
MONOCYTES ABSOLUTE: 1.2 K/UL (ref 0–1.3)
MONOCYTES RELATIVE PERCENT: 8.4 %
NEUTROPHILS ABSOLUTE: 9.3 K/UL (ref 1.7–7.7)
NEUTROPHILS RELATIVE PERCENT: 63.5 %
NITRITE, URINE: NEGATIVE
PDW BLD-RTO: 18.6 % (ref 12.4–15.4)
PH UA: 5 (ref 5–8)
PLATELET # BLD: 184 K/UL (ref 135–450)
PMV BLD AUTO: 8.3 FL (ref 5–10.5)
POTASSIUM REFLEX MAGNESIUM: 4 MMOL/L (ref 3.5–5.1)
PRO-BNP: 26 PG/ML (ref 0–124)
PROCALCITONIN: 0.07 NG/ML (ref 0–0.15)
PROCALCITONIN: 0.07 NG/ML (ref 0–0.15)
PROTEIN UA: NEGATIVE MG/DL
RBC # BLD: 4.79 M/UL (ref 4–5.2)
SODIUM BLD-SCNC: 137 MMOL/L (ref 136–145)
SPECIFIC GRAVITY UA: 1.02 (ref 1–1.03)
TOTAL CK: 245 U/L (ref 26–192)
TOTAL PROTEIN: 7.6 G/DL (ref 6.4–8.2)
TROPONIN: 0.02 NG/ML
URINE REFLEX TO CULTURE: ABNORMAL
URINE TYPE: ABNORMAL
UROBILINOGEN, URINE: 0.2 E.U./DL
WBC # BLD: 14.6 K/UL (ref 4–11)

## 2022-01-20 PROCEDURE — 82728 ASSAY OF FERRITIN: CPT

## 2022-01-20 PROCEDURE — 82550 ASSAY OF CK (CPK): CPT

## 2022-01-20 PROCEDURE — 81003 URINALYSIS AUTO W/O SCOPE: CPT

## 2022-01-20 PROCEDURE — G0378 HOSPITAL OBSERVATION PER HR: HCPCS

## 2022-01-20 PROCEDURE — U0005 INFEC AGEN DETEC AMPLI PROBE: HCPCS

## 2022-01-20 PROCEDURE — 6370000000 HC RX 637 (ALT 250 FOR IP): Performed by: EMERGENCY MEDICINE

## 2022-01-20 PROCEDURE — 85379 FIBRIN DEGRADATION QUANT: CPT

## 2022-01-20 PROCEDURE — 96372 THER/PROPH/DIAG INJ SC/IM: CPT

## 2022-01-20 PROCEDURE — 6360000002 HC RX W HCPCS: Performed by: INTERNAL MEDICINE

## 2022-01-20 PROCEDURE — 70450 CT HEAD/BRAIN W/O DYE: CPT

## 2022-01-20 PROCEDURE — 84484 ASSAY OF TROPONIN QUANT: CPT

## 2022-01-20 PROCEDURE — 85025 COMPLETE CBC W/AUTO DIFF WBC: CPT

## 2022-01-20 PROCEDURE — 2580000003 HC RX 258: Performed by: EMERGENCY MEDICINE

## 2022-01-20 PROCEDURE — U0003 INFECTIOUS AGENT DETECTION BY NUCLEIC ACID (DNA OR RNA); SEVERE ACUTE RESPIRATORY SYNDROME CORONAVIRUS 2 (SARS-COV-2) (CORONAVIRUS DISEASE [COVID-19]), AMPLIFIED PROBE TECHNIQUE, MAKING USE OF HIGH THROUGHPUT TECHNOLOGIES AS DESCRIBED BY CMS-2020-01-R: HCPCS

## 2022-01-20 PROCEDURE — 2580000003 HC RX 258: Performed by: INTERNAL MEDICINE

## 2022-01-20 PROCEDURE — 93010 ELECTROCARDIOGRAM REPORT: CPT | Performed by: INTERNAL MEDICINE

## 2022-01-20 PROCEDURE — 80053 COMPREHEN METABOLIC PANEL: CPT

## 2022-01-20 PROCEDURE — 99284 EMERGENCY DEPT VISIT MOD MDM: CPT

## 2022-01-20 PROCEDURE — 86140 C-REACTIVE PROTEIN: CPT

## 2022-01-20 PROCEDURE — 36415 COLL VENOUS BLD VENIPUNCTURE: CPT

## 2022-01-20 PROCEDURE — 84145 PROCALCITONIN (PCT): CPT

## 2022-01-20 PROCEDURE — 71046 X-RAY EXAM CHEST 2 VIEWS: CPT

## 2022-01-20 PROCEDURE — 93005 ELECTROCARDIOGRAM TRACING: CPT | Performed by: EMERGENCY MEDICINE

## 2022-01-20 PROCEDURE — 1200000000 HC SEMI PRIVATE

## 2022-01-20 PROCEDURE — 83880 ASSAY OF NATRIURETIC PEPTIDE: CPT

## 2022-01-20 PROCEDURE — 83615 LACTATE (LD) (LDH) ENZYME: CPT

## 2022-01-20 PROCEDURE — 6370000000 HC RX 637 (ALT 250 FOR IP): Performed by: INTERNAL MEDICINE

## 2022-01-20 RX ORDER — 0.9 % SODIUM CHLORIDE 0.9 %
1000 INTRAVENOUS SOLUTION INTRAVENOUS ONCE
Status: COMPLETED | OUTPATIENT
Start: 2022-01-20 | End: 2022-01-20

## 2022-01-20 RX ORDER — AMLODIPINE BESYLATE 5 MG/1
10 TABLET ORAL DAILY
Status: DISCONTINUED | OUTPATIENT
Start: 2022-01-20 | End: 2022-01-21 | Stop reason: HOSPADM

## 2022-01-20 RX ORDER — SODIUM CHLORIDE 9 MG/ML
INJECTION, SOLUTION INTRAVENOUS CONTINUOUS
Status: DISCONTINUED | OUTPATIENT
Start: 2022-01-20 | End: 2022-01-21 | Stop reason: HOSPADM

## 2022-01-20 RX ORDER — SODIUM CHLORIDE 9 MG/ML
25 INJECTION, SOLUTION INTRAVENOUS PRN
Status: DISCONTINUED | OUTPATIENT
Start: 2022-01-20 | End: 2022-01-21 | Stop reason: HOSPADM

## 2022-01-20 RX ORDER — ONDANSETRON 2 MG/ML
4 INJECTION INTRAMUSCULAR; INTRAVENOUS EVERY 6 HOURS PRN
Status: DISCONTINUED | OUTPATIENT
Start: 2022-01-20 | End: 2022-01-21 | Stop reason: HOSPADM

## 2022-01-20 RX ORDER — ACETAMINOPHEN 650 MG/1
650 SUPPOSITORY RECTAL EVERY 6 HOURS PRN
Status: DISCONTINUED | OUTPATIENT
Start: 2022-01-20 | End: 2022-01-21 | Stop reason: HOSPADM

## 2022-01-20 RX ORDER — DULOXETIN HYDROCHLORIDE 30 MG/1
30 CAPSULE, DELAYED RELEASE ORAL DAILY
COMMUNITY

## 2022-01-20 RX ORDER — POTASSIUM CHLORIDE 20 MEQ/1
20 TABLET, EXTENDED RELEASE ORAL DAILY
Status: DISCONTINUED | OUTPATIENT
Start: 2022-01-20 | End: 2022-01-20

## 2022-01-20 RX ORDER — SODIUM CHLORIDE 0.9 % (FLUSH) 0.9 %
5-40 SYRINGE (ML) INJECTION PRN
Status: DISCONTINUED | OUTPATIENT
Start: 2022-01-20 | End: 2022-01-21 | Stop reason: HOSPADM

## 2022-01-20 RX ORDER — ASPIRIN 81 MG/1
324 TABLET, CHEWABLE ORAL ONCE
Status: COMPLETED | OUTPATIENT
Start: 2022-01-20 | End: 2022-01-20

## 2022-01-20 RX ORDER — SODIUM CHLORIDE 0.9 % (FLUSH) 0.9 %
5-40 SYRINGE (ML) INJECTION EVERY 12 HOURS SCHEDULED
Status: DISCONTINUED | OUTPATIENT
Start: 2022-01-20 | End: 2022-01-21 | Stop reason: HOSPADM

## 2022-01-20 RX ORDER — ATORVASTATIN CALCIUM 10 MG/1
10 TABLET, FILM COATED ORAL DAILY
Status: DISCONTINUED | OUTPATIENT
Start: 2022-01-20 | End: 2022-01-21 | Stop reason: HOSPADM

## 2022-01-20 RX ORDER — MORPHINE SULFATE 4 MG/ML
4 INJECTION, SOLUTION INTRAMUSCULAR; INTRAVENOUS EVERY 4 HOURS PRN
Status: DISCONTINUED | OUTPATIENT
Start: 2022-01-20 | End: 2022-01-21 | Stop reason: HOSPADM

## 2022-01-20 RX ORDER — ONDANSETRON 4 MG/1
4 TABLET, ORALLY DISINTEGRATING ORAL EVERY 8 HOURS PRN
Status: DISCONTINUED | OUTPATIENT
Start: 2022-01-20 | End: 2022-01-21 | Stop reason: HOSPADM

## 2022-01-20 RX ORDER — LEVOTHYROXINE SODIUM 0.1 MG/1
150 TABLET ORAL DAILY
Status: DISCONTINUED | OUTPATIENT
Start: 2022-01-21 | End: 2022-01-21 | Stop reason: HOSPADM

## 2022-01-20 RX ORDER — HEPARIN SODIUM 5000 [USP'U]/ML
5000 INJECTION, SOLUTION INTRAVENOUS; SUBCUTANEOUS EVERY 8 HOURS SCHEDULED
Status: DISCONTINUED | OUTPATIENT
Start: 2022-01-20 | End: 2022-01-21 | Stop reason: HOSPADM

## 2022-01-20 RX ORDER — ONDANSETRON 4 MG/1
4 TABLET, ORALLY DISINTEGRATING ORAL 3 TIMES DAILY PRN
Status: DISCONTINUED | OUTPATIENT
Start: 2022-01-20 | End: 2022-01-20 | Stop reason: SDUPTHER

## 2022-01-20 RX ORDER — ASPIRIN 81 MG/1
324 TABLET, CHEWABLE ORAL ONCE
Status: DISCONTINUED | OUTPATIENT
Start: 2022-01-20 | End: 2022-01-20

## 2022-01-20 RX ORDER — OXYCODONE HYDROCHLORIDE AND ACETAMINOPHEN 5; 325 MG/1; MG/1
2 TABLET ORAL ONCE
Status: COMPLETED | OUTPATIENT
Start: 2022-01-20 | End: 2022-01-20

## 2022-01-20 RX ORDER — ONDANSETRON 4 MG/1
4 TABLET, ORALLY DISINTEGRATING ORAL ONCE
Status: COMPLETED | OUTPATIENT
Start: 2022-01-20 | End: 2022-01-20

## 2022-01-20 RX ORDER — OXYCODONE HYDROCHLORIDE AND ACETAMINOPHEN 5; 325 MG/1; MG/1
1 TABLET ORAL 2 TIMES DAILY
Status: DISCONTINUED | OUTPATIENT
Start: 2022-01-20 | End: 2022-01-21 | Stop reason: HOSPADM

## 2022-01-20 RX ORDER — M-VIT,TX,IRON,MINS/CALC/FOLIC 27MG-0.4MG
1 TABLET ORAL DAILY
Status: DISCONTINUED | OUTPATIENT
Start: 2022-01-20 | End: 2022-01-20

## 2022-01-20 RX ORDER — ALLOPURINOL 100 MG/1
100 TABLET ORAL DAILY
Status: DISCONTINUED | OUTPATIENT
Start: 2022-01-20 | End: 2022-01-21 | Stop reason: HOSPADM

## 2022-01-20 RX ORDER — METOPROLOL TARTRATE 50 MG/1
100 TABLET, FILM COATED ORAL DAILY
Status: DISCONTINUED | OUTPATIENT
Start: 2022-01-20 | End: 2022-01-21 | Stop reason: HOSPADM

## 2022-01-20 RX ORDER — POLYETHYLENE GLYCOL 3350 17 G/17G
17 POWDER, FOR SOLUTION ORAL DAILY PRN
Status: DISCONTINUED | OUTPATIENT
Start: 2022-01-20 | End: 2022-01-21 | Stop reason: HOSPADM

## 2022-01-20 RX ORDER — ACETAMINOPHEN 325 MG/1
650 TABLET ORAL EVERY 6 HOURS PRN
Status: DISCONTINUED | OUTPATIENT
Start: 2022-01-20 | End: 2022-01-21 | Stop reason: HOSPADM

## 2022-01-20 RX ADMIN — ALLOPURINOL 100 MG: 100 TABLET ORAL at 21:45

## 2022-01-20 RX ADMIN — OXYCODONE HYDROCHLORIDE AND ACETAMINOPHEN 1 TABLET: 5; 325 TABLET ORAL at 21:45

## 2022-01-20 RX ADMIN — SODIUM CHLORIDE 1000 ML: 9 INJECTION, SOLUTION INTRAVENOUS at 08:19

## 2022-01-20 RX ADMIN — SODIUM CHLORIDE, PRESERVATIVE FREE 10 ML: 5 INJECTION INTRAVENOUS at 21:46

## 2022-01-20 RX ADMIN — ASPIRIN 81 MG 324 MG: 81 TABLET ORAL at 09:43

## 2022-01-20 RX ADMIN — AMLODIPINE BESYLATE 10 MG: 5 TABLET ORAL at 21:45

## 2022-01-20 RX ADMIN — ONDANSETRON 4 MG: 4 TABLET, ORALLY DISINTEGRATING ORAL at 07:07

## 2022-01-20 RX ADMIN — OXYCODONE AND ACETAMINOPHEN 2 TABLET: 5; 325 TABLET ORAL at 07:07

## 2022-01-20 RX ADMIN — HEPARIN SODIUM 5000 UNITS: 5000 INJECTION INTRAVENOUS; SUBCUTANEOUS at 21:49

## 2022-01-20 RX ADMIN — ATORVASTATIN CALCIUM 10 MG: 10 TABLET, FILM COATED ORAL at 21:45

## 2022-01-20 RX ADMIN — METOPROLOL 100 MG: 50 TABLET ORAL at 21:45

## 2022-01-20 RX ADMIN — SODIUM CHLORIDE: 9 INJECTION, SOLUTION INTRAVENOUS at 22:38

## 2022-01-20 RX ADMIN — SODIUM CHLORIDE: 9 INJECTION, SOLUTION INTRAVENOUS at 09:43

## 2022-01-20 ASSESSMENT — ENCOUNTER SYMPTOMS
BACK PAIN: 1
CHEST TIGHTNESS: 0
DIARRHEA: 0
VOMITING: 0
EYE REDNESS: 0
SINUS PAIN: 0
EYE PAIN: 0
ABDOMINAL PAIN: 0
PHOTOPHOBIA: 0
SHORTNESS OF BREATH: 0
WHEEZING: 0
RHINORRHEA: 0
NAUSEA: 0
SORE THROAT: 0
COUGH: 0

## 2022-01-20 ASSESSMENT — PAIN DESCRIPTION - PAIN TYPE
TYPE: CHRONIC PAIN
TYPE: ACUTE PAIN

## 2022-01-20 ASSESSMENT — PAIN DESCRIPTION - DESCRIPTORS: DESCRIPTORS: HEADACHE

## 2022-01-20 ASSESSMENT — PAIN DESCRIPTION - LOCATION
LOCATION: HEAD;GENERALIZED
LOCATION: BACK;GENERALIZED

## 2022-01-20 ASSESSMENT — PAIN DESCRIPTION - FREQUENCY: FREQUENCY: CONTINUOUS

## 2022-01-20 ASSESSMENT — PAIN SCALES - GENERAL
PAINLEVEL_OUTOF10: 7
PAINLEVEL_OUTOF10: 5
PAINLEVEL_OUTOF10: 5

## 2022-01-20 NOTE — H&P
HOSPITALISTS HISTORY AND PHYSICAL    1/20/2022 10:03 AM    Patient Information:  Mary Phillips is a 80 y.o. female [id number]  PCP:  Raymundo Meehan MD (Tel: [phone] )    Chief complaint:    Chief Complaint   Patient presents with    Generalized Body Aches     From home. Body aches, headache, dizziness since Thanksgiving.  Back Pain     Upper back pain X4 years. Burning pain \"from shoulder to shoulder\". Was told it was muscle pain by urgent care dr.       History of Present Illness:  Sukumar Pichardo is a 80 y.o. female with morbid obesity, fibromyalgia, HTN, hypothyroidism, MARK who came to ER with generalized aches and back pain. Found to have MICHAEL in ED. No CP, SOB, abdominal pain, fevers, nausea, vomiting, melena, hematochezia, dysphagia or syncope. No focal weakness. No dysuria or hematuria. Larsen to be placed in ED. Otherwise complete ROS is negative unless listed above. REVIEW OF SYSTEMS:   Pertinent positives as noted in HPI. All other systems were reviewed and are negative. Past Medical History:   has a past medical history of Arthritis, Back pain, Colon polyps, Fibromyalgia, Gout, Hiatal hernia, Hyperlipidemia, Hypertension, Peripheral neuropathy, Spondylolisthesis, lumbar region, and Thyroid disease. Past Surgical History:   has a past surgical history that includes Hysterectomy; Thyroid surgery; Carpal tunnel release; Endoscopy, colon, diagnostic; lumbar fusion (N/A, 5/13/2019); lumbar fusion (N/A, 5/13/2019); and Colonoscopy (9/26/2019). Medications:  No current facility-administered medications on file prior to encounter.      Current Outpatient Medications on File Prior to Encounter   Medication Sig Dispense Refill    allopurinol (ZYLOPRIM) 100 MG tablet Take 100 mg by mouth daily      oxyCODONE-acetaminophen (PERCOCET) 5-325 MG per tablet Take 1 tablet by thrush. Trachea midline. Cardiovascular: Regular rhythm, normal S1, S2. No murmur, gallop, rub. No edema in lower extremities  Respiratory: Clear to auscultation bilaterally, no wheeze, good inspiratory effort  Gastrointestinal: Abdomen soft, obese, non-tender, not distended, normal bowel sounds  Musculoskeletal: No cyanosis in digits, neck supple  Neurology: Cranial nerves grossly intact. Alert and oriented in time, place and person. No speech or motor deficits  Psychiatry: Appropriate affect. Not agitated  Skin: Warm, dry, normal turgor, no rash  Brisk capillary refill, peripheral pulses palpable   Labs:  CBC:   Lab Results   Component Value Date    WBC 14.6 01/20/2022    RBC 4.79 01/20/2022    HGB 11.6 01/20/2022    HCT 36.2 01/20/2022    MCV 75.7 01/20/2022    MCH 24.3 01/20/2022    MCHC 32.2 01/20/2022    RDW 18.6 01/20/2022     01/20/2022    MPV 8.3 01/20/2022     BMP:    Lab Results   Component Value Date     01/20/2022    K 4.0 01/20/2022    CL 98 01/20/2022    CO2 23 01/20/2022    BUN 52 01/20/2022    CREATININE 1.6 01/20/2022    CALCIUM 9.9 01/20/2022    GFRAA 38 01/20/2022    LABGLOM 32 01/20/2022    GLUCOSE 147 01/20/2022     XR CHEST (2 VW)   Final Result   Bibasilar atelectasis. CT Head WO Contrast   Final Result   No acute intracranial abnormality. Problem List  Active Problems:    MARK (obstructive sleep apnea)    Morbid obesity with BMI of 45.0-49.9, adult (HCC)    MICHAEL (acute kidney injury) ([facility] [address]    Leukocytosis    Anemia  Resolved Problems:    * No resolved hospital problems. *        Assessment/Plan:   1. Larsen in ED  2. Check UA to r/o UTI  3. Renal consult for MICHAEL  4. IVF  5. Droplet plus until COVID r/o  6. Check Echo for SOB  7. Morphine IV PRN pain  8. PT/OT eval      DVT prophylaxis Hep SQ  Code status Full code  Diet General  IV access Peripheral  Larsen Catheter Yes in ED    Admit as inpatient.  I anticipate hospitalization spanning more than two midnights for investigation and treatment of the above medically necessary diagnoses. Discussed with patient, ED nursing and . Will see how she does.     Ted Hood MD    1/20/2022 10:03 AM

## 2022-01-20 NOTE — CONSULTS
Nephrology Consult Note  [phone]   [facility]           Reason for Consult:  Acute Kidney injury     HISTORY OF PRESENT ILLNESS:                The patient is a 80 y. o.female with significant past medical history of Hypertension, Fibromyalgia, Gout, Hyperlipidemia, Hiatal hernia, MARK, h/o Hysterectomy, h/o lumbar fusion,  came to ER with c/o generalized weakness, myalgias, and back pain. Symptoms have been ongoing for the past 2 months or so. Labs showed MICHAEL and is being admitted for further evaluation. Being tested for COVID-19  CT head with no acute changes  We are consulted for MICHAEL  Baseline creatinine appears to be less than 1  Creatinine on admission was 1.6  Last creatinine prior to admission was 0.7 in June 2021  Denies urinary problems   NSAID usage except very occasionally but naproxen listed in home meds  Of note, lasix, tamsulosin listed in home meds      Past Medical History:        Diagnosis Date    Arthritis     Back pain     Colon polyps     Fibromyalgia     Gout     Hiatal hernia     Hyperlipidemia     Hypertension     Peripheral neuropathy     Spondylolisthesis, lumbar region     Thyroid disease        Past Surgical History:        Procedure Laterality Date    CARPAL TUNNEL RELEASE      and nerve surgery    COLONOSCOPY  9/26/2019    COLONOSCOPY POLYPECTOMY SNARE/COLD BIOPSY performed by Aida Winter MD at [address], COLON, DIAGNOSTIC      HYSTERECTOMY      LUMBAR FUSION N/A 5/13/2019    L2-3, L3-4 LATERAL LUMBAR INTERBODY FUSION performed by Halie Ramsey MD at [facility] N/A 5/13/2019    L4-5 TRANSFORAMINAL LUMBAR INTERBODY FUSION WITH PEDICLE SCREW performed by Halie Ramsey MD at [place] [address]         No current facility-administered medications on file prior to encounter.      Current Outpatient Medications on File Prior to Encounter   Medication Sig Dispense Refill    allopurinol (ZYLOPRIM) Other Topics Concern    Not on file   Social History Narrative    Not on file     Social Determinants of Health     Financial Resource Strain:     Difficulty of Paying Living Expenses: Not on file   Food Insecurity:     Worried About Running Out of Food in the Last Year: Not on file    Luis A of Food in the Last Year: Not on file   Transportation Needs:     Lack of Transportation (Medical): Not on file    Lack of Transportation (Non-Medical): Not on file   Physical Activity:     Days of Exercise per Week: Not on file    Minutes of Exercise per Session: Not on file   Stress:     Feeling of Stress : Not on file   Social Connections:     Frequency of Communication with Friends and Family: Not on file    Frequency of Social Gatherings with Friends and Family: Not on file    Attends Yazidi Services: Not on file    Active Member of [address] [place] [address] or Organizations: Not on file    Attends Club or Organization Meetings: Not on file    Marital Status: Not on file   Intimate Partner Violence:     Fear of Current or Ex-Partner: Not on file    Emotionally Abused: Not on file    Physically Abused: Not on file    Sexually Abused: Not on file   Housing Stability:     Unable to Pay for Housing in the Last Year: Not on file    Number of [place] in the Last Year: Not on file    Unstable Housing in the Last Year: Not on file       Family History:       Problem Relation Age of Onset    Diabetes Mother            Review of Systems   Constitutional: Positive for activity change, appetite change and fatigue. Negative for chills and fever. HENT: Negative for ear discharge, ear pain, postnasal drip, rhinorrhea, sinus pain, sneezing, sore throat and tinnitus. Eyes: Negative for photophobia, pain, redness and visual disturbance. Respiratory: Negative for cough, chest tightness, shortness of breath and wheezing. Cardiovascular: Negative for chest pain, palpitations and leg swelling.    Gastrointestinal: Negative for abdominal pain, diarrhea, nausea and vomiting. Endocrine: Negative for polydipsia, polyphagia and polyuria. Genitourinary: Negative for difficulty urinating, dysuria, enuresis, hematuria and urgency. Musculoskeletal: Positive for arthralgias, back pain and myalgias. Skin: Negative for pallor and rash. Neurological: Positive for dizziness and headaches. Negative for tremors, seizures and syncope. Psychiatric/Behavioral: Negative for confusion and hallucinations. PHYSICAL EXAM:      Vitals:    BP (!) 121/91   Pulse 75   Temp 98.9 °F (37.2 °C) (Oral)   Resp 14   Ht 5' 4\" (1.626 m)   Wt 281 lb (127.5 kg)   SpO2 100%   BMI 48.23 kg/m²   No intake/output data recorded. No intake/output data recorded. Physical Exam:  General : AAOx3, not in pain or respiratory distress, resting in bed  HEENT : normocephalic, atraumatic, mucosa moist, no palor or icterus  CVS: S1 S2 normal, regular rhythm, no murmurs or rubs. Lungs: Clear, no wheezing or crackles. Abd: Soft, bowel sounds normal, non-tender. Ext: No edema, no cyanosis  Skin: Warm. No rashes appreciated. : bladder non-distended, no tenderness over the bladder, mendes with clear urine  Neuro: Alert and oriented x 3, nonfocal.  Joints: No erythema noted over joints.       DATA:    CBC with Differential:    Lab Results   Component Value Date    WBC 14.6 01/20/2022    RBC 4.79 01/20/2022    HGB 11.6 01/20/2022    HCT 36.2 01/20/2022     01/20/2022    MCV 75.7 01/20/2022    MCH 24.3 01/20/2022    MCHC 32.2 01/20/2022    RDW 18.6 01/20/2022    LYMPHOPCT 27.4 01/20/2022    MONOPCT 8.4 01/20/2022    BASOPCT 0.4 01/20/2022    MONOSABS 1.2 01/20/2022    LYMPHSABS 4.0 01/20/2022    EOSABS 0.1 01/20/2022    BASOSABS 0.1 01/20/2022     BMP:    Lab Results   Component Value Date     01/20/2022    K 4.0 01/20/2022    CL 98 01/20/2022    CO2 23 01/20/2022    BUN 52 01/20/2022    LABALBU 3.9 01/20/2022    CREATININE 1.6 01/20/2022 CALCIUM 9.9 01/20/2022    GFRAA 38 01/20/2022    LABGLOM 32 01/20/2022    GLUCOSE 147 01/20/2022     Ionized Calcium:  No results found for: IONCA  Magnesium:    Lab Results   Component Value Date    MG 1.40 06/03/2021     Phosphorus:    Lab Results   Component Value Date    PHOS 5.4 05/14/2019     Troponin:    Lab Results   Component Value Date    TROPONINI 0.02 01/20/2022     Last 3 Troponin:    Lab Results   Component Value Date    TROPONINI 0.02 01/20/2022    TROPONINI <0.01 06/03/2021    TROPONINI <0.01 07/20/2020     U/A:    Lab Results   Component Value Date    COLORU YELLOW 01/20/2022    PROTEINU Negative 01/20/2022    PHUR 5.0 01/20/2022    WBCUA 8 06/03/2021    RBCUA 2 06/03/2021    BACTERIA 3+ 06/03/2021    CLARITYU Clear 01/20/2022    SPECGRAV 1.022 01/20/2022    LEUKOCYTESUR Negative 01/20/2022    UROBILINOGEN 0.2 01/20/2022    BILIRUBINUR SMALL 01/20/2022    BLOODU Negative 01/20/2022    GLUCOSEU Negative 01/20/2022       ASSESSMENT/PLAN:      1. MICHAEL   Suspect pre-renal vs ATN  UA consistent with pre-renal state, no protein or blood. Check renal US  Avoid nephrotoxins including IV contrast, NSAIDs, ACEI/ARBs. Continue to hold furosemide and lisinopril  Continue IVF  Daily BMP  2. Hypertension controlled  Continue to hold lisinopril. 3. Shortness of breath  TTE pending  CXR with no acute changes      Thank you for allowing me to participate in the care of this patient. I will continue to follow along. Please call with questions.     Carmela Douglas MD, MD.  Office Phone : [phone]  1/20/2022

## 2022-01-20 NOTE — PROGRESS NOTES
Pt seen in  ED, admission completed. Pt is alert and oriented x 4. Pt lives at home with her , and is being admitted for MICHAEL. Plan of care updated, all questions answered.

## 2022-01-20 NOTE — PROGRESS NOTES
Nephrology Consult Note  [phone]   [facility] [place]. [email or web address]      Consult received. Full note to follow. Thank you for consulting [facility]. Please call with questions.

## 2022-01-20 NOTE — ED PROVIDER NOTES
EMERGENCY DEPARTMENT PROVIDER NOTE    Patient Identification  Pt Name: Isabella Wilson  MRN: [id number]  [place] 1950  Date of evaluation: 1/20/2022  Provider: Sreedhar Orlando DO  PCP: Clare Mortensen MD    Chief Complaint  Generalized Body Aches (From home. Body aches, headache, dizziness since Thanksgiving. ) and Back Pain (Upper back pain X4 years. Burning pain \"from shoulder to shoulder\". Was told it was muscle pain by urgent care dr.)      HPI  (History provided by patient)  This is a 80 y.o. female with pertinent past medical history of arthritis, fibromyalgia, high cholesterol, hypertension who was brought in by family for generalized body aches ongoing for the past week. Associated with aching diffuse headache, moderate intensity, nothing seems to make this any better or worse. Patient also reports feeling lightheaded ongoing off and on for the past 2 months. She has had midthoracic back pain, chronic however worsened over the past week as well. Feels she has fluid in her left chest.  Denies any trauma or injury. Denies any fevers, chills, cough or shortness of breath.     ROS    Const:  No fevers, no chills, +generalized weakness  Skin:  No rash, no lesions  Eyes:  No visual changes, no blurry or double vision, no pain  ENT:  No sore throat, no difficulty swallowing, no ear pain, no sinus pain or congestion  Card:  No chest pain, no palpitations, no edema  Resp:  No shortness of breath, no cough, no wheezing  Abd:  No abdominal pain, no nausea, no vomiting, no diarrhea  Genitourinary:  No dysuria, no hematuria, no vaginal discharge, no vaginal bleeding  MSK:  +joint pain, +myalgia  Neuro:  No focal weakness, +headache, no paresthesia    All other systems reviewed and negative unless otherwise noted in HPI    I have reviewed the following nursing documentation:  Allergies: Lisinopril    Past medical history:   Past Medical History:   Diagnosis Date    Arthritis     Back pain     Colon polyps     Fibromyalgia     Gout     Hiatal hernia     Hyperlipidemia     Hypertension     Peripheral neuropathy     Spondylolisthesis, lumbar region     Thyroid disease      Past surgical history:   Past Surgical History:   Procedure Laterality Date    CARPAL TUNNEL RELEASE      and nerve surgery    COLONOSCOPY  9/26/2019    COLONOSCOPY POLYPECTOMY SNARE/COLD BIOPSY performed by Nghia De Jesus MD at [address], COLON, DIAGNOSTIC      HYSTERECTOMY      LUMBAR FUSION N/A 5/13/2019    L2-3, L3-4 LATERAL LUMBAR INTERBODY FUSION performed by Paulino Neely MD at [facility] N/A 5/13/2019    L4-5 TRANSFORAMINAL LUMBAR INTERBODY FUSION WITH PEDICLE SCREW performed by Paulion Neely MD at [address] [place] [address] medications:   Previous Medications    ALLOPURINOL (ZYLOPRIM) 100 MG TABLET    Take 100 mg by mouth daily    AMLODIPINE (NORVASC) 10 MG TABLET    Take 10 mg by mouth daily. ATORVASTATIN (LIPITOR) 10 MG TABLET    Take 10 mg by mouth daily    CHOLECALCIFEROL (VITAMIN D3) 1000 UNITS CAPS    Take 1 capsule by mouth daily    FUROSEMIDE (LASIX) 80 MG TABLET    Take 40 mg by mouth daily     GABAPENTIN (NEURONTIN) 600 MG TABLET    Take 600 mg by mouth 3 times daily as needed. LATANOPROST (XALATAN) 0.005 % OPHTHALMIC SOLUTION    1 drop nightly    LEVOTHYROXINE (SYNTHROID) 150 MCG TABLET    Take 150 mcg by mouth Daily.     METOPROLOL (LOPRESSOR) 100 MG TABLET    Take 100 mg by mouth daily    MULTIPLE VITAMINS-MINERALS (THERAPEUTIC MULTIVITAMIN-MINERALS) TABLET    Take 1 tablet by mouth daily    NAPROXEN (NAPROSYN) 500 MG TABLET    Take 1 tablet by mouth 2 times daily as needed for Pain    NAPROXEN (NAPROSYN) 500 MG TABLET    Take 1 tablet by mouth 2 times daily (with meals)    NIACIN (VITAMIN B-3 PO)    Take by mouth    ONDANSETRON (ZOFRAN-ODT) 4 MG DISINTEGRATING TABLET    Take 1 tablet by mouth 3 times daily as needed for Nausea or Vomiting OXYCODONE-ACETAMINOPHEN (PERCOCET) 5-325 MG PER TABLET    Take 1 tablet by mouth 2 times daily. POTASSIUM CHLORIDE (KLOR-CON M) 20 MEQ EXTENDED RELEASE TABLET    Take 20 mEq by mouth daily    TAMSULOSIN (FLOMAX) 0.4 MG CAPSULE    Take 2 capsules by mouth daily for 7 days       Social history:  reports that she has never smoked. She has never used smokeless tobacco. She reports that she does not drink alcohol and does not use drugs. Family history:    Family History   Problem Relation Age of Onset    Diabetes Mother          Exam  ED Triage Vitals [01/20/22 0626]   BP Temp Temp Source Pulse Resp SpO2 Height Weight   130/83 98.9 °F (37.2 °C) Oral 89 18 97 % 5' 4\" (1.626 m) 281 lb (127.5 kg)     Nursing note and vitals reviewed. Constitutional: Well developed, well nourished. Non-toxic in appearance. HENT:      Head: Normocephalic and atraumatic. Ears: External ears normal.      Nose: Nose normal.     Mouth: Membrane mucosa moist and pink. Eyes: Anicteric sclera. No discharge. PERRL  Neck: Supple. Trachea midline. No meningismus  Cardiovascular: RRR; no murmurs, rubs, or gallops. Pulmonary/Chest: Effort normal. No respiratory distress. CTAB. No stridor. No wheezes. No rales. Abdominal: Soft. Protuberant. Nontender to deep palpation all quadrants. Musculoskeletal: Moves all extremities. No gross deformity. Neurological: Alert and orientedx4. Face symmetric. Speech is clear. CN 2-12 intact. 5/5 motor and sensation grossly intact all extremities. No pronator drift. Normal finger to nose, normal heel to shin. Normal gait observed. Skin: Warm and dry. No rash. Psychiatric: Normal mood and affect.  Behavior is normal.    Procedures      EKG    EKG was reviewed by emergency department physician in the absence of a cardiologist    Narrow complex sinus rhythm, rate 79, normal axis, normal NH and QRS intervals, normal Qtc, no ST elevations or depressions, normal t-wave morphology, impression NSR, no STEMI      Radiology  XR CHEST (2 VW)   Final Result   Bibasilar atelectasis. CT Head WO Contrast   Final Result   No acute intracranial abnormality.              Labs  Results for orders placed or performed during the hospital encounter of 01/20/22   CBC Auto Differential   Result Value Ref Range    WBC 14.6 (H) 4.0 - 11.0 K/uL    RBC 4.79 4.00 - 5.20 M/uL    Hemoglobin 11.6 (L) 12.0 - 16.0 g/dL    Hematocrit 36.2 36.0 - 48.0 %    MCV 75.7 (L) 80.0 - 100.0 fL    MCH 24.3 (L) 26.0 - 34.0 pg    MCHC 32.2 31.0 - 36.0 g/dL    RDW 18.6 (H) 12.4 - 15.4 %    Platelets 061 249 - 768 K/uL    MPV 8.3 5.0 - 10.5 fL    Neutrophils % 63.5 %    Lymphocytes % 27.4 %    Monocytes % 8.4 %    Eosinophils % 0.3 %    Basophils % 0.4 %    Neutrophils Absolute 9.3 (H) 1.7 - 7.7 K/uL    Lymphocytes Absolute 4.0 1.0 - 5.1 K/uL    Monocytes Absolute 1.2 0.0 - 1.3 K/uL    Eosinophils Absolute 0.1 0.0 - 0.6 K/uL    Basophils Absolute 0.1 0.0 - 0.2 K/uL   Comprehensive Metabolic Panel w/ Reflex to MG   Result Value Ref Range    Sodium 137 136 - 145 mmol/L    Potassium reflex Magnesium 4.0 3.5 - 5.1 mmol/L    Chloride 98 (L) 99 - 110 mmol/L    CO2 23 21 - 32 mmol/L    Anion Gap 16 3 - 16    Glucose 147 (H) 70 - 99 mg/dL    BUN 52 (H) 7 - 20 mg/dL    CREATININE 1.6 (H) 0.6 - 1.2 mg/dL    GFR Non- 32 (A) >60    GFR  38 (A) >60    Calcium 9.9 8.3 - 10.6 mg/dL    Total Protein 7.6 6.4 - 8.2 g/dL    Albumin 3.9 3.4 - 5.0 g/dL    Albumin/Globulin Ratio 1.1 1.1 - 2.2    Total Bilirubin <0.2 0.0 - 1.0 mg/dL    Alkaline Phosphatase 81 40 - 129 U/L    ALT 26 10 - 40 U/L    AST 26 15 - 37 U/L   Troponin   Result Value Ref Range    Troponin 0.02 (H) <0.01 ng/mL   Brain Natriuretic Peptide   Result Value Ref Range    Pro-BNP 26 0 - 124 pg/mL   Procalcitonin   Result Value Ref Range    Procalcitonin 0.07 0.00 - 0.15 ng/mL   EKG 12 Lead   Result Value Ref Range    Ventricular Rate 79 BPM    Atrial Rate 79 BPM    P-R Interval 150 ms    QRS Duration 82 ms    Q-T Interval 398 ms    QTc Calculation (Bazett) 456 ms    P Axis 84 degrees    R Axis -19 degrees    T Axis 48 degrees    Diagnosis       Normal sinus rhythm with sinus arrhythmiaModerate voltage criteria for LVH, may be normal variantBorderline ECG       Screenings           MDM and ED Course    Patient afebrile and nontoxic. No distress. EKG without evidence of acute ischemia. No malignant dysrhythmia. Neuro exam benign, nothing to suggest CVA/TIA. CT head obtained for atypical headache shows no hemorrhage, mass-effect or other acute process. Given patient's symptoms ongoing for a week or more, I have very low suspicion for SAH/ICH. Nothing to suggest CNS infection. Lab work-up with nonspecific leukocytosis, no clear evidence of infection however urinalysis remains pending. Abdomen is benign. Chest x-ray without evidence of pneumonia, pneumothorax or other acute process. Labs also with acute kidney injury, likely prerenal.  Patient does have minimally elevated troponin of 0.02, nonischemic EKG, no chest pain, very low suspicion for ACS and I suspect elevated troponin is secondary to her MICHAEL. Patient received IV fluids. Will give one-time dose aspirin. I discussed case in person with Dr. Kojo Loya for nephrology who will evaluate patient. Case also discussed with internal medicine team who will admit. Patient remained alert, hemodynamically stable and in no distress over her emergency department course. Final Impression  1. Nonintractable headache, unspecified chronicity pattern, unspecified headache type    2. Myalgia    3. Acute kidney injury ([address]    4. Elevated troponin        Blood pressure 128/89, pulse 74, temperature 98.9 °F (37.2 °C), temperature source Oral, resp. rate 12, height 5' 4\" (1.626 m), weight 281 lb (127.5 kg), SpO2 90 %.      Disposition:  DISPOSITION Decision To Admit 01/20/2022 08:53:12 AM      Patient Referrals:  No follow-up provider specified. Discharge Medications:  New Prescriptions    No medications on file       Discontinued Medications:  Discontinued Medications    No medications on file       This chart was generated using the [facility] dictation system. I created this record but it may contain dictation errors given the limitations of this technology.     Felipe Reeder DO (electronically signed)  Attending Emergency Physician       Felipe Reeder DO  01/20/22 1640

## 2022-01-20 NOTE — ACP (ADVANCE CARE PLANNING)
Advanced Care Planning Note. Purpose of Encounter: Advanced care planning in light of MICHAEL  Parties In Attendance: Patient,   Decisional Capacity: Yes  Subjective: Patient is weak and tired  Objective: Cr 1.6  Goals of Care Determination: Patient wants full support (CPR, vent, surgery, HD, trach, PEG)  Plan:  IVF, Larsen, PT/OT eval, Renal consult, Echo  Code Status: Full code   Time spent on Advanced care Plannin minutes  Advanced Care Planning Documents: Completed advanced directives on chart,  is the POA.     Harsha Diane MD  2022 1:54 PM

## 2022-01-21 ENCOUNTER — APPOINTMENT (OUTPATIENT)
Dept: ULTRASOUND IMAGING | Age: 72
DRG: 683 | End: 2022-01-21
Payer: MEDICARE

## 2022-01-21 VITALS
DIASTOLIC BLOOD PRESSURE: 88 MMHG | RESPIRATION RATE: 18 BRPM | WEIGHT: 293 LBS | HEIGHT: 64 IN | BODY MASS INDEX: 50.02 KG/M2 | TEMPERATURE: 98.3 F | HEART RATE: 75 BPM | SYSTOLIC BLOOD PRESSURE: 148 MMHG | OXYGEN SATURATION: 97 %

## 2022-01-21 LAB
A/G RATIO: 1.2 (ref 1.1–2.2)
ALBUMIN SERPL-MCNC: 3.6 G/DL (ref 3.4–5)
ALP BLD-CCNC: 81 U/L (ref 40–129)
ALT SERPL-CCNC: 36 U/L (ref 10–40)
ANION GAP SERPL CALCULATED.3IONS-SCNC: 11 MMOL/L (ref 3–16)
APTT: 25.2 SEC (ref 26.2–38.6)
AST SERPL-CCNC: 88 U/L (ref 15–37)
BASOPHILS ABSOLUTE: 0 K/UL (ref 0–0.2)
BASOPHILS RELATIVE PERCENT: 0.4 %
BILIRUB SERPL-MCNC: 0.4 MG/DL (ref 0–1)
BUN BLDV-MCNC: 37 MG/DL (ref 7–20)
C-REACTIVE PROTEIN: 15.6 MG/L (ref 0–5.1)
CALCIUM SERPL-MCNC: 9.2 MG/DL (ref 8.3–10.6)
CHLORIDE BLD-SCNC: 103 MMOL/L (ref 99–110)
CO2: 28 MMOL/L (ref 21–32)
CREAT SERPL-MCNC: 1 MG/DL (ref 0.6–1.2)
D DIMER: 479 NG/ML DDU (ref 0–229)
EOSINOPHILS ABSOLUTE: 0.1 K/UL (ref 0–0.6)
EOSINOPHILS RELATIVE PERCENT: 0.5 %
FERRITIN: 123.3 NG/ML (ref 15–150)
FIBRINOGEN: 494 MG/DL (ref 200–397)
GFR AFRICAN AMERICAN: >60
GFR NON-AFRICAN AMERICAN: 55
GLUCOSE BLD-MCNC: 139 MG/DL (ref 70–99)
HCT VFR BLD CALC: 34.1 % (ref 36–48)
HEMOGLOBIN: 10.8 G/DL (ref 12–16)
INR BLD: 0.97 (ref 0.88–1.12)
LACTIC ACID: 1.6 MMOL/L (ref 0.4–2)
LV EF: 68 %
LVEF MODALITY: NORMAL
LYMPHOCYTES ABSOLUTE: 3.3 K/UL (ref 1–5.1)
LYMPHOCYTES RELATIVE PERCENT: 29.5 %
MCH RBC QN AUTO: 24.3 PG (ref 26–34)
MCHC RBC AUTO-ENTMCNC: 31.7 G/DL (ref 31–36)
MCV RBC AUTO: 76.8 FL (ref 80–100)
MONOCYTES ABSOLUTE: 0.8 K/UL (ref 0–1.3)
MONOCYTES RELATIVE PERCENT: 7.1 %
NEUTROPHILS ABSOLUTE: 7 K/UL (ref 1.7–7.7)
NEUTROPHILS RELATIVE PERCENT: 62.5 %
PDW BLD-RTO: 18.5 % (ref 12.4–15.4)
PLATELET # BLD: 161 K/UL (ref 135–450)
PMV BLD AUTO: 8.4 FL (ref 5–10.5)
POTASSIUM REFLEX MAGNESIUM: 4.5 MMOL/L (ref 3.5–5.1)
PROTHROMBIN TIME: 11 SEC (ref 9.9–12.7)
RBC # BLD: 4.44 M/UL (ref 4–5.2)
SARS-COV-2: NOT DETECTED
SODIUM BLD-SCNC: 142 MMOL/L (ref 136–145)
TOTAL PROTEIN: 6.5 G/DL (ref 6.4–8.2)
TROPONIN: <0.01 NG/ML
WBC # BLD: 11.2 K/UL (ref 4–11)

## 2022-01-21 PROCEDURE — 97165 OT EVAL LOW COMPLEX 30 MIN: CPT

## 2022-01-21 PROCEDURE — 97535 SELF CARE MNGMENT TRAINING: CPT

## 2022-01-21 PROCEDURE — 85379 FIBRIN DEGRADATION QUANT: CPT

## 2022-01-21 PROCEDURE — 86140 C-REACTIVE PROTEIN: CPT

## 2022-01-21 PROCEDURE — 85384 FIBRINOGEN ACTIVITY: CPT

## 2022-01-21 PROCEDURE — 80053 COMPREHEN METABOLIC PANEL: CPT

## 2022-01-21 PROCEDURE — 85025 COMPLETE CBC W/AUTO DIFF WBC: CPT

## 2022-01-21 PROCEDURE — 93306 TTE W/DOPPLER COMPLETE: CPT

## 2022-01-21 PROCEDURE — 97161 PT EVAL LOW COMPLEX 20 MIN: CPT

## 2022-01-21 PROCEDURE — 85730 THROMBOPLASTIN TIME PARTIAL: CPT

## 2022-01-21 PROCEDURE — 83605 ASSAY OF LACTIC ACID: CPT

## 2022-01-21 PROCEDURE — 85610 PROTHROMBIN TIME: CPT

## 2022-01-21 PROCEDURE — G0378 HOSPITAL OBSERVATION PER HR: HCPCS

## 2022-01-21 PROCEDURE — 97530 THERAPEUTIC ACTIVITIES: CPT

## 2022-01-21 PROCEDURE — 84484 ASSAY OF TROPONIN QUANT: CPT

## 2022-01-21 PROCEDURE — 76770 US EXAM ABDO BACK WALL COMP: CPT

## 2022-01-21 PROCEDURE — 2580000003 HC RX 258: Performed by: INTERNAL MEDICINE

## 2022-01-21 PROCEDURE — 36415 COLL VENOUS BLD VENIPUNCTURE: CPT

## 2022-01-21 PROCEDURE — 6370000000 HC RX 637 (ALT 250 FOR IP): Performed by: INTERNAL MEDICINE

## 2022-01-21 PROCEDURE — 6360000002 HC RX W HCPCS: Performed by: INTERNAL MEDICINE

## 2022-01-21 PROCEDURE — 96372 THER/PROPH/DIAG INJ SC/IM: CPT

## 2022-01-21 RX ADMIN — HEPARIN SODIUM 5000 UNITS: 5000 INJECTION INTRAVENOUS; SUBCUTANEOUS at 05:56

## 2022-01-21 RX ADMIN — METOPROLOL 100 MG: 50 TABLET ORAL at 08:57

## 2022-01-21 RX ADMIN — HEPARIN SODIUM 5000 UNITS: 5000 INJECTION INTRAVENOUS; SUBCUTANEOUS at 13:52

## 2022-01-21 RX ADMIN — OXYCODONE HYDROCHLORIDE AND ACETAMINOPHEN 1 TABLET: 5; 325 TABLET ORAL at 08:57

## 2022-01-21 RX ADMIN — LEVOTHYROXINE SODIUM 150 MCG: 0.1 TABLET ORAL at 05:57

## 2022-01-21 RX ADMIN — ALLOPURINOL 100 MG: 100 TABLET ORAL at 08:57

## 2022-01-21 RX ADMIN — SODIUM CHLORIDE, PRESERVATIVE FREE 10 ML: 5 INJECTION INTRAVENOUS at 08:58

## 2022-01-21 RX ADMIN — AMLODIPINE BESYLATE 10 MG: 5 TABLET ORAL at 08:57

## 2022-01-21 RX ADMIN — ATORVASTATIN CALCIUM 10 MG: 10 TABLET, FILM COATED ORAL at 08:57

## 2022-01-21 ASSESSMENT — PAIN DESCRIPTION - LOCATION
LOCATION: BACK;FOOT
LOCATION: BACK;FOOT
LOCATION: BACK

## 2022-01-21 ASSESSMENT — PAIN DESCRIPTION - ONSET: ONSET: ON-GOING

## 2022-01-21 ASSESSMENT — PAIN SCALES - GENERAL
PAINLEVEL_OUTOF10: 5
PAINLEVEL_OUTOF10: 0
PAINLEVEL_OUTOF10: 0
PAINLEVEL_OUTOF10: 5
PAINLEVEL_OUTOF10: 0
PAINLEVEL_OUTOF10: 5
PAINLEVEL_OUTOF10: 0
PAINLEVEL_OUTOF10: 0

## 2022-01-21 ASSESSMENT — PAIN - FUNCTIONAL ASSESSMENT: PAIN_FUNCTIONAL_ASSESSMENT: ACTIVITIES ARE NOT PREVENTED

## 2022-01-21 ASSESSMENT — PAIN DESCRIPTION - PROGRESSION: CLINICAL_PROGRESSION: NOT CHANGED

## 2022-01-21 ASSESSMENT — PAIN DESCRIPTION - FREQUENCY
FREQUENCY: CONTINUOUS
FREQUENCY: CONTINUOUS

## 2022-01-21 ASSESSMENT — PAIN DESCRIPTION - ORIENTATION: ORIENTATION: MID;LOWER

## 2022-01-21 ASSESSMENT — PAIN DESCRIPTION - PAIN TYPE
TYPE: CHRONIC PAIN
TYPE: CHRONIC PAIN

## 2022-01-21 ASSESSMENT — PAIN DESCRIPTION - DESCRIPTORS: DESCRIPTORS: ACHING

## 2022-01-21 NOTE — PROGRESS NOTES
Occupational Therapy   Occupational Therapy Initial Assessment and Discharge Summary   Date: 2022   Patient Name: Soheila Osborn  MRN: [id number]     : 1950    Date of Service: 2022    Discharge Recommendations: Soheila Osborn scored a 20/24 on the AM-PAC ADL Inpatient form. Current research shows that an AM-PAC score of 18 or greater is typically associated with a discharge to the patient's home setting. Based on the patient's AM-PAC score, and their current ADL deficits, it is recommended that the patient have 2-3 sessions per week of Occupational Therapy at d/c to increase the patient's independence. At this time, this patient demonstrates the endurance and safety to discharge home with home OT  and a follow up treatment frequency of 2-3x/wk. Please see assessment section for further patient specific details. Recommend home OT for DME equipment evaluation due to patient stated has been stuck in bathtub and  has to help get out of tub. Home OT to determine best type of shower chair/tub bench, grab bars and if would benefit from high commode and possibly a reacher/ADL equipment. No further acute care OT indicated due to functioning at baseline. If patient discharges prior to next session this note will serve as a discharge summary. Please see below for the latest assessment towards goals. S Level 1  OT Equipment Recommendations  Equipment Needed: Yes  Mobility Devices: ADL Assistive Devices  ADL Assistive Devices: Grab Bars - tub; Toileting - Raised Toilet Seat with arms;Transfer Tub Bench    Assessment   Assessment: Patient reports is nearly back to baseline for ADLs and mobility, Reports no longer having shoulder pain or headache. Patient stated needs a shower chair and grab bars.  Agreeable for home OT to come for a home visit to determine what best equipment is needed (transfer tub bench, grab bars, higher commode) due to chronic back and health issues  Treatment Diagnosis: Debility and decreased ADLs due to myalgia  Prognosis: Good  Decision Making: Low Complexity  History:  helps ADLs if needed, reports slow decline since Thanksgiving  Exam: reports baseline for ADLs, SBA with 4WW due to h/o LE weakness  Assistance / Modification: Stable presentation  OT Education: OT Role;Plan of Care;ADL Adaptive Strategies  Patient Education: D/c planning, adapted equipment  REQUIRES OT FOLLOW UP: No  Activity Tolerance  Activity Tolerance: Patient Tolerated treatment well  Safety Devices  Safety Devices in place: Yes  Type of devices: All fall risk precautions in place;Nurse notified;Call light within reach; Left in chair;Chair alarm in place  Restraints  Initially in place: No           Patient Diagnosis(es): The primary encounter diagnosis was Nonintractable headache, unspecified chronicity pattern, unspecified headache type. Diagnoses of Myalgia, Acute kidney injury ([facility] [address] and Elevated troponin were also pertinent to this visit. has a past medical history of Arthritis, Back pain, Colon polyps, Fibromyalgia, Gout, Hiatal hernia, Hyperlipidemia, Hypertension, Peripheral neuropathy, Spondylolisthesis, lumbar region, and Thyroid disease. has a past surgical history that includes Hysterectomy; Thyroid surgery; Carpal tunnel release; Endoscopy, colon, diagnostic; lumbar fusion (N/A, 5/13/2019); lumbar fusion (N/A, 5/13/2019); and Colonoscopy (9/26/2019). Treatment Diagnosis: Debility and decreased ADLs due to myalgia      Restrictions  Restrictions/Precautions  Restrictions/Precautions: Fall Risk,Isolation (COVID r/o)  Required Braces or Orthoses?: No  Position Activity Restriction  Other position/activity restrictions: The patient is a 80 y. o.female with significant past medical history of Hypertension, Fibromyalgia, Gout, Hyperlipidemia, Hiatal hernia, MARK, h/o Hysterectomy, h/o lumbar fusion,  came to ER with c/o generalized weakness, myalgias, and back pain.  Symptoms have been ongoing for the past 2 months or so. Labs showed MICHAEL and is being admitted for further evaluation. Being tested for COVID-19    Subjective   General  Chart Reviewed: Yes  Patient assessed for rehabilitation services?: Yes  Family / Caregiver Present: No  Diagnosis: Myalgia, MICHAEL  Subjective  Subjective: Patient supine in bed, pleasant and cooperative  General Comment  Comments: Reports chronic back pain and left foot pain 5/10  Patient Currently in Pain: Yes  Pain Assessment  Pain Assessment: 0-10  Pain Level: 5  Patient's Stated Pain Goal: No pain  Pain Type: Chronic pain  Pain Location: Back; Foot  Pain Orientation: Mid;Lower  Pain Descriptors: Aching  Pain Frequency: Continuous  Pain Onset: On-going  Clinical Progression: Not changed  Functional Pain Assessment: Activities are not prevented  Non-Pharmaceutical Pain Intervention(s): Rest;Repositioned  Vital Signs  Patient Currently in Pain: Yes  Social/Functional History  Social/Functional History  Lives With: Spouse  Type of Home: House  Home Layout: Two level (bi-level)  Home Access: Level entry  Entrance Stairs - Number of Steps: bi-level house, 6 steps once inside house  Bathroom Shower/Tub: Tub/Shower unit  Bathroom Toilet: Standard  Bathroom Accessibility: Walker accessible  Home Equipment: 4 wheeled walker,Cane,Wheelchair-manual  Receives Help From: Family  ADL Assistance: Independent  Homemaking Responsibilities: No  Ambulation Assistance: Independent  Transfer Assistance: Independent  Active : No  Patient's  Info:  drives  Occupation: Retired  Type of occupation: Retired [employer] work  IADL Comments: Sometimes holds onto walls or uses the 9IK with seat due to severe back problems  Additional Comments: Patient reports her legs go out often on her, reports decline since Thanksgiving. Reports difficulty getting up and walking, now taking sponge baths.  Reports 2 falls in last 6 months       Objective   Vision: Impaired  Vision Exceptions: Wears glasses for reading  Hearing: Within functional limits    Orientation  Overall Orientation Status: Within Normal Limits     Balance  Sitting Balance: Supervision  Standing Balance: Stand by assistance  Standing Balance  Time: @ 2 minutes  Activity: Ambulation x 25 feet in room with 4WW and SBA  Comment: Patient on room air, sats 92%  Toilet Transfers  Toilet - Technique: Ambulating  Equipment Used: Standard bedside commode  Toilet Transfer: Supervision  Toilet Transfers Comments: BSC over commode  ADL  Feeding: Independent  Grooming: Setup  LE Dressing: Minimal assistance  Tone RUE  RUE Tone: Normotonic  Tone LUE  LUE Tone: Normotonic  Coordination  Movements Are Fluid And Coordinated: Yes        Transfers  Sit to stand: Stand by assistance  Stand to sit: Stand by assistance  Vision - Basic Assessment  Prior Vision: Wears glasses only for reading  Patient Visual Report: No visual complaint reported.   Cognition  Overall Cognitive Status: WFL  Perception  Overall Perceptual Status: WFL     Sensation  Overall Sensation Status: Impaired  Light Touch: Partial deficits in the LLE        LUE AROM (degrees)  LUE AROM : WFL  Left Hand AROM (degrees)  Left Hand AROM: WFL  RUE AROM (degrees)  RUE AROM : WFL  Right Hand AROM (degrees)  Right Hand AROM: WFL  LUE Strength  Gross LUE Strength: WFL  RUE Strength  Gross RUE Strength: WFL                   Plan   Plan  Times per week: no further OT indicated    G-Code     OutComes Score                                                  AM-PAC Score        AM-[facility] Inpatient Daily Activity Raw Score: 20 (01/21/22 1123)  AM-PAC Inpatient ADL T-Scale Score : 42.03 (01/21/22 1123)  ADL Inpatient CMS 0-100% Score: 38.32 (01/21/22 1123)  ADL Inpatient CMS G-Code Modifier : CJ (01/21/22 1123)    Goals  Short term goals  Time Frame for Short term goals: No further OT indicated  Patient Goals   Patient goals : Go home with        Therapy Time   Individual Concurrent Group Co-treatment   Time In 8236         Time Out 1112         Minutes 40               Timed Code Treatment Minutes:  25      Total Treatment Minutes:  [address], OTR/L [address]

## 2022-01-21 NOTE — PROGRESS NOTES
4 Eyes Skin Assessment     NAME:  Jarocho Dubon  YOB: 1950  MEDICAL RECORD NUMBER:  [id number]    The patient is being assess for  Admission    I agree that 2 RN's have performed a thorough Head to Toe Skin Assessment on the patient. ALL assessment sites listed below have been assessed. Areas assessed by both nurses:    Head, Face, Ears, Shoulders, Back, Chest, Arms, Elbows, Hands, Sacrum. Buttock, Coccyx, Ischium, Legs. Feet and Heels and Other n/a        Does the Patient have a Wound?  No noted wound(s)        Shaq Prevention initiated:  Yes   Wound Care Orders initiated:  No    Pressure Injury (Stage 3,4, Unstageable, DTI, NWPT, and Complex wounds) if present place consult order under [de-identified] No    New and Established Ostomies if present place consult order under : No      Nurse 1 eSignature: Electronically signed by CECIL STARKEY on 1/21/22 at 1:00 AM EST    **SHARE this note so that the co-signing nurse is able to place an eSignature**    Nurse 2 eSignature: Electronically signed by Grace Chun RN on 1/21/22 at 1:28 AM EST

## 2022-01-21 NOTE — CARE COORDINATION
CM noted PT has no recommendations. OT recommended home care and raised toilet seat with arms and transfer tub bench. CM checked with DME liaison Alcon Emanuel and those are not covered by pt's insurance. CM met with patient and discussed OT recommendations and she does not feel she needs home care. She verbalized understanding of buying above items on line or at [employer], [employer], etc.    Pt states  will pick her up. Patient discharged 1/21/2022 to home with .    All discharge needs met per case management     Hossein Aguilera RN, BSN  [phone]

## 2022-01-21 NOTE — PROGRESS NOTES
Pt received order for d/c at this time. Pt mendes cath removed and PIV at this time/ Pt educated on d/c meds and f/u with pcp. Pt contacted family to . Pt awaiting ride at this time. Will cont to monitor.

## 2022-01-21 NOTE — PLAN OF CARE
Problem: Falls - Risk of:  Goal: Will remain free from falls  Description: Will remain free from falls  Outcome: Ongoing     Problem:  Body Temperature -  Risk of, Imbalanced  Goal: Will regain or maintain usual level of consciousness  Outcome: Ongoing     Problem: Isolation Precautions - Risk of Spread of Infection  Goal: Prevent transmission of infection  Outcome: Ongoing

## 2022-01-21 NOTE — PROGRESS NOTES
Shift assessment completed. Routine vitals stable. Scheduled medications given. Patient is awake, alert and oriented. Respirations are easy and unlabored. No c/o pain. Call light within reach.

## 2022-01-21 NOTE — PROGRESS NOTES
Nephrology progress Note  [phone]  [phone]   [facility]. flatev       Patient:  Joie Simmonds   : 1950    Brief HPI    The patient is a 80 y. o.female with significant past medical history of Hypertension, Fibromyalgia, Gout, Hyperlipidemia, Hiatal hernia, MARK, h/o Hysterectomy, h/o lumbar fusion,  came to ER with c/o generalized weakness, myalgias, and back pain. Symptoms have been ongoing for the past 2 months or so. Labs showed MICHAEL and is being admitted for further evaluation.  Being tested for COVID-19  CT head with no acute changes  We are consulted for MICHAEL  Baseline creatinine appears to be less than 1  Creatinine on admission was 1.6  Last creatinine prior to admission was 0.7 in 2021  Denies urinary problems   NSAID usage except very occasionally but naproxen listed in home meds  Of note, lasix, tamsulosin listed in home meds    Subjective/Interval history    Pt seen and examined  Creatinine better  BPs controlled  Has been afebrile  On room air    Review of Systems    SHx:  No visitors at the bed-side      Meds:  Scheduled Meds:   allopurinol  100 mg Oral Daily    amLODIPine  10 mg Oral Daily    atorvastatin  10 mg Oral Daily    levothyroxine  150 mcg Oral Daily    metoprolol  100 mg Oral Daily    oxyCODONE-acetaminophen  1 tablet Oral BID    sodium chloride flush  5-40 mL IntraVENous 2 times per day    heparin (porcine)  5,000 Units SubCUTAneous 3 times per day     Continuous Infusions:   sodium chloride 100 mL/hr at 22 0943    sodium chloride      sodium chloride 75 mL/hr at 22 2238     PRN Meds:.sodium chloride flush, sodium chloride, ondansetron **OR** ondansetron, polyethylene glycol, acetaminophen **OR** acetaminophen, perflutren lipid microspheres, morphine      Vitals:  /70   Pulse 75   Temp 98.1 °F (36.7 °C) (Oral)   Resp 16   Ht 5' 4\" (1.626 m)   Wt (!) 300 lb 9.6 oz (136.4 kg)   SpO2 95%   BMI 51.60 kg/m²       Physical Exam  General : AAOx3, not in pain or respiratory distress, resting in bed  HEENT : normocephalic, atraumatic, mucosa moist, no palor or icterus  CVS: S1 S2 normal, regular rhythm, no murmurs or rubs. Lungs: Clear, no wheezing or crackles. Abd: Soft, bowel sounds normal, non-tender.   Ext: No edema, no cyanosis  : bladder non-distended, no tenderness over the bladder, mendes with clear urine      Labs:  CBC with Differential:    Lab Results   Component Value Date    WBC 14.6 01/20/2022    RBC 4.79 01/20/2022    HGB 11.6 01/20/2022    HCT 36.2 01/20/2022     01/20/2022    MCV 75.7 01/20/2022    MCH 24.3 01/20/2022    MCHC 32.2 01/20/2022    RDW 18.6 01/20/2022    LYMPHOPCT 27.4 01/20/2022    MONOPCT 8.4 01/20/2022    BASOPCT 0.4 01/20/2022    MONOSABS 1.2 01/20/2022    LYMPHSABS 4.0 01/20/2022    EOSABS 0.1 01/20/2022    BASOSABS 0.1 01/20/2022     CMP:    Lab Results   Component Value Date     01/20/2022    K 4.0 01/20/2022    CL 98 01/20/2022    CO2 23 01/20/2022    BUN 52 01/20/2022    CREATININE 1.6 01/20/2022    GFRAA 38 01/20/2022    AGRATIO 1.1 01/20/2022    LABGLOM 32 01/20/2022    GLUCOSE 147 01/20/2022    PROT 7.6 01/20/2022    LABALBU 3.9 01/20/2022    CALCIUM 9.9 01/20/2022    BILITOT <0.2 01/20/2022    ALKPHOS 81 01/20/2022    AST 26 01/20/2022    ALT 26 01/20/2022     BMP:    Lab Results   Component Value Date     01/20/2022    K 4.0 01/20/2022    CL 98 01/20/2022    CO2 23 01/20/2022    BUN 52 01/20/2022    LABALBU 3.9 01/20/2022    CREATININE 1.6 01/20/2022    CALCIUM 9.9 01/20/2022    GFRAA 38 01/20/2022    LABGLOM 32 01/20/2022    GLUCOSE 147 01/20/2022     Albumin:    Lab Results   Component Value Date    LABALBU 3.9 01/20/2022     Calcium:    Lab Results   Component Value Date    CALCIUM 9.9 01/20/2022     Ionized Calcium:  No results found for: IONCA  Magnesium:    Lab Results   Component Value Date    MG 1.40 06/03/2021     Phosphorus:    Lab Results   Component Value Date    PHOS 5.4 05/14/2019     Last 3 Troponin:    Lab Results   Component Value Date    TROPONINI 0.02 01/20/2022    TROPONINI <0.01 06/03/2021    TROPONINI <0.01 07/20/2020     U/A:    Lab Results   Component Value Date    COLORU YELLOW 01/20/2022    PROTEINU Negative 01/20/2022    PHUR 5.0 01/20/2022    WBCUA 8 06/03/2021    RBCUA 2 06/03/2021    BACTERIA 3+ 06/03/2021    CLARITYU Clear 01/20/2022    SPECGRAV 1.022 01/20/2022    LEUKOCYTESUR Negative 01/20/2022    UROBILINOGEN 0.2 01/20/2022    BILIRUBINUR SMALL 01/20/2022    BLOODU Negative 01/20/2022    GLUCOSEU Negative 01/20/2022     Assessment/Plan:    1. MICHAEL improving  Suspect pre-renal   UA consistent with pre-renal state, no protein or blood. Pending renal US  Avoid nephrotoxins including IV contrast, NSAIDs, ACEI/ARBs. Continue to hold furosemide and lisinopril  reduce IVF  Daily BMP  2. Hypertension controlled  Continue to hold lisinopril. 3. Shortness of breath  TTE with LVEF 65-70%, trivial TR  CXR with no acute changes      Colleen Hoyt MD, MD.  1/21/2022  Office Phone : [phone]    Thank you for allowing us to participate in the care of this pt. I willcontinue to follow along. Please call with questions or concerns.

## 2022-01-21 NOTE — PROGRESS NOTES
Physical Therapy    Facility/Department: [address] ONCOLOGY  Initial Assessment    NAME: Desiree Vergara  : 1950  MRN: [id number]    Date of Service: 2022    Discharge Recommendations:  Desiree Vergara scored a 22/24 on the AM-PAC short mobility form. At this time, no further PT is recommended upon discharge due to patient being at or very near functional baseline. Will follow during acute stay. Recommend patient returns home w/ home OT for ADL evaluation. Home with assist PRN   PT Equipment Recommendations  Equipment Needed: Yes  Mobility Devices: ADL AssistiveDevices  Other: Patient may benefit from shower chair, elevated toilet height vs. commode to raise toilet height, and possibly grab bars in both shower and by toilet. Assessment   Body structures, Functions, Activity limitations: Decreased balance;Decreased strength;Decreased endurance  Assessment: Patient was MOD I at home w/ RW. She verbalizes increasing difficulty w/ ADLs including getting on/off toilet and into/out of tub. Will follow during acute stay on a limited basis since patient is near functional baseline. Patient would benefit from home health OT evaluation for bathroom setup and ADL assessment. Treatment Diagnosis: mild mobility and endurance deficits secondary to illness  Prognosis: Excellent  Decision Making: Low Complexity  History: As noted. Exam: ROM, MMT, functional mobility assessment  Clinical Presentation: Stable. PT Education: Goals;PT Role;Plan of Care;Transfer Training;General Safety;Gait Training;Functional Mobility Training  Patient Education: Patient verbalized understanding. Barriers to Learning: None evident. REQUIRES PT FOLLOW UP: Yes  Activity Tolerance  Activity Tolerance: Patient Tolerated treatment well       Patient Diagnosis(es): The primary encounter diagnosis was Nonintractable headache, unspecified chronicity pattern, unspecified headache type.  Diagnoses of Myalgia, Acute kidney injury ([facility] [address] and Elevated troponin were also pertinent to this visit. has a past medical history of Arthritis, Back pain, Colon polyps, Fibromyalgia, Gout, Hiatal hernia, Hyperlipidemia, Hypertension, Peripheral neuropathy, Spondylolisthesis, lumbar region, and Thyroid disease. has a past surgical history that includes Hysterectomy; Thyroid surgery; Carpal tunnel release; Endoscopy, colon, diagnostic; lumbar fusion (N/A, 5/13/2019); lumbar fusion (N/A, 5/13/2019); and Colonoscopy (9/26/2019). Restrictions  Restrictions/Precautions  Restrictions/Precautions: Fall Risk,Isolation (COVID r/o)  Required Braces or Orthoses?: No  Position Activity Restriction  Other position/activity restrictions: The patient is a 80 y. o.female with significant past medical history of Hypertension, Fibromyalgia, Gout, Hyperlipidemia, Hiatal hernia, MARK, h/o Hysterectomy, h/o lumbar fusion,  came to ER with c/o generalized weakness, myalgias, and back pain. Symptoms have been ongoing for the past 2 months or so. Labs showed MICHAEL and is being admitted for further evaluation. Being tested for COVID-19     Vision/Hearing  Vision: Impaired  Vision Exceptions: Wears glasses for reading  Hearing: Within functional limits       Subjective  General  Chart Reviewed: Yes  Patient assessed for rehabilitation services?: Yes  Response To Previous Treatment: Not applicable  Family / Caregiver Present: No  Diagnosis: myalgia  Follows Commands: Within Functional Limits  General Comment  Comments: Patient supine in bed. Subjective  Subjective: Patient reports headache is better as is muscle pain, feels almost back to normal.  Pain Screening  Patient Currently in Pain: Yes  Pain Assessment  Pain Assessment: 0-10  Pain Level: 5  Patient's Stated Pain Goal: No pain  Pain Type: Chronic pain  Pain Location: Back; Foot  Pain Descriptors: Aching  Pain Frequency: Continuous  Pain Onset: On-going  Clinical Progression: Not changed  Functional Pain Assessment: Activities are not prevented  Non-Pharmaceutical Pain Intervention(s): Rest;Repositioned  Vital Signs  Patient Currently in Pain: Yes       Orientation  Orientation  Overall Orientation Status: Within Normal Limits     Social/Functional History  Social/Functional History  Lives With: Spouse  Type of Home: House  Home Layout: Two level (bi-level)  Home Access: Level entry  Entrance Stairs - Number of Steps: bi-level house, 6 steps once inside house  Bathroom Shower/Tub: Tub/Shower unit  Bathroom Toilet: Standard  Bathroom Accessibility: Walker accessible  Home Equipment: 4 wheeled [facility] Help From: Family  ADL Assistance: Independent  Homemaking Responsibilities: No  Ambulation Assistance: Independent  Transfer Assistance: Independent  Active : No  Patient's  Info:  drives  Occupation: Retired  Type of occupation: Retired factory work  IADL Comments: Sometimes holds onto walls or uses the 1VP with seat due to severe back problems  Additional Comments: Patient reports her legs go out often on her, reports decline since Thanksgiving. Reports difficulty getting up and walking, now taking sponge baths. Reports 2 falls in last 6 months       Objective  AROM RLE (degrees)  RLE AROM: WFL  AROM LLE (degrees)  LLE AROM : WFL  Strength RLE  Strength RLE: WFL  Strength LLE  Strength LLE: WFL     Sensation  Overall Sensation Status: Impaired  Light Touch: Partial deficits in the LLE  Bed mobility  Supine to Sit: Supervision  Sit to Supine: Supervision  Scooting: Supervision  Comment: Supervision in case patient required assistance. Patient performed with HOB slightly elevated. Transfers  Sit to Stand: Supervision  Stand to sit: Supervision  Bed to Chair: Contact guard assistance  Stand Pivot Transfers: Contact guard assistance  Comment: Gait belt donned. CGA maintained as patient reports history of falls and knees giving out without warning.   Ambulation  Ambulation?: Yes  More Ambulation?: No  Ambulation 1  Surface: level tile  Device: Rollator  Assistance: Modified Independent  Gait Deviations: Slow Anabelle;Decreased step length;Decreased step height;Shuffles  Distance: 25'  Comments: Patient shuffles d/t LLE mild foot drop, only able to DF to approximately neutral.  Stairs/Curb  Stairs?: No     Balance  Posture: Good  Sitting - Static: Good  Sitting - Dynamic: Good  Standing - Static: Good  Standing - Dynamic: [address]  Times per week: 1-2  Current Treatment Recommendations: Endurance Training,Functional Mobility Training,Gait Training  Safety Devices  Type of devices: Call light within reach,All fall risk precautions in place,Chair alarm in place,Gait belt,Patient at risk for falls,Left in chair,Nurse notified  Restraints  Initially in place: No      AM-PAC Score  AM-PAC Inpatient Mobility Raw Score : 22 (01/21/22 1115)  AM-PAC Inpatient T-Scale Score : 53.28 (01/21/22 1115)  Mobility Inpatient CMS 0-100% Score: 20.91 (01/21/22 1115)  Mobility Inpatient CMS G-Code Modifier : CJ (01/21/22 1115)          Goals  Short term goals  Time Frame for Short term goals: Discharge. Short term goal 1: Patient will perform all transfers MOD I w/ RW. Short term goal 2: Patient will ambulate 48' MOD I w/ RW. Patient Goals   Patient goals : Return home.        Therapy Time   Individual Concurrent Group Co-treatment   Time In 4624         Time Out 1113         Minutes 38         Timed Code Treatment Minutes: 1430 [place], [place], DPT, ATC-R [id number]

## 2022-01-24 NOTE — DISCHARGE SUMMARY
Hospital Medicine Discharge Summary    Patient ID: Charly Villareal      Patient's PCP: Nicko Dooley MD    Admit Date: 1/20/2022     Discharge Date: 1/21/2022      Admitting Physician: Efrain French MD     Discharge Physician: Jey Call MD     Discharge Diagnoses: Active Hospital Problems    Diagnosis     Morbid obesity with BMI of 45.0-49.9, adult ([facility] [E66.01, Z68.42]     MICHAEL (acute kidney injury) ([facility] [N17.9]     Leukocytosis [D72.829]     Anemia [D64.9]     MARK (obstructive sleep apnea) [G47.33]        The patient was seen and examined on day of discharge and this discharge summary is in conjunction with any daily progress note from day of discharge. Hospital Course:     80 y.o. female with morbid obesity, fibromyalgia, HTN, hypothyroidism, MARK who came to ER with generalized aches and back pain. Found to have MICHAEL in ED. No CP, SOB, abdominal pain, fevers, nausea, vomiting, melena, hematochezia, dysphagia or syncope. No focal weakness. No dysuria or hematuria. Larsen to be placed in ED. Otherwise complete ROS is negative unless listed above. MICHAEL-improved  Hypertension-improved  Shortness of breath-Covid negative      Physical Exam Performed:     BP (!) 148/88   Pulse 75   Temp 98.3 °F (36.8 °C) (Oral)   Resp 18   Ht 5' 4\" (1.626 m)   Wt (!) 300 lb 9.6 oz (136.4 kg)   SpO2 97%   BMI 51.60 kg/m²       General appearance:  No apparent distress, appears stated age and cooperative. HEENT:  Normal cephalic, atraumatic without obvious deformity. Pupils equal, round, and reactive to light. Extra ocular muscles intact. Conjunctivae/corneas clear. Neck: Supple, with full range of motion. No jugular venous distention. Trachea midline. Respiratory:  Normal respiratory effort. Clear to auscultation, bilaterally without Rales/Wheezes/Rhonchi. Cardiovascular:  Regular rate and rhythm with normal S1/S2 without murmurs, rubs or gallops.   Abdomen: Soft, non-tender, non-distended with normal bowel sounds. Musculoskeletal:  No clubbing, cyanosis or edema bilaterally. Full range of motion without deformity. Skin: Skin color, texture, turgor normal.  No rashes or lesions. Neurologic:  Neurovascularly intact without any focal sensory/motor deficits. Cranial nerves: II-XII intact, grossly non-focal.  Psychiatric:  Alert and oriented, thought content appropriate, normal insight  Capillary Refill: Brisk,< 3 seconds   Peripheral Pulses: +2 palpable, equal bilaterally       Labs: For convenience and continuity at follow-up the following most recent labs are provided:      CBC:    Lab Results   Component Value Date    WBC 11.2 01/21/2022    HGB 10.8 01/21/2022    HCT 34.1 01/21/2022     01/21/2022       Renal:    Lab Results   Component Value Date     01/21/2022    K 4.5 01/21/2022     01/21/2022    CO2 28 01/21/2022    BUN 37 01/21/2022    CREATININE 1.0 01/21/2022    CALCIUM 9.2 01/21/2022    PHOS 5.4 05/14/2019         Significant Diagnostic Studies    Radiology:   US RENAL COMPLETE   Final Result   No hydronephrosis or other acute abnormality. XR CHEST (2 VW)   Final Result   Bibasilar atelectasis. CT Head WO Contrast   Final Result   No acute intracranial abnormality. Consults:     IP CONSULT TO NEPHROLOGY  IP CONSULT TO HOSPITALIST    Disposition: Home    Condition at Discharge: Stable    Discharge Instructions/Follow-up: PCP    Code Status:  Prior     Activity: activity as tolerated    Diet: cardiac diet      Discharge Medications:     Discharge Medication List as of 1/21/2022  6:52 PM           Details   DULoxetine (CYMBALTA) 30 MG extended release capsule Take 30 mg by mouth dailyHistorical Med      allopurinol (ZYLOPRIM) 100 MG tablet Take 100 mg by mouth dailyHistorical Med      oxyCODONE-acetaminophen (PERCOCET) 5-325 MG per tablet Take 1 tablet by mouth 2 times daily. Historical Med      atorvastatin (LIPITOR) 10 MG tablet Take 10 mg by mouth dailyHistorical Med      metoprolol (LOPRESSOR) 100 MG tablet Take 100 mg by mouth dailyHistorical Med      levothyroxine (SYNTHROID) 150 MCG tablet Take 150 mcg by mouth Daily. furosemide (LASIX) 80 MG tablet Take 40 mg by mouth daily Historical Med      amLODIPine (NORVASC) 10 MG tablet Take 10 mg by mouth daily. Time Spent on discharge is more than 30 minutes in the examination, evaluation, counseling and review of medications and discharge plan.       Signed:    Electronically signed by Lyubov Lucero MD on 1/24/2022 at 6:24 PM

## 2022-02-08 ENCOUNTER — OFFICE VISIT (OUTPATIENT)
Dept: ENT CLINIC | Age: 72
End: 2022-02-08
Payer: MEDICARE

## 2022-02-08 VITALS — HEART RATE: 103 BPM | TEMPERATURE: 97.5 F | SYSTOLIC BLOOD PRESSURE: 140 MMHG | DIASTOLIC BLOOD PRESSURE: 85 MMHG

## 2022-02-08 DIAGNOSIS — R51.9 FACIAL PAIN: ICD-10-CM

## 2022-02-08 DIAGNOSIS — M26.629 ARTHRALGIA OF TEMPOROMANDIBULAR JOINT, UNSPECIFIED LATERALITY: Primary | ICD-10-CM

## 2022-02-08 PROCEDURE — 99203 OFFICE O/P NEW LOW 30 MIN: CPT | Performed by: STUDENT IN AN ORGANIZED HEALTH CARE EDUCATION/TRAINING PROGRAM

## 2022-02-08 NOTE — PROGRESS NOTES
[address] [facility] SURGERY  NEW PATIENT HISTORY AND PHYSICAL NOTE      Patient Name: Divina Alatorre Record Number:  [id number]  Primary Care Physician:  Abe Ayala MD    ChiefComplaint     Chief Complaint   Patient presents with    Other     patient states she had previous had a cap placed on her tooth on the right side, since then she has had constant pain in her right jaw that travels up the right side of her face, went to multiple dentists who told her nothing was wrong with her tooth       History of Present Illness     Carlie Marshall is an 80 y.o. female presenting with prior right cheek pain. When she called to make the appointment approximately 1 month ago she was experiencing pain that started her right cheek and would radiate to her right temple. This started after having a cat placed on a tooth by her dentist.  She was seen by 2 dentist after this procedure and was told there was nothing wrong with her dentition. Recently she got her dentures refit. Her symptoms have since resolved. Since onset she has had steroid injections in knees and has been on oral steroids. No gum chewing. Wears dentures. Thinks she might clench jaw at night. No environmental meds. No hearing loss. No otorrhea. No tinnitus. No hx of otologic or sinonasal surgery.      Past Medical History     Past Medical History:   Diagnosis Date    Arthritis     Back pain     Colon polyps     Fibromyalgia     Gout     Hiatal hernia     Hyperlipidemia     Hypertension     Peripheral neuropathy     Spondylolisthesis, lumbar region     Thyroid disease        Past Surgical History     Past Surgical History:   Procedure Laterality Date    CARPAL TUNNEL RELEASE      and nerve surgery    COLONOSCOPY  9/26/2019    COLONOSCOPY POLYPECTOMY SNARE/COLD BIOPSY performed by Edu Orozco MD at [address], COLON, DIAGNOSTIC      HYSTERECTOMY      LUMBAR FUSION N/A 5/13/2019    L2-3, L3-4 LATERAL LUMBAR INTERBODY FUSION performed by Max Frank MD at [facility] N/A 5/13/2019    L4-5 TRANSFORAMINAL LUMBAR INTERBODY FUSION WITH PEDICLE SCREW performed by Max Frank MD at [address] [place] [address] THYROID SURGERY         Family History     Family History   Problem Relation Age of Onset    Diabetes Mother        Social History     Social History     Tobacco Use    Smoking status: Never Smoker    Smokeless tobacco: Never Used   Vaping Use    Vaping Use: Never used   Substance Use Topics    Alcohol use: No    Drug use: No        Allergies     Allergies   Allergen Reactions    Lisinopril Other (See Comments)     Cough         Medications     Current Outpatient Medications   Medication Sig Dispense Refill    DULoxetine (CYMBALTA) 30 MG extended release capsule Take 30 mg by mouth daily      allopurinol (ZYLOPRIM) 100 MG tablet Take 100 mg by mouth daily      oxyCODONE-acetaminophen (PERCOCET) 5-325 MG per tablet Take 1 tablet by mouth 2 times daily.  atorvastatin (LIPITOR) 10 MG tablet Take 10 mg by mouth daily      metoprolol (LOPRESSOR) 100 MG tablet Take 100 mg by mouth daily      levothyroxine (SYNTHROID) 150 MCG tablet Take 150 mcg by mouth Daily.  furosemide (LASIX) 80 MG tablet Take 40 mg by mouth daily       amLODIPine (NORVASC) 10 MG tablet Take 10 mg by mouth daily. No current facility-administered medications for this visit.        Review of Systems     REVIEW OF SYSTEMS  The following systems were reviewed and revealed the following in addition to any already discussed in the HPI:    CONSTITUTIONAL: no weight loss, no fever, no night sweats, no chills  EYES: no vision changes, no blurry vision  EARS: no hearing loss, no otalgia  NOSE: no epistaxis, no rhinorrhea  THROAT: No voice changes, no sore throat, no dysphagia      PhysicalExam     Vitals:    02/08/22 1150   BP: (!) 140/85   Site: Right Upper Arm   Position: Sitting   Cuff Size: Medium Adult   Pulse: 103   Temp: 97.5 °F (36.4 °C)   TempSrc: Temporal       PHYSICAL EXAM  BP (!) 140/85 (Site: Right Upper Arm, Position: Sitting, Cuff Size: Medium Adult)   Pulse 103   Temp 97.5 °F (36.4 °C) (Temporal)     GENERAL: No acute distress, alert and oriented, no hoarseness  EYES: EOMI, Anti-icteric  NOSE: On anterior rhinoscopy there is no epistaxis, nasal mucosa moist and normal appearing, no purulent drainage. EARS: Normal external appearance; on portable otomicroscopy:     -Ad: External auditory canal without stenosis, tympanic membrane clear, no middle ear effusions or retractions.      -As: External auditory canal without stenosis, tympanic membrane clear, no middle ear effusions or retractions. Pneumatic otoscopy: Bilateral tympanic membranes mobile pneumatic otoscopy  FACE: HB 1/6 bilaterally, symmetric appearing, sensation equal bilaterally  ORAL CAVITY: No masses or lesions visualized or palpated, uvula is midline, moist mucous membranes, symmetric 1+ tonsils, missing multiple teeth  NECK: No tenderness palpation of bilateral TMJs upon opening closing jaw. Normal range of motion, no thyromegaly, trachea is midline, no palpable lymphadenopathy or neck masses, no crepitus  NEURO: Cranial Nerves 2, 3, 4, 5, 6, 7, 11, 12 grossly intact bilaterally     I have performed a head and neck physical exam personally or was physically present during the key or critical portions of the service. Assessment and Plan     1. Arthralgia of temporomandibular joint, unspecified laterality  2. Facial pain    Plan:  -Right facial and temporal pain likely secondary to either recent dental work or underlying TMJ arthralgia  -Symptoms have improved after round of systemic antibiotics. Would recommend switching to soft diet. She is currently in the process of getting new dentures which should help as well for TMJ arthralgia. In the future would recommend NSAIDs as needed.     -If symptoms represent she will call the office and schedule for follow-up appointment. Follow Up     Return if symptoms worsen or fail to improve. Marnie Salomon 47  Department of Otolaryngology/Head & Neck Surgery  2/8/22    Medical Decision Making: The following items were considered in medical decision making:  Independent review of images  Review / order clinical lab tests  Review / order radiology tests  Decision to obtain old records    This note was generated completely or in part utilizing Dragon dictation speech recognition software. Occasionally, words are mistranscribed and despite editing, the text may contain inaccuracies due to incorrect word recognition. If further clarification is needed please contact the office at [phone].

## 2024-06-14 ENCOUNTER — HOSPITAL ENCOUNTER (OUTPATIENT)
Dept: WOMENS IMAGING | Age: 74
Discharge: HOME | End: 2024-06-14
Payer: COMMERCIAL

## 2024-06-14 VITALS — WEIGHT: 276 LBS | BODY MASS INDEX: 47.12 KG/M2 | HEIGHT: 64 IN

## 2024-06-14 DIAGNOSIS — Z12.31 VISIT FOR SCREENING MAMMOGRAM: ICD-10-CM

## 2024-06-14 PROCEDURE — 77063 BREAST TOMOSYNTHESIS BI: CPT

## 2024-08-01 ENCOUNTER — HOSPITAL ENCOUNTER (OUTPATIENT)
Dept: PHYSICAL THERAPY | Age: 74
Setting detail: THERAPIES SERIES
Discharge: HOME | End: 2024-08-01
Payer: COMMERCIAL

## 2024-08-01 DIAGNOSIS — R53.1 DECREASED STRENGTH, ENDURANCE, AND MOBILITY: ICD-10-CM

## 2024-08-01 DIAGNOSIS — Z74.09 DECREASED STRENGTH, ENDURANCE, AND MOBILITY: ICD-10-CM

## 2024-08-01 DIAGNOSIS — R53.1 GENERALIZED WEAKNESS: Primary | ICD-10-CM

## 2024-08-01 DIAGNOSIS — Z74.09 IMPAIRED FUNCTIONAL MOBILITY, BALANCE, AND ENDURANCE: ICD-10-CM

## 2024-08-01 DIAGNOSIS — R26.9 GAIT ABNORMALITY: ICD-10-CM

## 2024-08-01 DIAGNOSIS — Z91.81 AT RISK FOR FALLS: ICD-10-CM

## 2024-08-01 DIAGNOSIS — R68.89 DECREASED STRENGTH, ENDURANCE, AND MOBILITY: ICD-10-CM

## 2024-08-01 PROCEDURE — 97530 THERAPEUTIC ACTIVITIES: CPT

## 2024-08-01 PROCEDURE — 97162 PT EVAL MOD COMPLEX 30 MIN: CPT

## 2024-08-01 NOTE — PLAN OF CARE
[facility] [address], [place] [address] office: [phone] fax: [phone]     Physical Therapy Initial Evaluation Certification      Dear Ash Oden MD,    We had the pleasure of evaluating the following patient for physical therapy services at [facility].  A summary of our findings can be found in the initial assessment below.  This includes our plan of care.  If you have any questions or concerns regarding these findings, please do not hesitate to contact me at the office phone number listed above.  Thank you for the referral.     Physician Signature:_______________________________Date:__________________  By signing above (or electronic signature), therapist’s plan is approved by physician       Physical Therapy: TREATMENT/PROGRESS NOTE   Patient: Denise Whittaker (74 y.o. female)   Examination Date: 2024   :  1950 MRN: [id number]   Visit #: 1   Insurance Allowable Auth Needed   [place] []Yes    []No    Insurance: Payor: [employer] / Plan: [place] - CHOICE PLU / Product Type: *No Product type* /   Insurance ID: [id number] - (Commercial)  Secondary Insurance (if applicable):    Treatment Diagnosis:     ICD-10-CM    1. Generalized weakness  R53.1       2. Gait abnormality  R26.9       3. At risk for falls  Z91.81       4. Decreased strength, endurance, and mobility  R53.1     Z74.09     R68.89       5. Impaired functional mobility, balance, and endurance  Z74.09          Medical Diagnosis:  Weakness [R53.1]   Referring Physician: Ash Oden MD  PCP: Ash Oden MD     Plan of care signed (Y/N):     Date of Patient follow up with Physician:      Plan of Care Report: EVAL today  POC update due: (10 visits /OR AUTH LIMITS, whichever is less)  2024                                             Medical History:  Comorbidities:  Other: see below   Relevant Medical History:

## 2024-08-05 ENCOUNTER — APPOINTMENT (OUTPATIENT)
Dept: PHYSICAL THERAPY | Age: 74
End: 2024-08-05
Payer: COMMERCIAL

## 2024-08-08 ENCOUNTER — APPOINTMENT (OUTPATIENT)
Dept: PHYSICAL THERAPY | Age: 74
End: 2024-08-08
Payer: COMMERCIAL

## 2024-08-09 ENCOUNTER — APPOINTMENT (OUTPATIENT)
Dept: PHYSICAL THERAPY | Age: 74
End: 2024-08-09
Payer: COMMERCIAL

## 2024-08-13 ENCOUNTER — APPOINTMENT (OUTPATIENT)
Dept: PHYSICAL THERAPY | Age: 74
End: 2024-08-13
Payer: COMMERCIAL

## 2024-08-15 ENCOUNTER — APPOINTMENT (OUTPATIENT)
Dept: PHYSICAL THERAPY | Age: 74
End: 2024-08-15
Payer: COMMERCIAL

## 2024-08-20 ENCOUNTER — APPOINTMENT (OUTPATIENT)
Dept: PHYSICAL THERAPY | Age: 74
End: 2024-08-20
Payer: COMMERCIAL

## 2024-08-22 ENCOUNTER — APPOINTMENT (OUTPATIENT)
Dept: PHYSICAL THERAPY | Age: 74
End: 2024-08-22
Payer: COMMERCIAL

## 2024-12-13 ENCOUNTER — HOSPITAL ENCOUNTER (OUTPATIENT)
Age: 74
Discharge: HOME | End: 2024-12-13
Payer: COMMERCIAL

## 2024-12-13 LAB
ALBUMIN SERPL-MCNC: 3.9 G/DL (ref 3.4–5)
ALP SERPL-CCNC: 102 U/L (ref 40–129)
ALT SERPL-CCNC: 10 U/L (ref 10–40)
ANION GAP SERPL CALCULATED.3IONS-SCNC: 16 MMOL/L (ref 3–16)
ANISOCYTOSIS BLD QL SMEAR: ABNORMAL
AST SERPL-CCNC: 30 U/L (ref 15–37)
BASOPHILS # BLD: 0.1 K/UL (ref 0–0.2)
BASOPHILS NFR BLD: 1 %
BILIRUB DIRECT SERPL-MCNC: <0.1 MG/DL (ref 0–0.3)
BILIRUB INDIRECT SERPL-MCNC: 0.3 MG/DL (ref 0–1)
BILIRUB SERPL-MCNC: 0.4 MG/DL (ref 0–1)
BUN SERPL-MCNC: 23 MG/DL (ref 7–20)
CALCIUM SERPL-MCNC: 10.2 MG/DL (ref 8.3–10.6)
CHLORIDE SERPL-SCNC: 102 MMOL/L (ref 99–110)
CO2 SERPL-SCNC: 27 MMOL/L (ref 21–32)
CREAT SERPL-MCNC: 0.9 MG/DL (ref 0.6–1.2)
DEPRECATED RDW RBC AUTO: 18.1 % (ref 12.4–15.4)
EOSINOPHIL # BLD: 0.1 K/UL (ref 0–0.6)
EOSINOPHIL NFR BLD: 1 %
GFR SERPLBLD CREATININE-BSD FMLA CKD-EPI: 67 ML/MIN/{1.73_M2}
GLUCOSE SERPL-MCNC: 95 MG/DL (ref 70–99)
HCT VFR BLD AUTO: 35.5 % (ref 36–48)
HGB BLD-MCNC: 11.1 G/DL (ref 12–16)
HYPOCHROMIA BLD QL SMEAR: ABNORMAL
LYMPHOCYTES # BLD: 5.5 K/UL (ref 1–5.1)
LYMPHOCYTES NFR BLD: 45 %
MCH RBC QN AUTO: 22.8 PG (ref 26–34)
MCHC RBC AUTO-ENTMCNC: 31.2 G/DL (ref 31–36)
MCV RBC AUTO: 73.1 FL (ref 80–100)
MICROCYTES BLD QL SMEAR: ABNORMAL
MONOCYTES # BLD: 0.2 K/UL (ref 0–1.3)
MONOCYTES NFR BLD: 2 %
NEUTROPHILS # BLD: 6.3 K/UL (ref 1.7–7.7)
NEUTROPHILS NFR BLD: 51 %
PATH INTERP BLD-IMP: YES
PHOSPHATE SERPL-MCNC: 2.8 MG/DL (ref 2.5–4.9)
PLATELET # BLD AUTO: 354 K/UL (ref 135–450)
PMV BLD AUTO: 8.9 FL (ref 5–10.5)
POTASSIUM SERPL-SCNC: 3.4 MMOL/L (ref 3.5–5.1)
PROT SERPL-MCNC: 8 G/DL (ref 6.4–8.2)
RBC # BLD AUTO: 4.85 M/UL (ref 4–5.2)
SLIDE REVIEW: ABNORMAL
SODIUM SERPL-SCNC: 145 MMOL/L (ref 136–145)
WBC # BLD AUTO: 12.3 K/UL (ref 4–11)

## 2024-12-13 PROCEDURE — 80069 RENAL FUNCTION PANEL: CPT

## 2024-12-13 PROCEDURE — 80076 HEPATIC FUNCTION PANEL: CPT

## 2024-12-13 PROCEDURE — 85025 COMPLETE CBC W/AUTO DIFF WBC: CPT

## 2024-12-13 PROCEDURE — 36415 COLL VENOUS BLD VENIPUNCTURE: CPT

## 2024-12-16 LAB — PATH INTERP BLD-IMP: NORMAL

## 2025-06-16 ENCOUNTER — HOSPITAL ENCOUNTER (OUTPATIENT)
Dept: WOMENS IMAGING | Age: 75
Discharge: HOME | End: 2025-06-16
Payer: COMMERCIAL

## 2025-06-16 VITALS — BODY MASS INDEX: 45.24 KG/M2 | WEIGHT: 265 LBS | HEIGHT: 64 IN

## 2025-06-16 DIAGNOSIS — Z12.31 VISIT FOR SCREENING MAMMOGRAM: ICD-10-CM

## 2025-06-16 PROCEDURE — 77063 BREAST TOMOSYNTHESIS BI: CPT

## 2025-06-26 ENCOUNTER — HOSPITAL ENCOUNTER (OUTPATIENT)
Age: 75
Discharge: HOME | End: 2025-06-26
Payer: COMMERCIAL

## 2025-06-26 LAB
25(OH)D3 SERPL-MCNC: 23.2 NG/ML
ALBUMIN SERPL-MCNC: 4 G/DL (ref 3.4–5)
ANION GAP SERPL CALCULATED.3IONS-SCNC: 14 MMOL/L (ref 3–16)
BASOPHILS # BLD: 0.1 K/UL (ref 0–0.2)
BASOPHILS NFR BLD: 1.1 %
BUN SERPL-MCNC: 20 MG/DL (ref 7–20)
CALCIUM SERPL-MCNC: 10 MG/DL (ref 8.3–10.6)
CHLORIDE SERPL-SCNC: 104 MMOL/L (ref 99–110)
CO2 SERPL-SCNC: 26 MMOL/L (ref 21–32)
CREAT SERPL-MCNC: 0.8 MG/DL (ref 0.6–1.2)
DEPRECATED RDW RBC AUTO: 16.3 % (ref 12.4–15.4)
EOSINOPHIL # BLD: 0.1 K/UL (ref 0–0.6)
EOSINOPHIL NFR BLD: 0.7 %
EST. AVERAGE GLUCOSE BLD GHB EST-MCNC: 128.4 MG/DL
GFR SERPLBLD CREATININE-BSD FMLA CKD-EPI: 77 ML/MIN/{1.73_M2}
GLUCOSE SERPL-MCNC: 127 MG/DL (ref 70–99)
HBA1C MFR BLD: 6.1 %
HCT VFR BLD AUTO: 34.6 % (ref 36–48)
HGB BLD-MCNC: 11.1 G/DL (ref 12–16)
LYMPHOCYTES # BLD: 3.4 K/UL (ref 1–5.1)
LYMPHOCYTES NFR BLD: 26.8 %
MCH RBC QN AUTO: 23.9 PG (ref 26–34)
MCHC RBC AUTO-ENTMCNC: 32 G/DL (ref 31–36)
MCV RBC AUTO: 74.8 FL (ref 80–100)
MONOCYTES # BLD: 0.7 K/UL (ref 0–1.3)
MONOCYTES NFR BLD: 5.7 %
NEUTROPHILS # BLD: 8.4 K/UL (ref 1.7–7.7)
NEUTROPHILS NFR BLD: 65.7 %
PHOSPHATE SERPL-MCNC: 3.8 MG/DL (ref 2.5–4.9)
PLATELET # BLD AUTO: 261 K/UL (ref 135–450)
PMV BLD AUTO: 9.1 FL (ref 5–10.5)
POTASSIUM SERPL-SCNC: 3.5 MMOL/L (ref 3.5–5.1)
RBC # BLD AUTO: 4.63 M/UL (ref 4–5.2)
SODIUM SERPL-SCNC: 144 MMOL/L (ref 136–145)
WBC # BLD AUTO: 12.8 K/UL (ref 4–11)

## 2025-06-26 PROCEDURE — 82330 ASSAY OF CALCIUM: CPT

## 2025-06-26 PROCEDURE — 83036 HEMOGLOBIN GLYCOSYLATED A1C: CPT

## 2025-06-26 PROCEDURE — 80069 RENAL FUNCTION PANEL: CPT

## 2025-06-26 PROCEDURE — 36415 COLL VENOUS BLD VENIPUNCTURE: CPT

## 2025-06-26 PROCEDURE — 85025 COMPLETE CBC W/AUTO DIFF WBC: CPT

## 2025-06-26 PROCEDURE — 82306 VITAMIN D 25 HYDROXY: CPT

## 2025-06-27 LAB
CA-I ADJ PH7.4 SERPL-SCNC: 1.31 MMOL/L (ref 1.09–1.3)
CA-I SERPL ISE-SCNC: 1.27 MMOL/L (ref 1.09–1.3)

## 2025-06-30 ENCOUNTER — HOSPITAL ENCOUNTER (OUTPATIENT)
Dept: ULTRASOUND IMAGING | Age: 75
Discharge: HOME | End: 2025-06-30
Payer: COMMERCIAL

## 2025-06-30 DIAGNOSIS — M54.40 LOW BACK PAIN WITH SCIATICA, SCIATICA LATERALITY UNSPECIFIED, UNSPECIFIED BACK PAIN LATERALITY, UNSPECIFIED CHRONICITY: ICD-10-CM

## 2025-06-30 PROCEDURE — 76770 US EXAM ABDO BACK WALL COMP: CPT

## (undated) DEVICE — CATHETER IV 14GA L5.25IN PERIPH ORNG FEP POLYMER 3 BVL

## (undated) DEVICE — LAMINECTOMY PK

## (undated) DEVICE — HANDPIECE SET WITH SUCTION TUBING: Brand: INTERPULSE

## (undated) DEVICE — FORCEPS BX L240CM DIA2.4MM L NDL RAD JAW 4 133340

## (undated) DEVICE — GLOVE SURG SZ 75 L12IN FNGR THK94MIL TRNSLUC YEL LTX

## (undated) DEVICE — UNDERGLOVE SURG SZ 8 BLU LTX FREE SYN POLYISOPRENE POLYMER

## (undated) DEVICE — SURE SET-DOUBLE BASIN-LF: Brand: MEDLINE INDUSTRIES, INC.

## (undated) DEVICE — TURNOVER KIT RM INF CTRL TECH

## (undated) DEVICE — DRAPE MICSCP W132XL406CM LENS DIA68MM W VARI LENS2 FOR LEICA

## (undated) DEVICE — CRADLE ARM INDIV PT CARE KT COMP FOR FOR RADLUC WILSON FRME

## (undated) DEVICE — C-ARMOR C-ARM EQUIPMENT COVERS CLEAR STERILE UNIVERSAL FIT 12 PER CASE: Brand: C-ARMOR

## (undated) DEVICE — ELECTRODE NERVE STIM FOR SPNL CRD MONITORING

## (undated) DEVICE — GOWN AURORA NONREINF LG: Brand: MEDLINE INDUSTRIES, INC.

## (undated) DEVICE — 60 ML SYRINGE,REGULAR TIP: Brand: MONOJECT

## (undated) DEVICE — CODMAN® SURGICAL PATTIES 1/2" X 3" (1.27CM X 7.62CM): Brand: CODMAN®

## (undated) DEVICE — SUTURE MCRYL SZ 4-0 L27IN ABSRB UD L19MM PS-2 1/2 CIR PRIM Y426H

## (undated) DEVICE — TRAP SPEC RETRV CLR PLAS POLYP IN LN SUCT QUIK CTCH

## (undated) DEVICE — SYSTEM SKIN CLSR 22CM DERMBND PRINEO

## (undated) DEVICE — SURGICAL SET UP - SURE SET: Brand: MEDLINE INDUSTRIES, INC.

## (undated) DEVICE — CABLE BPLR L12FT FLYING LD DISPOSABLE

## (undated) DEVICE — MODULE EMG W/ NVM5 HARN 2 NEEDLE/NEUROVISION 2 SLD GEL

## (undated) DEVICE — PRE OP PACK: Brand: MEDLINE INDUSTRIES, INC.

## (undated) DEVICE — SUTURE VCRL SZ 3-0 L18IN ABSRB UD L26MM SH 1/2 CIR J864D

## (undated) DEVICE — COVER,TABLE,HEAVY DUTY,77"X90",STRL: Brand: MEDLINE

## (undated) DEVICE — PROCEDURE KIT ENDOSCP CUST

## (undated) DEVICE — KIT POS W/ FOAM ARM CRADL SHEARGUARD CHST PD CVR FOR SPNL

## (undated) DEVICE — PLATE ES AD W 9FT CRD 2

## (undated) DEVICE — GLOVE SURG SZ 6 L12IN FNGR THK75MIL WHT LTX POLYMER BEAD

## (undated) DEVICE — SET VLV 3 PC AWS DISPOSABLE GRDIAN SCOPEVALET

## (undated) DEVICE — KIT DIL PRB K WIRE DISP FOR EXTRM LUM INTBDY FUS

## (undated) DEVICE — E-Z CLEAN, NON-STICK, PTFE COATED, ELECTROSURGICAL BLADE ELECTRODE, MODIFIED EXTENDED INSULATION, 6.5 INCH (16.5 CM): Brand: MEGADYNE

## (undated) DEVICE — COVER LT HNDL CAM BLU DISP W/ SURG CTRL

## (undated) DEVICE — SOLUTION IV 1000ML 0.9% SOD CHL

## (undated) DEVICE — SOLUTION IV IRRIG WATER 500ML POUR BRL ST 2F7113

## (undated) DEVICE — Device: Brand: DISPOSABLE TROCAR INSERT PEDICLE ACCESS NEEDLE (1 PCS.)

## (undated) DEVICE — SUTURE VCRL SZ 0 L18IN ABSRB UD L36MM CT-1 1/2 CIR J840D

## (undated) DEVICE — BLADE ES L4IN INSUL EDGE

## (undated) DEVICE — KIT SPNL ACC MAXCESS IV

## (undated) DEVICE — Device: Brand: DISPOSABLE ELECTROSURGICAL SNARE

## (undated) DEVICE — CODMAN® SURGICAL PATTIES 1/4" X 1/4" (0.64CM X 0.64CM): Brand: CODMAN®

## (undated) DEVICE — GARMENT,MEDLINE,DVT,INT,CALF,MED, GEN2: Brand: MEDLINE

## (undated) DEVICE — GLOVE SURG SZ 65 L12IN FNGR THK87MIL WHT LTX FREE

## (undated) DEVICE — TOOL 14MH30 LEGEND 14CM 3MM: Brand: MIDAS REX ™

## (undated) DEVICE — PAD,NON-ADHERENT,3X8,STERILE,LF,1/PK: Brand: MEDLINE

## (undated) DEVICE — CHLORAPREP 26ML ORANGE

## (undated) DEVICE — BW-412T DISP COMBO CLEANING BRUSH: Brand: SINGLE USE COMBINATION CLEANING BRUSH

## (undated) DEVICE — TRAY CATHETER 16FR F INCLUDE BARDX IC COMPLT CARE DRNGE BG

## (undated) DEVICE — HIGH FLOW TIP